# Patient Record
Sex: FEMALE | Race: WHITE | NOT HISPANIC OR LATINO | Employment: UNEMPLOYED | URBAN - NONMETROPOLITAN AREA
[De-identification: names, ages, dates, MRNs, and addresses within clinical notes are randomized per-mention and may not be internally consistent; named-entity substitution may affect disease eponyms.]

---

## 2017-01-11 ENCOUNTER — OFFICE VISIT (OUTPATIENT)
Dept: CHIROPRACTIC MEDICINE | Facility: OTHER | Age: 47
End: 2017-01-11
Attending: CHIROPRACTOR
Payer: COMMERCIAL

## 2017-01-11 DIAGNOSIS — M54.50 ACUTE BILATERAL LOW BACK PAIN WITHOUT SCIATICA: ICD-10-CM

## 2017-01-11 DIAGNOSIS — M99.03 SEGMENTAL AND SOMATIC DYSFUNCTION OF LUMBAR REGION: Primary | ICD-10-CM

## 2017-01-11 DIAGNOSIS — M99.02 SEGMENTAL AND SOMATIC DYSFUNCTION OF THORACIC REGION: ICD-10-CM

## 2017-01-11 PROCEDURE — 98940 CHIROPRACT MANJ 1-2 REGIONS: CPT | Mod: AT | Performed by: CHIROPRACTOR

## 2017-01-16 NOTE — PROGRESS NOTES
Subjective Finding:    Chief compalint: Patient presents with:  Back Pain  , Pain Scale: 7/10, Intensity: sharp, Duration: 2 days, Radiating: no.    Date of injury:     Activities that the pain restricts:   Home/household/hobbies/social activities: no.  Work duties: no.  Sleep: yes.  Makes symptoms better: rest.  Makes symptoms worse: thoracic extension and thoracic flexion.  Have you seen anyone else for the symptoms? no  Work related: no.  Automobile related injury: no.    Objective and Assessment:    Posture Analysis:   High shoulder: right.  Head tilt: left.  High iliac crest: left.  Head carriage: neutral.  Thoracic Kyphosis: neutral.  Lumbar Lordosis: neutral.    Lumbar Range of Motion: extension decreased.  Cervical Range of Motion: extension decreased.  Thoracic Range of Motion: extension decreased.  Extremity Range of Motion: .    Palpation:   Trap tightness    Segmental dysfunction pre-treatment and treatment area: T7-6  .  L5    Assessment post-treatment:  Cervical: .  Thoracic: ROM increased and pain and tenderness decreased.  Lumbar: ROM inc.    Comments: .      Complicating Factors: .    Plan / Procedure:    Treatment plan: PRN.  Instructed patient: stretch as instructed at visit.  Short term goals: reduce pain.  Long term goals: restore normal function.  Prognosis: excellent.

## 2017-02-24 ENCOUNTER — TRANSFERRED RECORDS (OUTPATIENT)
Dept: HEALTH INFORMATION MANAGEMENT | Facility: HOSPITAL | Age: 47
End: 2017-02-24

## 2017-02-24 ENCOUNTER — HOSPITAL ENCOUNTER (OUTPATIENT)
Dept: MRI IMAGING | Facility: HOSPITAL | Age: 47
Discharge: HOME | End: 2017-02-24
Attending: FAMILY MEDICINE | Admitting: FAMILY MEDICINE
Payer: COMMERCIAL

## 2017-02-24 DIAGNOSIS — G89.29 CHRONIC MIDLINE LOW BACK PAIN WITHOUT SCIATICA: Primary | ICD-10-CM

## 2017-02-24 DIAGNOSIS — M54.50 CHRONIC MIDLINE LOW BACK PAIN WITHOUT SCIATICA: Primary | ICD-10-CM

## 2017-02-24 PROCEDURE — 72148 MRI LUMBAR SPINE W/O DYE: CPT | Mod: TC

## 2017-03-27 ENCOUNTER — TRANSFERRED RECORDS (OUTPATIENT)
Dept: HEALTH INFORMATION MANAGEMENT | Facility: HOSPITAL | Age: 47
End: 2017-03-27

## 2017-04-20 ENCOUNTER — OFFICE VISIT (OUTPATIENT)
Dept: OBGYN | Facility: OTHER | Age: 47
End: 2017-04-20
Attending: OBSTETRICS & GYNECOLOGY
Payer: COMMERCIAL

## 2017-04-20 VITALS
HEART RATE: 94 BPM | BODY MASS INDEX: 22.82 KG/M2 | DIASTOLIC BLOOD PRESSURE: 76 MMHG | OXYGEN SATURATION: 98 % | HEIGHT: 62 IN | SYSTOLIC BLOOD PRESSURE: 118 MMHG | WEIGHT: 124 LBS

## 2017-04-20 DIAGNOSIS — R10.2 PELVIC PAIN IN FEMALE: ICD-10-CM

## 2017-04-20 DIAGNOSIS — N92.1 MENORRHAGIA WITH IRREGULAR CYCLE: ICD-10-CM

## 2017-04-20 DIAGNOSIS — N80.9 ENDOMETRIOSIS: ICD-10-CM

## 2017-04-20 DIAGNOSIS — N93.9 ABNORMAL UTERINE BLEEDING: Primary | ICD-10-CM

## 2017-04-20 LAB — TSH SERPL DL<=0.05 MIU/L-ACNC: 0.81 MU/L (ref 0.4–4)

## 2017-04-20 PROCEDURE — 36415 COLL VENOUS BLD VENIPUNCTURE: CPT | Performed by: OBSTETRICS & GYNECOLOGY

## 2017-04-20 PROCEDURE — 84443 ASSAY THYROID STIM HORMONE: CPT | Performed by: OBSTETRICS & GYNECOLOGY

## 2017-04-20 PROCEDURE — 84146 ASSAY OF PROLACTIN: CPT | Performed by: OBSTETRICS & GYNECOLOGY

## 2017-04-20 PROCEDURE — 58100 BIOPSY OF UTERUS LINING: CPT | Performed by: OBSTETRICS & GYNECOLOGY

## 2017-04-20 PROCEDURE — 83001 ASSAY OF GONADOTROPIN (FSH): CPT | Performed by: OBSTETRICS & GYNECOLOGY

## 2017-04-20 PROCEDURE — 99213 OFFICE O/P EST LOW 20 MIN: CPT | Performed by: COUNSELOR

## 2017-04-20 PROCEDURE — 99243 OFF/OP CNSLTJ NEW/EST LOW 30: CPT | Mod: 25 | Performed by: OBSTETRICS & GYNECOLOGY

## 2017-04-20 PROCEDURE — 99000 SPECIMEN HANDLING OFFICE-LAB: CPT | Performed by: OBSTETRICS & GYNECOLOGY

## 2017-04-20 PROCEDURE — 88305 TISSUE EXAM BY PATHOLOGIST: CPT | Mod: TC | Performed by: OBSTETRICS & GYNECOLOGY

## 2017-04-20 ASSESSMENT — PAIN SCALES - GENERAL: PAINLEVEL: NO PAIN (0)

## 2017-04-20 NOTE — PROGRESS NOTES
CC:  Consult from Dr. Villegas for pelvic pain/endometriosis/menorrhagia  HPI:  Eugenia Earl is a 47 year old female P2(vag). No LMP recorded..  Menses are Irregular lasting 7 days with 3 of heavy flow.  Her menstrual flow is limiting her clothing choices and interferes with lifestyle/activites.   She has longstanding cyclical pelvic pain and dysmenorrhea.  She has a history of endometriosis with prior laparoscopy with laser and subsequently two rounds of Depo-Lupron with temporary remission.    Periods have become irregular in the last year and heavy.  Pain starts 1 week prior to menses and resolves after heavy bleeding.  Some increased vaginal DC but no vaginitis sx.  Pain worse on left side.  GYN h/o HSV but no outbreaks x 8 years.      Clots: Yes  Intermenstrual bleeding: No  Post-coital bleeding: No  Previous work-up:Yes  Contraception: BTO  Abnormal Pap: No  Dysmenorrhea: Yes  Pelvic pain:Yes      Past GYN history:        Last PAP smear:  Normal 7/16 by report    Patients records are available and reviewed at today's visit.    Past Medical History:   Diagnosis Date     Dysmenorrhea 04/16/2001     Endometriosis of uterus 05/10/2001     Endometriosis, site unspecified 06/18/2001     Follow-up examination, following other surgery 10/01/2008     Follow-up examination, following unspecified surgery 04/19/2001     Nonallopathic lesion of cervical region, not elsewhere classified 12/19/2001       Past Surgical History:   Procedure Laterality Date     LAMINECTOMY LUMBAR POSTERIOR MICROSCOPIC ONE LEVEL N/A 9/30/2015    Procedure: LAMINECTOMY LUMBAR POSTERIOR MICROSCOPIC ONE LEVEL;  Surgeon: Danyel Kaba MD;  Location: [facility]     TONSILLECTOMY       TUBAL LIGATION         Family History   Problem Relation Age of Onset     DIABETES Mother      Coronary Artery Disease Father      Hyperlipidemia Father        Current Outpatient Prescriptions   Medication Sig Dispense Refill     HYDROcodone-acetaminophen (NORCO)  "5-325 MG per tablet Take 1-2 tablets by mouth every 4 hours as needed for moderate to severe pain 15 tablet 0     GABAPENTIN PO Take 300 mg by mouth 2 times daily       acetaminophen (TYLENOL) 500 MG tablet Take 500-1,000 mg by mouth every 6 hours as needed for mild pain         Allergies: Nka [no known allergies]    ROS:  C: NEGATIVE for fever, chills, change in weight  GI: NEGATIVE for nausea, abdominal pain, heartburn, or change in bowel habits  : NEGATIVE for frequency, dysuria, hematuria, vaginal discharge  P: NEGATIVE for changes in mood or affect    EXAM:  Blood pressure 118/76, pulse 94, height 5' 1.75\" (1.568 m), weight 124 lb (56.2 kg), SpO2 98 %, not currently breastfeeding.   BMI= Body mass index is 22.86 kg/(m^2).  General - pleasant female in no acute distress.  Abdomen - soft, nontender, nondistended, no hepatosplenomegaly.  Pelvic - EG: normal adult female, BUS: within normal limits, Vagina: well rugated, no discharge, Cervix: no lesions or CMT, Uterus: firm,normal sized and mildly tender, Adnexae: no masses .  + left adnexal TTP  Rectovaginal - deferred.  Musculoskeletal - no gross deformities or edema  Neurological - normal mental status.  PROCEDURE:  After informed consent was obtained from the patient, a speculum was placed in the vagina to visualize the cervix.  The cervix was then swabbed with a betadine.   Endometrial biopsy pipelle was passed through the cervical os and tissue obtained.  Uterus sounded to 8 cm.   There were no complications. The patient tolerated the procedure well with a minimal amount of cramping noted.  Specimen was sent to pathology.      ASSESSMENT/PLAN:  (N93.9) Abnormal uterine bleeding  (primary encounter diagnosis)  Comment:Menorrhagia, irregular menses.  Perimenopausal.    Plan: Surgical pathology exam (EMBX), Follicle stimulating         hormone, TSH, Prolactin       Transvaginal Non OB     (R10.2) Pelvic pain in female  Comment: Endometriosis, recurrent    Plan: " Discussed expectant,  Medical (repeat Lupron)  surgical options(laparoscopy/hysterectomy). Pt would like to consider definitive treatment in the form of hysterectomy.  I would recommend LAVH/BSO for definitive cure of endometriosis but did discuss ovarian preservation along with menopausal risks.  She could consider LAVH/BS with left oophorectomy if she desires this option. She is aware that endometriosis/pelvic pain could recur until menopause if ovary is retained.   If FSH determines she is close to menopause may elect expectant management.  I will see her back for f/u appt in 2 weeks to discuss results of testing and POC.      I spent 45 minutes on this patient's visit (exclusive of separately billed services/procedures) and over half of this time was spent in face-to-face counseling regarding her diagnosis, treatment options with emphasis on  risks and benefits of each, prognosis and importance of compliance.

## 2017-04-20 NOTE — NURSING NOTE
"Chief Complaint   Patient presents with     Consult     Pelvic Pain/Endometriosis, Dr. Villegas Referring       Initial /76  Pulse 94  Ht 5' 1.75\" (1.568 m)  Wt 124 lb (56.2 kg)  SpO2 98%  BMI 22.86 kg/m2 Estimated body mass index is 22.86 kg/(m^2) as calculated from the following:    Height as of this encounter: 5' 1.75\" (1.568 m).    Weight as of this encounter: 124 lb (56.2 kg).  Medication Reconciliation: complete     Kath Barclay      "

## 2017-04-20 NOTE — MR AVS SNAPSHOT
"              After Visit Summary   4/20/2017    Eugenia Earl    MRN: [id number]           Patient Information     Date Of Birth          1970        Visit Information        Provider Department      4/20/2017 3:00 PM Chapo Szymanski MD [facility]        Today's Diagnoses     Abnormal uterine bleeding    -  1    Menorrhagia with irregular cycle        Pelvic pain in female        Endometriosis          Care Instructions    F/u appt 2 wks        Follow-ups after your visit        Your next 10 appointments already scheduled     May 09, 2017  2:30 PM CDT   (Arrive by 2:15 PM)   SHORT with Chapo Szymanski MD   [facility] ([facility])    [address] [place] [address]   [phone]              Who to contact     If you have questions or need follow up information about today's clinic visit or your schedule please contact [facility] directly at [phone].  Normal or non-critical lab and imaging results will be communicated to you by MyChart, letter or phone within 4 business days after the clinic has received the results. If you do not hear from us within 7 days, please contact the clinic through MyChart or phone. If you have a critical or abnormal lab result, we will notify you by phone as soon as possible.  Submit refill requests through [employer] or call your pharmacy and they will forward the refill request to us. Please allow 3 business days for your refill to be completed.          Additional Information About Your Visit        MyChart Information     [employer] lets you send messages to your doctor, view your test results, renew your prescriptions, schedule appointments and more. To sign up, go to [email or web address] . Click on \"Log in\" on the left side of the screen, which will take you to the Welcome page. Then click on \"Sign up Now\" on the right side of the page.     You will be asked to enter the access code listed below, as well as some personal " "information. Please follow the directions to create your username and password.     Your access code is: [email or web address]  Expires: 2017  5:28 PM     Your access code will  in 90 days. If you need help or a new code, please call your [facility] clinic or [phone].        Care EveryWhere ID     This is your Care EveryWhere ID. This could be used by other organizations to access your [facility] medical records  [id number]        Your Vitals Were     Pulse Height Pulse Oximetry BMI (Body Mass Index)          94 5' 1.75\" (1.568 m) 98% 22.86 kg/m2         Blood Pressure from Last 3 Encounters:   17 118/76   16 134/82   09/30/15 115/78    Weight from Last 3 Encounters:   17 124 lb (56.2 kg)   09/30/15 102 lb (46.3 kg)   09/21/15 102 lb 6.4 oz (46.4 kg)              We Performed the Following     ENDOMETRIAL BIOPSY W/O CERVICAL DILATION     Follicle stimulating hormone     Prolactin     Surgical pathology exam     TSH     US Transvaginal Non OB        Primary Care Provider Office Phone # Fax #    Yan Villegas [phone]       [place] [id number] [place], [place] [id number]        Thank you!     Thank you for choosing [facility]  for your care. Our goal is always to provide you with excellent care. Hearing back from our patients is one way we can continue to improve our services. Please take a few minutes to complete the written survey that you may receive in the mail after your visit with us. Thank you!             Your Updated Medication List - Protect others around you: Learn how to safely use, store and throw away your medicines at www.disposemymeds.org.          This list is accurate as of: 17  5:28 PM.  Always use your most recent med list.                   Brand Name Dispense Instructions for use    GABAPENTIN PO      Take 300 mg by mouth 2 times daily       HYDROcodone-acetaminophen 5-325 MG per tablet    NORCO    15 tablet    Take 1-2 " tablets by mouth every 4 hours as needed for moderate to severe pain       TYLENOL 500 MG tablet   Generic drug:  acetaminophen      Take 500-1,000 mg by mouth every 6 hours as needed for mild pain

## 2017-04-21 LAB
FSH SERPL-ACNC: 41 IU/L
PROLACTIN SERPL-MCNC: 7 UG/L (ref 3–27)

## 2017-04-24 LAB — COPATH REPORT: NORMAL

## 2017-05-23 DIAGNOSIS — N92.1 EXCESSIVE AND FREQUENT MENSTRUATION WITH IRREGULAR CYCLE: Primary | ICD-10-CM

## 2017-06-01 ENCOUNTER — HOSPITAL ENCOUNTER (OUTPATIENT)
Dept: ULTRASOUND IMAGING | Facility: HOSPITAL | Age: 47
Discharge: HOME | End: 2017-06-01
Attending: OBSTETRICS & GYNECOLOGY | Admitting: OBSTETRICS & GYNECOLOGY
Payer: COMMERCIAL

## 2017-06-01 PROCEDURE — 76830 TRANSVAGINAL US NON-OB: CPT | Mod: TC

## 2017-06-01 PROCEDURE — 76856 US EXAM PELVIC COMPLETE: CPT | Mod: TC

## 2017-06-06 ENCOUNTER — OFFICE VISIT (OUTPATIENT)
Dept: OBGYN | Facility: OTHER | Age: 47
End: 2017-06-06
Attending: OBSTETRICS & GYNECOLOGY
Payer: COMMERCIAL

## 2017-06-06 VITALS
HEIGHT: 62 IN | SYSTOLIC BLOOD PRESSURE: 114 MMHG | OXYGEN SATURATION: 98 % | WEIGHT: 128 LBS | BODY MASS INDEX: 23.55 KG/M2 | HEART RATE: 86 BPM | DIASTOLIC BLOOD PRESSURE: 68 MMHG

## 2017-06-06 DIAGNOSIS — N80.9 ENDOMETRIOSIS: ICD-10-CM

## 2017-06-06 DIAGNOSIS — N92.1 MENORRHAGIA WITH IRREGULAR CYCLE: ICD-10-CM

## 2017-06-06 DIAGNOSIS — R10.2 PELVIC PAIN IN FEMALE: ICD-10-CM

## 2017-06-06 DIAGNOSIS — N94.6 DYSMENORRHEA: Primary | ICD-10-CM

## 2017-06-06 PROCEDURE — 99213 OFFICE O/P EST LOW 20 MIN: CPT | Performed by: OBSTETRICS & GYNECOLOGY

## 2017-06-06 PROCEDURE — 99212 OFFICE O/P EST SF 10 MIN: CPT

## 2017-06-06 ASSESSMENT — PAIN SCALES - GENERAL: PAINLEVEL: NO PAIN (0)

## 2017-06-06 NOTE — MR AVS SNAPSHOT
"              After Visit Summary   2017    Eugenia Earl    MRN: [id number]           Patient Information     Date Of Birth          1970        Visit Information        Provider Department      2017 3:30 PM Chapo Szymanski MD [facility]        Care Instructions    Pt has my card and phone number to call as needed if problems in the interim..           Follow-ups after your visit        Who to contact     If you have questions or need follow up information about today's clinic visit or your schedule please contact [facility] directly at [phone].  Normal or non-critical lab and imaging results will be communicated to you by [employer], letter or phone within 4 business days after the clinic has received the results. If you do not hear from us within 7 days, please contact the clinic through [employer] or phone. If you have a critical or abnormal lab result, we will notify you by phone as soon as possible.  Submit refill requests through [employer] or call your pharmacy and they will forward the refill request to us. Please allow 3 business days for your refill to be completed.          Additional Information About Your Visit        MyChart Information     [employer] lets you send messages to your doctor, view your test results, renew your prescriptions, schedule appointments and more. To sign up, go to [email or web address] . Click on \"Log in\" on the left side of the screen, which will take you to the Welcome page. Then click on \"Sign up Now\" on the right side of the page.     You will be asked to enter the access code listed below, as well as some personal information. Please follow the directions to create your username and password.     Your access code is: [email or web address]  Expires: 2017  5:28 PM     Your access code will  in 90 days. If you need help or a new code, please call your [facility] or [phone].        Care EveryWhere ID     This is your Care " "EveryWhere ID. This could be used by other organizations to access your [facility] medical records  [id number]        Your Vitals Were     Pulse Height Pulse Oximetry BMI (Body Mass Index)          86 5' 2\" (1.575 m) 98% 23.41 kg/m2         Blood Pressure from Last 3 Encounters:   06/06/17 114/68   04/20/17 118/76   11/02/16 134/82    Weight from Last 3 Encounters:   06/06/17 128 lb (58.1 kg)   04/20/17 124 lb (56.2 kg)   09/30/15 102 lb (46.3 kg)              Today, you had the following     No orders found for display       Primary Care Provider Office Phone # Fax #    Yan Villegas [phone]       [facility] [address] [place] [address]        Thank you!     Thank you for choosing [facility]  for your care. Our goal is always to provide you with excellent care. Hearing back from our patients is one way we can continue to improve our services. Please take a few minutes to complete the written survey that you may receive in the mail after your visit with us. Thank you!             Your Updated Medication List - Protect others around you: Learn how to safely use, store and throw away your medicines at www.disposemymeds.org.          This list is accurate as of: 6/6/17  3:40 PM.  Always use your most recent med list.                   Brand Name Dispense Instructions for use    HYDROcodone-acetaminophen 5-325 MG per tablet    NORCO    15 tablet    Take 1-2 tablets by mouth every 4 hours as needed for moderate to severe pain         "

## 2017-06-06 NOTE — PROGRESS NOTES
"S:  F/u menorrhagia/dysmenorrhea/endometriosis    See my prior eval.  Pt returns for f/u of testing and discussion of plan of care.  She had two periods both heavy and painful last month.  Otherwise no new complaints.     US and embx nml.   FSH was elevated/valerie-menopausal.  TSH and prolactin nml.   Previous pap and CBC  nml.               Past Medical History:   Diagnosis Date     Dysmenorrhea 04/16/2001     Endometriosis of uterus 05/10/2001     Endometriosis, site unspecified 06/18/2001     Follow-up examination, following other surgery 10/01/2008     Follow-up examination, following unspecified surgery 04/19/2001     Nonallopathic lesion of cervical region, not elsewhere classified 12/19/2001            Past Surgical History:   Procedure Laterality Date     LAMINECTOMY LUMBAR POSTERIOR MICROSCOPIC ONE LEVEL N/A 9/30/2015    Procedure: LAMINECTOMY LUMBAR POSTERIOR MICROSCOPIC ONE LEVEL;  Surgeon: Danyel Kaba MD;  Location: [facility]     TONSILLECTOMY       TUBAL LIGATION              Social History   Substance Use Topics     Smoking status: Former Smoker     Smokeless tobacco: Never Used     Alcohol use Yes      Comment: occasionally            Family History   Problem Relation Age of Onset     DIABETES Mother      Coronary Artery Disease Father      Hyperlipidemia Father                Allergies   Allergen Reactions     Nka [No Known Allergies]             Current Outpatient Prescriptions   Medication Sig Dispense Refill     HYDROcodone-acetaminophen (NORCO) 5-325 MG per tablet Take 1-2 tablets by mouth every 4 hours as needed for moderate to severe pain (Patient not taking: Reported on 6/6/2017) 15 tablet 0          Review Of Systems  Constitutional:  Denies fever  GI/ negative except as noted per hpi    O:   /68 (BP Location: Left arm, Cuff Size: Adult Regular)  Pulse 86  Ht 5' 2\" (1.575 m)  Wt 128 lb (58.1 kg)  SpO2 98%  BMI 23.41 kg/m2  Gen:  NAD, A and O           A:  Dysmenorrhea, " endometriosis, menorrhagia with hypermenorrhea, perimenopausal    P:  Discussed expectant, medical (Depo-Lupron) and surgical (LAVH/BS/LO).  R/B/SE's of each discussed.  Pt would like to think about her options and call back with how she wishes to proceed.   Pt has my card and phone number to call.15 minutes were spent with the patient with greater than 50% of the visit spent in face-to-face counseling and coordination of care.

## 2017-06-06 NOTE — NURSING NOTE
"Chief Complaint   Patient presents with     RECHECK     follow up pelvic pain/ endometriosis/ menorrhagia       Initial /68 (BP Location: Left arm, Cuff Size: Adult Regular)  Pulse 86  Ht 5' 2\" (1.575 m)  Wt 128 lb (58.1 kg)  SpO2 98%  BMI 23.41 kg/m2 Estimated body mass index is 23.41 kg/(m^2) as calculated from the following:    Height as of this encounter: 5' 2\" (1.575 m).    Weight as of this encounter: 128 lb (58.1 kg).  Medication Reconciliation: complete     Nova Good      "

## 2017-07-26 DIAGNOSIS — N94.6 DYSMENORRHEA: Primary | ICD-10-CM

## 2017-07-26 DIAGNOSIS — N95.1 PERIMENOPAUSAL: ICD-10-CM

## 2017-07-26 DIAGNOSIS — N92.0 MENORRHAGIA: ICD-10-CM

## 2017-07-26 DIAGNOSIS — N92.0 HYPERMENORRHEA: ICD-10-CM

## 2017-07-26 DIAGNOSIS — N80.9 ENDOMETRIOSIS: ICD-10-CM

## 2017-07-26 PROBLEM — M54.16 COMPRESSION OF LUMBAR NERVE ROOT: Status: ACTIVE | Noted: 2017-02-28

## 2017-08-28 ENCOUNTER — HOSPITAL ENCOUNTER (EMERGENCY)
Facility: HOSPITAL | Age: 47
Discharge: HOME | End: 2017-08-28
Attending: PHYSICIAN ASSISTANT | Admitting: PHYSICIAN ASSISTANT
Payer: MEDICAID

## 2017-08-28 VITALS
DIASTOLIC BLOOD PRESSURE: 85 MMHG | OXYGEN SATURATION: 95 % | RESPIRATION RATE: 12 BRPM | TEMPERATURE: 98.7 F | SYSTOLIC BLOOD PRESSURE: 148 MMHG | HEART RATE: 93 BPM

## 2017-08-28 DIAGNOSIS — R07.0 THROAT PAIN: ICD-10-CM

## 2017-08-28 LAB
DEPRECATED S PYO AG THROAT QL EIA: NORMAL
SPECIMEN SOURCE: NORMAL

## 2017-08-28 PROCEDURE — 87880 STREP A ASSAY W/OPTIC: CPT | Performed by: PHYSICIAN ASSISTANT

## 2017-08-28 PROCEDURE — 99213 OFFICE O/P EST LOW 20 MIN: CPT

## 2017-08-28 PROCEDURE — 99213 OFFICE O/P EST LOW 20 MIN: CPT | Performed by: PHYSICIAN ASSISTANT

## 2017-08-28 PROCEDURE — 87081 CULTURE SCREEN ONLY: CPT | Performed by: PHYSICIAN ASSISTANT

## 2017-08-28 ASSESSMENT — ENCOUNTER SYMPTOMS
NECK PAIN: 0
EYE DISCHARGE: 0
VOMITING: 0
APPETITE CHANGE: 0
DIZZINESS: 0
NECK STIFFNESS: 0
VOICE CHANGE: 0
EYE REDNESS: 0
HEADACHES: 0
DIARRHEA: 0
COUGH: 0
SORE THROAT: 1
SINUS PRESSURE: 0
TROUBLE SWALLOWING: 0
FEVER: 0
ABDOMINAL PAIN: 0
NAUSEA: 0
FATIGUE: 1
PSYCHIATRIC NEGATIVE: 1
CARDIOVASCULAR NEGATIVE: 1
LIGHT-HEADEDNESS: 0

## 2017-08-28 NOTE — ED AVS SNAPSHOT
[facility]    [address]    [place] [address]    Phone:  [phone]                                       Eugenia Earl   MRN: [id number]    Department:  [facility]   Date of Visit:  8/28/2017           After Visit Summary Signature Page     I have received my discharge instructions, and my questions have been answered. I have discussed any challenges I see with this plan with the nurse or doctor.    ..........................................................................................................................................  Patient/Patient Representative Signature      ..........................................................................................................................................  Patient Representative Print Name and Relationship to Patient    ..................................................               ................................................  Date                                            Time    ..........................................................................................................................................  Reviewed by Signature/Title    ...................................................              ..............................................  Date                                                            Time

## 2017-08-28 NOTE — ED AVS SNAPSHOT
[facility]    [address] [place] [address]    Phone:  [phone]                                       Eugenia Earl   MRN: [id number]    Department:  [facility]   Date of Visit:  8/28/2017           Patient Information     Date Of Birth          1970        Your diagnoses for this visit were:     Throat pain        You were seen by Angie Montes PA.      Follow-up Information     Follow up with Yan Villegas MD.    Specialty:  Family Practice    Why:  If symptoms worsen    Contact information:    [facility]  [address] [place], [place] [address] [address] [phone]          Follow up with [facility].    Specialty:  EMERGENCY MEDICINE    Why:  if further concerns develop    Contact information:    [address] [place] [address] [phone]    Additional information:    From [facility]: Take [address]. Turn left at [address]. Turn left at the first stoplight on [address]. At the first stop sign, take a right onto [address]. Take a left into the parking lot and continue through until you reach the North enterance of the building.       From [place]: Take [address]. Take the [address] ramp towards [place]. Turn left onto [address]. Take a slight right onto [address]. Turn left at the first stoplight on [address]. At the first stop sign, take a right onto [address]. Take a left into the parking lot and continue through until you reach the North enterance of the building.       From [place]: Take [address]. Take a right at [address]. At the first stop sign, take a right onto [address]. Take a left into the parking lot and continue through until you reach the North enterance of the building.       Discharge References/Attachments     SORE THROATS, SELF-CARE FOR (ENGLISH)    BREAST ANATOMY (ENGLISH)    BREAST SELF-AWARENESS, BREAST  "HEALTH: (ENGLISH)         Review of your medicines      Our records show that you are taking the medicines listed below. If these are incorrect, please call your family doctor or clinic.        Dose / Directions Last dose taken    HYDROcodone-acetaminophen 5-325 MG per tablet   Commonly known as:  NORCO   Dose:  1-2 tablet   Quantity:  15 tablet        Take 1-2 tablets by mouth every 4 hours as needed for moderate to severe pain   Refills:  0                Procedures and tests performed during your visit     Beta strep group A culture    Rapid strep screen      Orders Needing Specimen Collection     None      Pending Results     Date and Time Order Name Status Description    2017 1501 Beta strep group A culture In process             Pending Culture Results     Date and Time Order Name Status Description    2017 1501 Beta strep group A culture In process             Thank you for choosing [facility]       Thank you for choosing [facility] for your care. Our goal is always to provide you with excellent care. Hearing back from our patients is one way we can continue to improve our services. Please take a few minutes to complete the written survey that you may receive in the mail after you visit with us. Thank you!        [employer] Information     [employer] lets you send messages to your doctor, view your test results, renew your prescriptions, schedule appointments and more. To sign up, go to [email or web address] . Click on \"Log in\" on the left side of the screen, which will take you to the Welcome page. Then click on \"Sign up Now\" on the right side of the page.     You will be asked to enter the access code listed below, as well as some personal information. Please follow the directions to create your username and password.     Your access code is: [email or web address]  Expires: 2017  3:31 PM     Your access code will  in 90 days. If you need help or a new code, please call your [facility] clinic or " [phone].        Care EveryWhere ID     This is your Care EveryWhere ID. This could be used by other organizations to access your [facility] medical records  [id number]        Equal Access to Services     MARQUEZ TRACEY : Maria Elena Bonilla, maty pichardo, armindamontana kachicamayte villasenor, aditi nieves. So [place] [phone].    ATENCIÓN: Si habla español, tiene a mendoza disposición servicios gratuitos de asistencia lingüística. Llame al [phone].    We comply with applicable federal civil rights laws and [place] laws. We do not discriminate on the basis of race, color, national origin, age, disability sex, sexual orientation or gender identity.            After Visit Summary       This is your record. Keep this with you and show to your community pharmacist(s) and doctor(s) at your next visit.

## 2017-08-28 NOTE — ED PROVIDER NOTES
"  History     Chief Complaint   Patient presents with     Pharyngitis     X 1 week, worse today     The history is provided by the patient. No  was used.     Eugenia Earl is a 47 year old female who has one week of sore throat, fatigue. No n/v/d/f/c. Mild right ear pain,  No sinus pain/pressure.  No rash.    I have reviewed the Medications, Allergies, Past Medical and Surgical History, and Social History in the Epic system.    Allergies:   Allergies   Allergen Reactions     Nka [No Known Allergies]          No current facility-administered medications on file prior to encounter.   Current Outpatient Prescriptions on File Prior to Encounter:  HYDROcodone-acetaminophen (NORCO) 5-325 MG per tablet Take 1-2 tablets by mouth every 4 hours as needed for moderate to severe pain (Patient not taking: Reported on 6/6/2017)       Patient Active Problem List   Diagnosis     Dysmenorrhea     Endometriosis of uterus     Compression of lumbar nerve root       Past Surgical History:   Procedure Laterality Date     LAMINECTOMY LUMBAR POSTERIOR MICROSCOPIC ONE LEVEL N/A 9/30/2015    Procedure: LAMINECTOMY LUMBAR POSTERIOR MICROSCOPIC ONE LEVEL;  Surgeon: Danyel Kaba MD;  Location: [facility]     TONSILLECTOMY       TUBAL LIGATION         Social History   Substance Use Topics     Smoking status: Former Smoker     Smokeless tobacco: Never Used     Alcohol use Yes      Comment: occasionally       Most Recent Immunizations   Administered Date(s) Administered     DPT 10/12/1977     DT (PEDS <7y) 01/01/1987     Influenza (H1N1) 12/14/2009     Influenza (IIV3) 11/09/2011     MMR 07/30/1992     OPV 06/12/1987     TD (ADULT, 7+) 06/01/1997     TDAP Vaccine (Adacel) 09/22/2008       BMI: Estimated body mass index is 23.41 kg/(m^2) as calculated from the following:    Height as of 6/6/17: 1.575 m (5' 2\").    Weight as of 6/6/17: 58.1 kg (128 lb).      Review of Systems   Constitutional: Positive for fatigue. Negative " for appetite change and fever.   HENT: Positive for ear pain and sore throat. Negative for congestion, sinus pressure, trouble swallowing and voice change.    Eyes: Negative for discharge and redness.   Respiratory: Negative for cough.    Cardiovascular: Negative.    Gastrointestinal: Negative for abdominal pain, diarrhea, nausea and vomiting.   Genitourinary: Negative.    Musculoskeletal: Negative for neck pain and neck stiffness.   Skin: Negative for rash.   Neurological: Negative for dizziness, light-headedness and headaches.   Psychiatric/Behavioral: Negative.        Physical Exam   BP: 148/85  Pulse: 93  Temp: 98.7  F (37.1  C)  Resp: 12  SpO2: 95 %  Physical Exam   Constitutional: She is oriented to person, place, and time. She appears well-developed and well-nourished. No distress.   HENT:   Head: Normocephalic and atraumatic.   Right Ear: External ear normal.   Left Ear: External ear normal.   Mouth/Throat: Oropharynx is clear and moist.   Bilateral TMs/canals clear/wnl  No sinus TTP     Eyes: Conjunctivae and EOM are normal. Right eye exhibits no discharge. Left eye exhibits no discharge.   Neck: Normal range of motion. Neck supple.   Cardiovascular: Normal rate, regular rhythm and normal heart sounds.    Pulmonary/Chest: Effort normal and breath sounds normal. No respiratory distress.   Abdominal: Soft. Bowel sounds are normal. She exhibits no distension. There is no tenderness.   Neurological: She is alert and oriented to person, place, and time.   Skin: Skin is warm and dry. No rash noted. She is not diaphoretic.   Psychiatric: She has a normal mood and affect.   Nursing note and vitals reviewed.      ED Course     ED Course     Procedures        Labs Ordered and Resulted from Time of ED Arrival Up to the Time of Departure from the ED   RAPID STREP SCREEN   BETA STREP GROUP A CULTURE       Assessments & Plan (with Medical Decision Making)     I have reviewed the nursing notes.    I have reviewed the  findings, diagnosis, plan and need for follow up with the patient.    Final diagnoses:   Throat pain - Rapid Strep negative  Culture negative           Patient verbally educated and given appropriate education sheets for each of the diagnoses and has no questions.  Take OTC motrin or tylenol as directed on the bottle as needed.  Take prescription medications as directed.  Increase fluids, wash hands often.  Sleep in a recliner or with multiple pillows until this has resolved.  Follow up with your provider if symptoms increase or if concerns develop, return to the ER.  Angie Montes Certified   Physician Assistant  8/28/2017  5:26 PM  URGENT CARE CLINIC    8/28/2017   [facility]     Angie Montes PA  08/28/17 [id number]

## 2017-08-30 LAB
BACTERIA SPEC CULT: NORMAL
SPECIMEN SOURCE: NORMAL

## 2017-09-25 ENCOUNTER — TRANSFERRED RECORDS (OUTPATIENT)
Dept: HEALTH INFORMATION MANAGEMENT | Facility: HOSPITAL | Age: 47
End: 2017-09-25

## 2017-09-26 ENCOUNTER — OFFICE VISIT (OUTPATIENT)
Dept: FAMILY MEDICINE | Facility: OTHER | Age: 47
End: 2017-09-26
Attending: PHYSICIAN ASSISTANT
Payer: COMMERCIAL

## 2017-09-26 VITALS
SYSTOLIC BLOOD PRESSURE: 116 MMHG | RESPIRATION RATE: 16 BRPM | WEIGHT: 121 LBS | HEART RATE: 73 BPM | TEMPERATURE: 98 F | BODY MASS INDEX: 22.26 KG/M2 | HEIGHT: 62 IN | OXYGEN SATURATION: 98 % | DIASTOLIC BLOOD PRESSURE: 72 MMHG

## 2017-09-26 DIAGNOSIS — Z01.818 PREOP GENERAL PHYSICAL EXAM: Primary | ICD-10-CM

## 2017-09-26 PROCEDURE — 99214 OFFICE O/P EST MOD 30 MIN: CPT

## 2017-09-26 PROCEDURE — 99203 OFFICE O/P NEW LOW 30 MIN: CPT

## 2017-09-26 PROCEDURE — 99214 OFFICE O/P EST MOD 30 MIN: CPT | Performed by: PHYSICIAN ASSISTANT

## 2017-09-26 ASSESSMENT — PAIN SCALES - GENERAL: PAINLEVEL: NO PAIN (0)

## 2017-09-26 ASSESSMENT — ANXIETY QUESTIONNAIRES
1. FEELING NERVOUS, ANXIOUS, OR ON EDGE: NOT AT ALL
2. NOT BEING ABLE TO STOP OR CONTROL WORRYING: NOT AT ALL
5. BEING SO RESTLESS THAT IT IS HARD TO SIT STILL: NOT AT ALL
6. BECOMING EASILY ANNOYED OR IRRITABLE: NOT AT ALL
3. WORRYING TOO MUCH ABOUT DIFFERENT THINGS: NOT AT ALL
GAD7 TOTAL SCORE: 0
7. FEELING AFRAID AS IF SOMETHING AWFUL MIGHT HAPPEN: NOT AT ALL

## 2017-09-26 ASSESSMENT — PATIENT HEALTH QUESTIONNAIRE - PHQ9
SUM OF ALL RESPONSES TO PHQ QUESTIONS 1-9: 5
5. POOR APPETITE OR OVEREATING: NOT AT ALL

## 2017-09-26 NOTE — MR AVS SNAPSHOT
After Visit Summary   9/26/2017    Eugenia Earl    MRN: [id number]           Patient Information     Date Of Birth          1970        Visit Information        Provider Department      9/26/2017 1:30 PM Shama Gil PA [facility]        Today's Diagnoses     Preop general physical exam    -  1      Care Instructions      Before Your Surgery      Call your surgeon if there is any change in your health. This includes signs of a cold or flu (such as a sore throat, runny nose, cough, rash or fever).    Do not smoke, drink alcohol or take over the counter medicine (unless your surgeon or primary care doctor tells you to) for the 24 hours before and after surgery.    If you take prescribed drugs: Follow your doctor s orders about which medicines to take and which to stop until after surgery.    Eating and drinking prior to surgery: follow the instructions from your surgeon    Take a shower or bath the night before surgery. Use the soap your surgeon gave you to gently clean your skin. If you do not have soap from your surgeon, use your regular soap. Do not shave or scrub the surgery site.  Wear clean pajamas and have clean sheets on your bed.           Follow-ups after your visit        Your next 10 appointments already scheduled     Sep 27, 2017   Procedure with Chapo Szymanski MD   [facility] ([address] [address] [phone]              Who to contact     If you have questions or need follow up information about today's clinic visit or your schedule please contact [facility] directly at [phone].  Normal or non-critical lab and imaging results will be communicated to you by MyChart, letter or phone within 4 business days after the clinic has received the results. If you do not hear from us within 7 days, please contact the clinic through MyChart or phone. If you have a critical or abnormal lab  "result, we will notify you by phone as soon as possible.  Submit refill requests through beqom or call your pharmacy and they will forward the refill request to us. Please allow 3 business days for your refill to be completed.          Additional Information About Your Visit        Streamuphart Information     beqom gives you secure access to your electronic health record. If you see a primary care provider, you can also send messages to your care team and make appointments. If you have questions, please call your primary care clinic.  If you do not have a primary care provider, please call [phone] and they will assist you.        Care EveryWhere ID     This is your Care EveryWhere ID. This could be used by other organizations to access your [place] medical records  [id number]        Your Vitals Were     Pulse Temperature Respirations Height Last Period Pulse Oximetry    73 98  F (36.7  C) (Tympanic) 16 5' 2\" (1.575 m) 08/10/2017 (Approximate) 98%    BMI (Body Mass Index)                   22.13 kg/m2            Blood Pressure from Last 3 Encounters:   09/26/17 116/72   08/28/17 148/85   06/06/17 114/68    Weight from Last 3 Encounters:   09/26/17 121 lb (54.9 kg)   06/06/17 128 lb (58.1 kg)   04/20/17 124 lb (56.2 kg)              Today, you had the following     No orders found for display       Primary Care Provider Office Phone # Fax #    Yan MARRY Villegas [phone]       [facility] [place] [address] [place] [address]        Equal Access to Services     [facility] HospitalDAV[facility] : Hadii aad ku hadasho Soomaali, waaxda luqadaha, qaybta kaalmada adeegyada, aditi parra . So [facility] [phone].    ATENCIÓN: Si habla español, tiene a mendoza disposición servicios gratuitos de asistencia lingüística. Llame al [phone].    We comply with applicable federal civil rights laws and [place] laws. We do not discriminate on the basis of race, color, national origin, age, " disability sex, sexual orientation or gender identity.            Thank you!     Thank you for choosing [facility]  for your care. Our goal is always to provide you with excellent care. Hearing back from our patients is one way we can continue to improve our services. Please take a few minutes to complete the written survey that you may receive in the mail after your visit with us. Thank you!             Your Updated Medication List - Protect others around you: Learn how to safely use, store and throw away your medicines at www.disposemymeds.org.      Notice  As of 9/26/2017  2:19 PM    You have not been prescribed any medications.

## 2017-09-26 NOTE — OR NURSING
Note from [facility] states Eugenia left without being seen for her H & P.  Ania notified.  Gemini Marquis

## 2017-09-26 NOTE — PROGRESS NOTES
[address]  [facility] [address]  [phone]  Dept: [phone]    PRE-OP EVALUATION:  Today's date: 2017    Eugenia Earl (: 1970) presents for pre-operative evaluation assessment as requested by Dr. Szymanski.  She requires evaluation and anesthesia risk assessment prior to undergoing surgery/procedure for treatment of irregular periods .  Proposed procedure: Hysterectomy    Date of Surgery/ Procedure: 2017  Time of Surgery/ Procedure: [facility]  Hospital/Surgical Facility: [facility]  Primary Physician: Yan Villegas  Type of Anesthesia Anticipated: to be determined    Patient has a Health Care Directive or Living Will:  NO    1. NO - Do you have a history of heart attack, stroke, stent, bypass or surgery on an artery in the head, neck, heart or legs?  2. NO - Do you ever have any pain or discomfort in your chest?  3. NO - Do you have a history of  Heart Failure?  4. NO - Are you troubled by shortness of breath when: walking on the level, up a slight hill or at night?  5. NO - Do you currently have a cold, bronchitis or other respiratory infection?  6. NO - Do you have a cough, shortness of breath or wheezing?  7. NO - Do you sometimes get pains in the calves of your legs when you walk?  8. NO - Do you or anyone in your family have previous history of blood clots?  9. NO - Do you or does anyone in your family have a serious bleeding problem such as prolonged bleeding following surgeries or cuts?  10. NO - Have you ever had problems with anemia or been told to take iron pills?  11. NO - Have you had any abnormal blood loss such as black, tarry or bloody stools, or abnormal vaginal bleeding?  12. NO - Have you ever had a blood transfusion?  13. NO - Have you or any of your relatives ever had problems with anesthesia?  14. NO - Do you have sleep apnea, excessive snoring or daytime drowsiness?  15. NO - Do you have any prosthetic heart valves?  16. NO - Do you have prosthetic  joints?  17. NO - Is there any chance that you may be pregnant?        HPI:                                                      Brief HPI related to upcoming procedure: Patient has history of pelvic pain, menorrhagia and endometriosis.      See problem list for active medical problems.  Problems all longstanding and stable, except as noted/documented.  See ROS for pertinent symptoms related to these conditions.                                                                                                  .    MEDICAL HISTORY:                                                    Patient Active Problem List    Diagnosis Date Noted     Compression of lumbar nerve root 02/28/2017     Priority: Medium     Endometriosis of uterus 05/10/2001     Priority: Medium     Dysmenorrhea 04/16/2001     Priority: Medium      Past Medical History:   Diagnosis Date     Dysmenorrhea 04/16/2001     Endometriosis of uterus 05/10/2001     Endometriosis, site unspecified 06/18/2001     Follow-up examination, following other surgery 10/01/2008     Follow-up examination, following unspecified surgery 04/19/2001     Nonallopathic lesion of cervical region, not elsewhere classified 12/19/2001     Past Surgical History:   Procedure Laterality Date     LAMINECTOMY LUMBAR POSTERIOR MICROSCOPIC ONE LEVEL N/A 9/30/2015    Procedure: LAMINECTOMY LUMBAR POSTERIOR MICROSCOPIC ONE LEVEL;  Surgeon: Danyel Kaba MD;  Location: [facility]     TONSILLECTOMY       TUBAL LIGATION       No current outpatient prescriptions on file.     OTC products: None, except as noted above    Allergies   Allergen Reactions     Nka [No Known Allergies]       Latex Allergy: NO    Social History   Substance Use Topics     Smoking status: Former Smoker     Smokeless tobacco: Never Used     Alcohol use Yes      Comment: occasionally     History   Drug Use No       REVIEW OF SYSTEMS:                                                    C: NEGATIVE for fever, chills, change in  "weight  I: NEGATIVE for worrisome rashes, moles or lesions  E: NEGATIVE for vision changes or irritation  E/M: NEGATIVE for ear, mouth and throat problems  R: NEGATIVE for significant cough or SOB  CV: NEGATIVE for chest pain, palpitations or peripheral edema  GI: NEGATIVE for nausea, abdominal pain, heartburn, or change in bowel habits  : NEGATIVE for frequency, dysuria, or hematuria  M: NEGATIVE for significant arthralgias or myalgia  N: NEGATIVE for weakness, dizziness or paresthesias  E: NEGATIVE for temperature intolerance, skin/hair changes  H: NEGATIVE for bleeding problems  P: NEGATIVE for changes in mood or affect    EXAM:                                                    /72  Pulse 73  Temp 98  F (36.7  C) (Tympanic)  Resp 16  Ht 5' 2\" (1.575 m)  Wt 121 lb (54.9 kg)  LMP 08/10/2017 (Approximate)  SpO2 98%  BMI 22.13 kg/m2    GENERAL APPEARANCE: healthy, alert and no distress     EYES: EOMI, PERRL     HENT: ear canals and TM's normal and nose and mouth without ulcers or lesions     NECK: no adenopathy, no asymmetry, masses, or scars and thyroid normal to palpation     RESP: lungs clear to auscultation - no rales, rhonchi or wheezes     CV: regular rates and rhythm, normal S1 S2, no S3 or S4 and no murmur, click or rub     ABDOMEN:  soft, nontender, no HSM or masses and bowel sounds normal     SKIN: no suspicious lesions or rashes     NEURO: Normal strength and tone, sensory exam grossly normal, mentation intact and speech normal     PSYCH: mentation appears normal. and affect normal/bright     LYMPHATICS: No axillary, cervical, or supraclavicular nodes    DIAGNOSTICS:                                                    See attached    Recent Labs   Lab Test  11/02/16   1655  09/21/15   1834   HGB  14.8  13.1   PLT  326  302   NA  140  139   POTASSIUM  3.8  3.7   CR  0.77  0.96        IMPRESSION:                                                      (Z01.818) Preop general physical exam  " (primary encounter diagnosis)        RECOMMENDATIONS:                                                         Patient had a normal exam today. He is cleared for surgery 9-27-17, [facility] [place] [place], Dr. Szymanski.      Signed Electronically by: GUNJAN See    Copy of this evaluation report is provided to requesting physician.    [place] Preop Guidelines

## 2017-09-26 NOTE — H&P (VIEW-ONLY)
[address]  [facility] [address]  [phone]  Dept: [phone]    PRE-OP EVALUATION:  Today's date: 2017    Eugenia Earl (: 1970) presents for pre-operative evaluation assessment as requested by Dr. Szymanski.  She requires evaluation and anesthesia risk assessment prior to undergoing surgery/procedure for treatment of irregular periods .  Proposed procedure: Hysterectomy    Date of Surgery/ Procedure: 2017  Time of Surgery/ Procedure: [facility]  Hospital/Surgical Facility: [facility]  Primary Physician: Yan Villegas  Type of Anesthesia Anticipated: to be determined    Patient has a Health Care Directive or Living Will:  NO    1. NO - Do you have a history of heart attack, stroke, stent, bypass or surgery on an artery in the head, neck, heart or legs?  2. NO - Do you ever have any pain or discomfort in your chest?  3. NO - Do you have a history of  Heart Failure?  4. NO - Are you troubled by shortness of breath when: walking on the level, up a slight hill or at night?  5. NO - Do you currently have a cold, bronchitis or other respiratory infection?  6. NO - Do you have a cough, shortness of breath or wheezing?  7. NO - Do you sometimes get pains in the calves of your legs when you walk?  8. NO - Do you or anyone in your family have previous history of blood clots?  9. NO - Do you or does anyone in your family have a serious bleeding problem such as prolonged bleeding following surgeries or cuts?  10. NO - Have you ever had problems with anemia or been told to take iron pills?  11. NO - Have you had any abnormal blood loss such as black, tarry or bloody stools, or abnormal vaginal bleeding?  12. NO - Have you ever had a blood transfusion?  13. NO - Have you or any of your relatives ever had problems with anesthesia?  14. NO - Do you have sleep apnea, excessive snoring or daytime drowsiness?  15. NO - Do you have any prosthetic heart valves?  16. NO - Do you have prosthetic  joints?  17. NO - Is there any chance that you may be pregnant?        HPI:                                                      Brief HPI related to upcoming procedure: Patient has history of pelvic pain, menorrhagia and endometriosis.      See problem list for active medical problems.  Problems all longstanding and stable, except as noted/documented.  See ROS for pertinent symptoms related to these conditions.                                                                                                  .    MEDICAL HISTORY:                                                    Patient Active Problem List    Diagnosis Date Noted     Compression of lumbar nerve root 02/28/2017     Priority: Medium     Endometriosis of uterus 05/10/2001     Priority: Medium     Dysmenorrhea 04/16/2001     Priority: Medium      Past Medical History:   Diagnosis Date     Dysmenorrhea 04/16/2001     Endometriosis of uterus 05/10/2001     Endometriosis, site unspecified 06/18/2001     Follow-up examination, following other surgery 10/01/2008     Follow-up examination, following unspecified surgery 04/19/2001     Nonallopathic lesion of cervical region, not elsewhere classified 12/19/2001     Past Surgical History:   Procedure Laterality Date     LAMINECTOMY LUMBAR POSTERIOR MICROSCOPIC ONE LEVEL N/A 9/30/2015    Procedure: LAMINECTOMY LUMBAR POSTERIOR MICROSCOPIC ONE LEVEL;  Surgeon: Danyel Kaba MD;  Location: [facility]     TONSILLECTOMY       TUBAL LIGATION       No current outpatient prescriptions on file.     OTC products: None, except as noted above    Allergies   Allergen Reactions     Nka [No Known Allergies]       Latex Allergy: NO    Social History   Substance Use Topics     Smoking status: Former Smoker     Smokeless tobacco: Never Used     Alcohol use Yes      Comment: occasionally     History   Drug Use No       REVIEW OF SYSTEMS:                                                    C: NEGATIVE for fever, chills, change in  "weight  I: NEGATIVE for worrisome rashes, moles or lesions  E: NEGATIVE for vision changes or irritation  E/M: NEGATIVE for ear, mouth and throat problems  R: NEGATIVE for significant cough or SOB  CV: NEGATIVE for chest pain, palpitations or peripheral edema  GI: NEGATIVE for nausea, abdominal pain, heartburn, or change in bowel habits  : NEGATIVE for frequency, dysuria, or hematuria  M: NEGATIVE for significant arthralgias or myalgia  N: NEGATIVE for weakness, dizziness or paresthesias  E: NEGATIVE for temperature intolerance, skin/hair changes  H: NEGATIVE for bleeding problems  P: NEGATIVE for changes in mood or affect    EXAM:                                                    /72  Pulse 73  Temp 98  F (36.7  C) (Tympanic)  Resp 16  Ht 5' 2\" (1.575 m)  Wt 121 lb (54.9 kg)  LMP 08/10/2017 (Approximate)  SpO2 98%  BMI 22.13 kg/m2    GENERAL APPEARANCE: healthy, alert and no distress     EYES: EOMI, PERRL     HENT: ear canals and TM's normal and nose and mouth without ulcers or lesions     NECK: no adenopathy, no asymmetry, masses, or scars and thyroid normal to palpation     RESP: lungs clear to auscultation - no rales, rhonchi or wheezes     CV: regular rates and rhythm, normal S1 S2, no S3 or S4 and no murmur, click or rub     ABDOMEN:  soft, nontender, no HSM or masses and bowel sounds normal     SKIN: no suspicious lesions or rashes     NEURO: Normal strength and tone, sensory exam grossly normal, mentation intact and speech normal     PSYCH: mentation appears normal. and affect normal/bright     LYMPHATICS: No axillary, cervical, or supraclavicular nodes    DIAGNOSTICS:                                                    See attached    Recent Labs   Lab Test  11/02/16   1655  09/21/15   1834   HGB  14.8  13.1   PLT  326  302   NA  140  139   POTASSIUM  3.8  3.7   CR  0.77  0.96        IMPRESSION:                                                      (Z01.818) Preop general physical exam  " (primary encounter diagnosis)        RECOMMENDATIONS:                                                         Patient had a normal exam today. He is cleared [place] [facility] [place]zym[place]ki.      Signed Electronically by: GUNJAN See    Copy of this evaluation report is provided to requesting physician.    [place] Preop Guidelines

## 2017-09-26 NOTE — NURSING NOTE
"Chief Complaint   Patient presents with     Pre-Op Exam     Hysterectomy with Dr. Szymanski on 09/27/2017 at [facility] [place].        Initial /86 (BP Location: Right arm, Patient Position: Chair, Cuff Size: Adult Regular)  Pulse 73  Temp 98  F (36.7  C) (Tympanic)  Resp 16  Ht 5' 2\" (1.575 m)  Wt 121 lb (54.9 kg)  LMP 08/10/2017 (Approximate)  SpO2 98%  BMI 22.13 kg/m2 Estimated body mass index is 22.13 kg/(m^2) as calculated from the following:    Height as of this encounter: 5' 2\" (1.575 m).    Weight as of this encounter: 121 lb (54.9 kg).  Medication Reconciliation: complete   Bridgette Duong LPN    "

## 2017-09-27 ENCOUNTER — SURGERY (OUTPATIENT)
Age: 47
End: 2017-09-27

## 2017-09-27 ENCOUNTER — ANESTHESIA EVENT (OUTPATIENT)
Dept: SURGERY | Facility: HOSPITAL | Age: 47
End: 2017-09-27
Payer: COMMERCIAL

## 2017-09-27 ENCOUNTER — ANESTHESIA (OUTPATIENT)
Dept: SURGERY | Facility: HOSPITAL | Age: 47
End: 2017-09-27
Payer: COMMERCIAL

## 2017-09-27 ENCOUNTER — HOSPITAL ENCOUNTER (OUTPATIENT)
Facility: HOSPITAL | Age: 47
Discharge: HOME | End: 2017-09-28
Attending: OBSTETRICS & GYNECOLOGY | Admitting: OBSTETRICS & GYNECOLOGY
Payer: COMMERCIAL

## 2017-09-27 DIAGNOSIS — G89.18 ACUTE POST-OPERATIVE PAIN: ICD-10-CM

## 2017-09-27 DIAGNOSIS — Z90.710 STATUS POST LAPAROSCOPIC ASSISTED VAGINAL HYSTERECTOMY: Primary | ICD-10-CM

## 2017-09-27 PROBLEM — Z41.9 SURGERY, ELECTIVE: Status: ACTIVE | Noted: 2017-09-27

## 2017-09-27 LAB
ABO + RH BLD: NORMAL
ABO + RH BLD: NORMAL
BLD GP AB SCN SERPL QL: NORMAL
BLOOD BANK CMNT PATIENT-IMP: NORMAL
BLOOD BANK CMNT PATIENT-IMP: NORMAL
HCG UR QL: NEGATIVE
SPECIMEN EXP DATE BLD: NORMAL

## 2017-09-27 PROCEDURE — 37000009 ZZH ANESTHESIA TECHNICAL FEE, EACH ADDTL 15 MIN: Performed by: OBSTETRICS & GYNECOLOGY

## 2017-09-27 PROCEDURE — 36415 COLL VENOUS BLD VENIPUNCTURE: CPT | Performed by: OBSTETRICS & GYNECOLOGY

## 2017-09-27 PROCEDURE — 86850 RBC ANTIBODY SCREEN: CPT | Performed by: OBSTETRICS & GYNECOLOGY

## 2017-09-27 PROCEDURE — 25000128 H RX IP 250 OP 636: Performed by: ANESTHESIOLOGY

## 2017-09-27 PROCEDURE — 86901 BLOOD TYPING SEROLOGIC RH(D): CPT | Performed by: OBSTETRICS & GYNECOLOGY

## 2017-09-27 PROCEDURE — 40000275 ZZH STATISTIC RCP TIME EA 10 MIN

## 2017-09-27 PROCEDURE — 88307 TISSUE EXAM BY PATHOLOGIST: CPT | Mod: TC | Performed by: OBSTETRICS & GYNECOLOGY

## 2017-09-27 PROCEDURE — 01999 UNLISTED ANES PROCEDURE: CPT | Performed by: NURSE ANESTHETIST, CERTIFIED REGISTERED

## 2017-09-27 PROCEDURE — 25000125 ZZHC RX 250: Performed by: ANESTHESIOLOGY

## 2017-09-27 PROCEDURE — 25000125 ZZHC RX 250: Performed by: OBSTETRICS & GYNECOLOGY

## 2017-09-27 PROCEDURE — 36000093 ZZH SURGERY LEVEL 4 1ST 30 MIN: Performed by: OBSTETRICS & GYNECOLOGY

## 2017-09-27 PROCEDURE — 25000125 ZZHC RX 250: Performed by: NURSE ANESTHETIST, CERTIFIED REGISTERED

## 2017-09-27 PROCEDURE — 25000132 ZZH RX MED GY IP 250 OP 250 PS 637: Performed by: OBSTETRICS & GYNECOLOGY

## 2017-09-27 PROCEDURE — 37000008 ZZH ANESTHESIA TECHNICAL FEE, 1ST 30 MIN: Performed by: OBSTETRICS & GYNECOLOGY

## 2017-09-27 PROCEDURE — 81025 URINE PREGNANCY TEST: CPT | Performed by: ANESTHESIOLOGY

## 2017-09-27 PROCEDURE — 25000128 H RX IP 250 OP 636: Performed by: NURSE ANESTHETIST, CERTIFIED REGISTERED

## 2017-09-27 PROCEDURE — 27110028 ZZH OR GENERAL SUPPLY NON-STERILE: Performed by: OBSTETRICS & GYNECOLOGY

## 2017-09-27 PROCEDURE — 40000305 ZZH STATISTIC PRE PROC ASSESS I: Performed by: OBSTETRICS & GYNECOLOGY

## 2017-09-27 PROCEDURE — 58552 LAPARO-VAG HYST INCL T/O: CPT | Performed by: OBSTETRICS & GYNECOLOGY

## 2017-09-27 PROCEDURE — 25000128 H RX IP 250 OP 636: Performed by: OBSTETRICS & GYNECOLOGY

## 2017-09-27 PROCEDURE — 58552 LAPARO-VAG HYST INCL T/O: CPT | Performed by: ANESTHESIOLOGY

## 2017-09-27 PROCEDURE — 25000566 ZZH SEVOFLURANE, EA 15 MIN: Performed by: ANESTHESIOLOGY

## 2017-09-27 PROCEDURE — 86900 BLOOD TYPING SEROLOGIC ABO: CPT | Performed by: OBSTETRICS & GYNECOLOGY

## 2017-09-27 PROCEDURE — 71000014 ZZH RECOVERY PHASE 1 LEVEL 2 FIRST HR: Performed by: OBSTETRICS & GYNECOLOGY

## 2017-09-27 PROCEDURE — 36000063 ZZH SURGERY LEVEL 4 EA 15 ADDTL MIN: Performed by: OBSTETRICS & GYNECOLOGY

## 2017-09-27 PROCEDURE — 27210794 ZZH OR GENERAL SUPPLY STERILE: Performed by: OBSTETRICS & GYNECOLOGY

## 2017-09-27 RX ORDER — LIDOCAINE HYDROCHLORIDE 20 MG/ML
INJECTION, SOLUTION INFILTRATION; PERINEURAL PRN
Status: DISCONTINUED | OUTPATIENT
Start: 2017-09-27 | End: 2017-09-27

## 2017-09-27 RX ORDER — SCOLOPAMINE TRANSDERMAL SYSTEM 1 MG/1
1 PATCH, EXTENDED RELEASE TRANSDERMAL ONCE
Status: COMPLETED | OUTPATIENT
Start: 2017-09-27 | End: 2017-09-27

## 2017-09-27 RX ORDER — HYDROMORPHONE HYDROCHLORIDE 1 MG/ML
.3-.5 INJECTION, SOLUTION INTRAMUSCULAR; INTRAVENOUS; SUBCUTANEOUS
Status: DISCONTINUED | OUTPATIENT
Start: 2017-09-27 | End: 2017-09-28 | Stop reason: HOSPADM

## 2017-09-27 RX ORDER — FENTANYL CITRATE 50 UG/ML
INJECTION, SOLUTION INTRAMUSCULAR; INTRAVENOUS PRN
Status: DISCONTINUED | OUTPATIENT
Start: 2017-09-27 | End: 2017-09-27

## 2017-09-27 RX ORDER — OXYCODONE AND ACETAMINOPHEN 5; 325 MG/1; MG/1
1-2 TABLET ORAL EVERY 4 HOURS PRN
Status: DISCONTINUED | OUTPATIENT
Start: 2017-09-27 | End: 2017-09-28 | Stop reason: HOSPADM

## 2017-09-27 RX ORDER — FENTANYL CITRATE 50 UG/ML
25-50 INJECTION, SOLUTION INTRAMUSCULAR; INTRAVENOUS
Status: DISCONTINUED | OUTPATIENT
Start: 2017-09-27 | End: 2017-09-27 | Stop reason: HOSPADM

## 2017-09-27 RX ORDER — METOCLOPRAMIDE HYDROCHLORIDE 5 MG/ML
10 INJECTION INTRAMUSCULAR; INTRAVENOUS EVERY 6 HOURS PRN
Status: DISCONTINUED | OUTPATIENT
Start: 2017-09-27 | End: 2017-09-28 | Stop reason: HOSPADM

## 2017-09-27 RX ORDER — HYDROXYZINE HYDROCHLORIDE 25 MG/1
50 TABLET, FILM COATED ORAL EVERY 6 HOURS PRN
Status: DISCONTINUED | OUTPATIENT
Start: 2017-09-27 | End: 2017-09-28 | Stop reason: HOSPADM

## 2017-09-27 RX ORDER — SODIUM CHLORIDE, SODIUM LACTATE, POTASSIUM CHLORIDE, CALCIUM CHLORIDE 600; 310; 30; 20 MG/100ML; MG/100ML; MG/100ML; MG/100ML
INJECTION, SOLUTION INTRAVENOUS CONTINUOUS
Status: DISCONTINUED | OUTPATIENT
Start: 2017-09-27 | End: 2017-09-28 | Stop reason: CLARIF

## 2017-09-27 RX ORDER — HYDRALAZINE HYDROCHLORIDE 20 MG/ML
2.5-5 INJECTION INTRAMUSCULAR; INTRAVENOUS EVERY 10 MIN PRN
Status: DISCONTINUED | OUTPATIENT
Start: 2017-09-27 | End: 2017-09-27 | Stop reason: HOSPADM

## 2017-09-27 RX ORDER — ONDANSETRON 2 MG/ML
4 INJECTION INTRAMUSCULAR; INTRAVENOUS EVERY 6 HOURS PRN
Status: DISCONTINUED | OUTPATIENT
Start: 2017-09-27 | End: 2017-09-28 | Stop reason: HOSPADM

## 2017-09-27 RX ORDER — IBUPROFEN 800 MG/1
800 TABLET, FILM COATED ORAL 3 TIMES DAILY PRN
Status: DISCONTINUED | OUTPATIENT
Start: 2017-09-28 | End: 2017-09-28 | Stop reason: HOSPADM

## 2017-09-27 RX ORDER — NALOXONE HYDROCHLORIDE 0.4 MG/ML
.1-.4 INJECTION, SOLUTION INTRAMUSCULAR; INTRAVENOUS; SUBCUTANEOUS
Status: DISCONTINUED | OUTPATIENT
Start: 2017-09-27 | End: 2017-09-27 | Stop reason: HOSPADM

## 2017-09-27 RX ORDER — SODIUM CHLORIDE, SODIUM LACTATE, POTASSIUM CHLORIDE, CALCIUM CHLORIDE 600; 310; 30; 20 MG/100ML; MG/100ML; MG/100ML; MG/100ML
INJECTION, SOLUTION INTRAVENOUS CONTINUOUS
Status: DISCONTINUED | OUTPATIENT
Start: 2017-09-27 | End: 2017-09-27 | Stop reason: HOSPADM

## 2017-09-27 RX ORDER — CEFAZOLIN SODIUM 2 G/100ML
2 INJECTION, SOLUTION INTRAVENOUS
Status: COMPLETED | OUTPATIENT
Start: 2017-09-27 | End: 2017-09-27

## 2017-09-27 RX ORDER — ONDANSETRON 2 MG/ML
INJECTION INTRAMUSCULAR; INTRAVENOUS PRN
Status: DISCONTINUED | OUTPATIENT
Start: 2017-09-27 | End: 2017-09-27

## 2017-09-27 RX ORDER — PHENAZOPYRIDINE HYDROCHLORIDE 100 MG/1
200 TABLET, FILM COATED ORAL ONCE
Status: COMPLETED | OUTPATIENT
Start: 2017-09-27 | End: 2017-09-27

## 2017-09-27 RX ORDER — ONDANSETRON 4 MG/1
4 TABLET, ORALLY DISINTEGRATING ORAL EVERY 30 MIN PRN
Status: DISCONTINUED | OUTPATIENT
Start: 2017-09-27 | End: 2017-09-27 | Stop reason: HOSPADM

## 2017-09-27 RX ORDER — MEPERIDINE HYDROCHLORIDE 25 MG/ML
12.5 INJECTION INTRAMUSCULAR; INTRAVENOUS; SUBCUTANEOUS EVERY 5 MIN PRN
Status: DISCONTINUED | OUTPATIENT
Start: 2017-09-27 | End: 2017-09-27 | Stop reason: HOSPADM

## 2017-09-27 RX ORDER — ONDANSETRON 2 MG/ML
4 INJECTION INTRAMUSCULAR; INTRAVENOUS EVERY 30 MIN PRN
Status: DISCONTINUED | OUTPATIENT
Start: 2017-09-27 | End: 2017-09-27 | Stop reason: HOSPADM

## 2017-09-27 RX ORDER — KETOROLAC TROMETHAMINE 30 MG/ML
30 INJECTION, SOLUTION INTRAMUSCULAR; INTRAVENOUS
Status: DISCONTINUED | OUTPATIENT
Start: 2017-09-27 | End: 2017-09-27 | Stop reason: HOSPADM

## 2017-09-27 RX ORDER — PROCHLORPERAZINE MALEATE 5 MG
5-10 TABLET ORAL EVERY 6 HOURS PRN
Status: DISCONTINUED | OUTPATIENT
Start: 2017-09-27 | End: 2017-09-28 | Stop reason: HOSPADM

## 2017-09-27 RX ORDER — METOCLOPRAMIDE 10 MG/1
10 TABLET ORAL EVERY 6 HOURS PRN
Status: DISCONTINUED | OUTPATIENT
Start: 2017-09-27 | End: 2017-09-28 | Stop reason: HOSPADM

## 2017-09-27 RX ORDER — ALBUTEROL SULFATE 0.83 MG/ML
2.5 SOLUTION RESPIRATORY (INHALATION) EVERY 4 HOURS PRN
Status: DISCONTINUED | OUTPATIENT
Start: 2017-09-27 | End: 2017-09-27 | Stop reason: HOSPADM

## 2017-09-27 RX ORDER — NALOXONE HYDROCHLORIDE 0.4 MG/ML
.1-.4 INJECTION, SOLUTION INTRAMUSCULAR; INTRAVENOUS; SUBCUTANEOUS
Status: DISCONTINUED | OUTPATIENT
Start: 2017-09-27 | End: 2017-09-28 | Stop reason: HOSPADM

## 2017-09-27 RX ORDER — PROPOFOL 10 MG/ML
INJECTION, EMULSION INTRAVENOUS PRN
Status: DISCONTINUED | OUTPATIENT
Start: 2017-09-27 | End: 2017-09-27

## 2017-09-27 RX ORDER — KETOROLAC TROMETHAMINE 30 MG/ML
30 INJECTION, SOLUTION INTRAMUSCULAR; INTRAVENOUS EVERY 6 HOURS
Status: COMPLETED | OUTPATIENT
Start: 2017-09-27 | End: 2017-09-28

## 2017-09-27 RX ORDER — DEXAMETHASONE SODIUM PHOSPHATE 4 MG/ML
4 INJECTION, SOLUTION INTRA-ARTICULAR; INTRALESIONAL; INTRAMUSCULAR; INTRAVENOUS; SOFT TISSUE
Status: DISCONTINUED | OUTPATIENT
Start: 2017-09-27 | End: 2017-09-27 | Stop reason: HOSPADM

## 2017-09-27 RX ORDER — DEXAMETHASONE SODIUM PHOSPHATE 10 MG/ML
INJECTION, SOLUTION INTRAMUSCULAR; INTRAVENOUS PRN
Status: DISCONTINUED | OUTPATIENT
Start: 2017-09-27 | End: 2017-09-27

## 2017-09-27 RX ORDER — HYDROMORPHONE HYDROCHLORIDE 1 MG/ML
.3-.5 INJECTION, SOLUTION INTRAMUSCULAR; INTRAVENOUS; SUBCUTANEOUS EVERY 5 MIN PRN
Status: DISCONTINUED | OUTPATIENT
Start: 2017-09-27 | End: 2017-09-27 | Stop reason: HOSPADM

## 2017-09-27 RX ORDER — ONDANSETRON 4 MG/1
4 TABLET, ORALLY DISINTEGRATING ORAL EVERY 6 HOURS PRN
Status: DISCONTINUED | OUTPATIENT
Start: 2017-09-27 | End: 2017-09-28 | Stop reason: HOSPADM

## 2017-09-27 RX ORDER — ALUMINA, MAGNESIA, AND SIMETHICONE 2400; 2400; 240 MG/30ML; MG/30ML; MG/30ML
15-30 SUSPENSION ORAL EVERY 4 HOURS PRN
Status: DISCONTINUED | OUTPATIENT
Start: 2017-09-27 | End: 2017-09-28 | Stop reason: HOSPADM

## 2017-09-27 RX ORDER — LABETALOL HYDROCHLORIDE 5 MG/ML
10 INJECTION, SOLUTION INTRAVENOUS
Status: DISCONTINUED | OUTPATIENT
Start: 2017-09-27 | End: 2017-09-27 | Stop reason: HOSPADM

## 2017-09-27 RX ADMIN — ROCURONIUM BROMIDE 10 MG: 10 INJECTION INTRAVENOUS at 10:29

## 2017-09-27 RX ADMIN — SCOPALAMINE 1 PATCH: 1 PATCH, EXTENDED RELEASE TRANSDERMAL at 09:34

## 2017-09-27 RX ADMIN — TRANEXAMIC ACID 1 G: 100 INJECTION, SOLUTION INTRAVENOUS at 10:24

## 2017-09-27 RX ADMIN — KETOROLAC TROMETHAMINE 30 MG: 30 INJECTION, SOLUTION INTRAMUSCULAR; INTRAVENOUS at 14:42

## 2017-09-27 RX ADMIN — Medication 100 MG: at 10:29

## 2017-09-27 RX ADMIN — MIDAZOLAM HYDROCHLORIDE 1 MG: 1 INJECTION, SOLUTION INTRAMUSCULAR; INTRAVENOUS at 10:24

## 2017-09-27 RX ADMIN — FENTANYL CITRATE 50 MCG: 50 INJECTION, SOLUTION INTRAMUSCULAR; INTRAVENOUS at 13:23

## 2017-09-27 RX ADMIN — CEFAZOLIN SODIUM 2 G: 2 INJECTION, SOLUTION INTRAVENOUS at 10:19

## 2017-09-27 RX ADMIN — SODIUM CHLORIDE, POTASSIUM CHLORIDE, SODIUM LACTATE AND CALCIUM CHLORIDE: 600; 310; 30; 20 INJECTION, SOLUTION INTRAVENOUS at 09:35

## 2017-09-27 RX ADMIN — DEXAMETHASONE SODIUM PHOSPHATE 10 MG: 10 INJECTION, SOLUTION INTRAMUSCULAR; INTRAVENOUS at 10:29

## 2017-09-27 RX ADMIN — SODIUM CHLORIDE, POTASSIUM CHLORIDE, SODIUM LACTATE AND CALCIUM CHLORIDE: 600; 310; 30; 20 INJECTION, SOLUTION INTRAVENOUS at 17:52

## 2017-09-27 RX ADMIN — PHENAZOPYRIDINE HYDROCHLORIDE 200 MG: 100 TABLET, COATED ORAL at 09:33

## 2017-09-27 RX ADMIN — LIDOCAINE HYDROCHLORIDE 10 ML: 10; .005 INJECTION, SOLUTION EPIDURAL; INFILTRATION; INTRACAUDAL; PERINEURAL at 11:41

## 2017-09-27 RX ADMIN — METRONIDAZOLE 500 MG: 500 INJECTION, SOLUTION INTRAVENOUS at 10:30

## 2017-09-27 RX ADMIN — LIDOCAINE HYDROCHLORIDE 40 MG: 20 INJECTION, SOLUTION INFILTRATION; PERINEURAL at 10:29

## 2017-09-27 RX ADMIN — SODIUM CHLORIDE, POTASSIUM CHLORIDE, SODIUM LACTATE AND CALCIUM CHLORIDE: 600; 310; 30; 20 INJECTION, SOLUTION INTRAVENOUS at 11:42

## 2017-09-27 RX ADMIN — PROPOFOL 150 MG: 10 INJECTION, EMULSION INTRAVENOUS at 10:29

## 2017-09-27 RX ADMIN — MIDAZOLAM HYDROCHLORIDE 1 MG: 1 INJECTION, SOLUTION INTRAMUSCULAR; INTRAVENOUS at 10:29

## 2017-09-27 RX ADMIN — ONDANSETRON 4 MG: 2 INJECTION INTRAMUSCULAR; INTRAVENOUS at 11:45

## 2017-09-27 RX ADMIN — FENTANYL CITRATE 50 MCG: 50 INJECTION, SOLUTION INTRAMUSCULAR; INTRAVENOUS at 10:50

## 2017-09-27 RX ADMIN — FENTANYL CITRATE 50 MCG: 50 INJECTION, SOLUTION INTRAMUSCULAR; INTRAVENOUS at 10:29

## 2017-09-27 RX ADMIN — KETOROLAC TROMETHAMINE 30 MG: 30 INJECTION, SOLUTION INTRAMUSCULAR; INTRAVENOUS at 20:04

## 2017-09-27 RX ADMIN — FENTANYL CITRATE 50 MCG: 50 INJECTION, SOLUTION INTRAMUSCULAR; INTRAVENOUS at 10:24

## 2017-09-27 RX ADMIN — FENTANYL CITRATE 50 MCG: 50 INJECTION, SOLUTION INTRAMUSCULAR; INTRAVENOUS at 13:17

## 2017-09-27 RX ADMIN — OXYCODONE HYDROCHLORIDE AND ACETAMINOPHEN 1 TABLET: 5; 325 TABLET ORAL at 17:51

## 2017-09-27 RX ADMIN — FENTANYL CITRATE 50 MCG: 50 INJECTION, SOLUTION INTRAMUSCULAR; INTRAVENOUS at 10:55

## 2017-09-27 ASSESSMENT — PAIN DESCRIPTION - DESCRIPTORS
DESCRIPTORS: PRESSURE
DESCRIPTORS: PRESSURE

## 2017-09-27 ASSESSMENT — LIFESTYLE VARIABLES: TOBACCO_USE: 1

## 2017-09-27 ASSESSMENT — ANXIETY QUESTIONNAIRES: GAD7 TOTAL SCORE: 0

## 2017-09-27 NOTE — PLAN OF CARE
[facility] Inpatient Admission Note:    Patient admitted to 3106/3106-1 at approximately 1:45 via cart accompanied by sister from surgery . Report received from Isabel RN in SBAR format at 1:45 via face to face in room. Patient transferred to bed via self.. Patient is alert and oriented X 3, denies pain; rates at 0 on 0-10 scale.  Patient oriented to room, unit, hourly rounding, and plan of care. Explained admission packet and patient handbook with patient bill of rights brochure. Will continue to monitor and document as needed.     Inpatient Nursing criteria listed below was met:      Health care directives status obtained and documented: N/A      Care Everywhere authorization obtained N/A      MRSA swab completed for patient 65 years and older: N/A      Patient identifies a surrogate decision maker: Yes If yes, who:sister Malou  Contact Information:[phone]      Core Measure diagnosis present:N/A. If yes, state diagnosis: N/A       If initial lactic acid >2.0, repeat lactic acid drawn within one hour of arrival to unit: NA. If no, state reason: N/A      Vaccination assessment and education completed: Yes   Vaccinations received prior to admission: Pneumovax no  Influenza(seasonal)  NO   Vaccination(s) ordered: influenza vaccine      Clergy visit ordered if patient requests: No      Skin issues/needs documented: Yes      Isolation Patient: no Education given, correct sign in place and documentation row added to PCS:  Yes      Fall Prevention N/A: Care plan updated, education given and documented, sticker and magnet in place: N/A      Care Plan initiated: Yes      Education Documented (including assessment): Yes      Patient has discharge needs : No If yes, please explain:N/A

## 2017-09-27 NOTE — OP NOTE
[facility]    Pre-operative diagnosis: DYSMENORRHEA, PELVIC PAIN, MENORRHAGIA    Post-operative diagnosis Same, Pathology Pending.  Hemorrhagic Left Ovarian Cyst   Procedure: Procedure(s):  LAPAROSCOPIC ASSISTED VAGINAL HYSTERECTOMY, BILATERAL SALPINGECTOMY, LEFT OOPHORECTOMY, Cystoscopy - Wound Class: II-Clean Contaminated   Surgeon:  Assistant: MD Eugenia Sierra NP   Anesthesia: General    Estimated blood loss: Less than 50 ml   Blood transfusion: No transfusion was given during surgery   Drains: Reeves catheter   Specimens: Uterus, left ovary, bilateral fallopian tubes   Findings: Hemorrhagic appearing left ovarian cyst 3cm,  otherwise normal pelvic anatomy.  On cystoscopy there was no evidence of sutures or perforation and bilateral ureteral jets were noted post procedure.   Complications: None   Condition: Stable         OPERATIVE DICTATION: The patient was brought to the operating room and uneventfully placed under general anesthesia. She was prepped and draped in the dorsal lithotomy position and her bladder drained. The cervix was visualized with a weighted speculum and grasped anteriorly with a fine-tooth tenaculum. The cervix was gently dilated with Duran dilators and a uterine manipulating device placed. We then changed gloves and proceeded to the abdominal portion of the case. A 5 mm infraumbilical skin incision was performed with a scalpel. A Veress needle was introduced without difficulty and a water drop test performed. The abdomen was insufflated with several liters of carbon dioxide. A 5 mm trocar and the laparoscope were introduced. Visualization was excellent. Findings were as described above. Next, two lower lateral 5 mm trocars were placed under direct visualization. After initial exploration, the uterus was elevated. The LigaSure bipolar cutting cautery device was used to transect the Left IP ligament away from pelvic sidewall structures.  The dissection was  continued down through the mesosalpinx, broad,  and round ligaments adjacent to the uterus. . A bladder flap was initiated using sharp and blunt dissection and the uterine artery skeletonized within the cardinal ligament. The uterine artery was cauterized with the LigaSure. The right fallopian tube was elevated and the mesosalpinx was transected with the LigaSure bipolar cutting cautery device. The dissection was continued down through the utero-ovarian, round and broad ligament complexes. A bladder flap was initiated using sharp and blunt dissection and the uterine artery skeletonized within the cardinal ligament. The uterine artery was cauterized with the LigaSure.  The bladder flap was completed in the midline.   At this point, the pelvis was checked and found to be hemostatic. We then proceeded to the vaginal portion of the case. Excess carbon dioxide was expressed from the abdomen but the trocars left in place. The patient was placed in the high lithotomy position. The uterine manipulating device was removed and the cervix visualized with a weighted speculum. The cervix was grasped with two fine-toothed tenaculum. A circumferential cervical incision was performed with a scalpel. Posterior colpotomy was performed sharply without difficulty. The uterosacral ligaments were clamped, cut and suture ligated with 0 Vicryl. The bladder was then dissected anteriorly off the lower uterine segment and cervix and anterior colpotomy performed. A Jacqueline retractor was placed to displace the bladder superiorly. The remaining cardinal ligament complexes were clamped, cut and suture ligated with 0 Vicryl. The uterus was removed. A pack was placed to displace the bowel out of the pelvis. An external Knott suture was performed in the usual fashion using 0 Vicryl and plicating the uterosacral ligaments high in the pelvis. The Knott's suture was tagged. A reperitonealizing stitch of 0 Vicryl was placed in a pursestring fashion  incorporating the uterosacral ligament into the closure. The packing was removed and the pursestring tied. The vaginal cuff was closed with interrupted 2-0 Vicryl sutures with the uterosacral ligament complexes being incorporated it into the vaginal angle sutures. The cuff was irrigated and checked for hemostasis. The Knott's suture was tied with resulting excellent apical vaginal support. Cystoscopy was then performed using a 70-degree rigid cystoscope with normal saline as distention media. Visualization was excellent. Bilateral ureteral jets were noted. There is no evidence of bladder perforation or suture. The cystoscope was withdrawn and a Reeves catheter placed. We then changed gloves and re-proceeded with laparoscopy. The abdomen was reinsufflated with carbon dioxide. The pelvis was irrigated and found to be hemostatic. The trocars were then removed under direct visualization and we proceeded to closure. The subcutaneous spaces were irrigated and checked for hemostasis. The skin incisions were closed with surgical glue. There were no complications and the patient was transferred to the recovery room in excellent stable condition.   Chapo Szymanski MD  9/27/2017  12:51 PM

## 2017-09-27 NOTE — IP AVS SNAPSHOT
[facility]    [address] [place] [address]    [place] [address]    Phone:  [phone]    Fax:  [phone]                                       After Visit Summary   9/27/2017    Eugenia Earl    MRN: [id number]           After Visit Summary Signature Page     I have received my discharge instructions, and my questions have been answered. I have discussed any challenges I see with this plan with the nurse or doctor.    ..........................................................................................................................................  Patient/Patient Representative Signature      ..........................................................................................................................................  Patient Representative Print Name and Relationship to Patient    ..................................................               ................................................  Date                                            Time    ..........................................................................................................................................  Reviewed by Signature/Title    ...................................................              ..............................................  Date                                                            Time

## 2017-09-27 NOTE — INTERVAL H&P NOTE
History and physical reviewed on 9/27/2017.  Patient examined. No interval change in condition.  Reviewed goals, risks, alternatives for planned procedure.  LAVH/BS, with left oophorectomy.   Pt would like right ovary removed if diseased or severe endometriosis.  Menopausal risks discussed.  Also  risks of bleeding, infection, damage to nerves, blood vessels, bowel and bladder. Discussed recovery period and expected discomfort.. All questions were answered. Consent form reviewed and signed.        Chapo Szymanski MD  9:24 AM

## 2017-09-27 NOTE — ANESTHESIA CARE TRANSFER NOTE
Patient: Eugenia Earl    Procedure(s):  LAPAROSCOPIC ASSISTED VAGINAL HYSTERECTOMY, BILATERAL SALPINGECTOMY, LEFT OOPHORECTOMY, Cystoscopy - Wound Class: II-Clean Contaminated    Diagnosis: DYSMENORRHEA, ENDOMETRIOSIS, MENORRHAGIA, HYPERMENORRHEA, PERIMENOPAUSAL  Diagnosis Additional Information: No value filed.    Anesthesia Type:   General, ETT     Note:  Airway :Nasal Cannula  Patient transferred to:PACU        Vitals: (Last set prior to Anesthesia Care Transfer)    CRNA VITALS  9/27/2017 1145 - 9/27/2017 1226      9/27/2017             Pulse: 101    Ht Rate: 101    SpO2: 100 %    Resp Rate (observed): 15    Resp Rate (set): 8    EKG: Sinus rhythm                Electronically Signed By: PHOENIX Anderson CRNA  September 27, 2017  12:26 PM

## 2017-09-27 NOTE — PLAN OF CARE
Problem: Patient Care Overview  Goal: Plan of Care/Patient Progress Review  Outcome: No Change  Patient admitted to the [facility] floor this afternoon post vag/lap assist hysterectomy, patient was reporting an overwhelming urge to have a BM, subsequently patient was assisted by two staff to get up to the commode to try to facilitate a bowel movement to no avail, patient did pass a few small drops of yesi red blood in the commode, patient was administered Toradol for her abdominal discomfort, and was instructed that some abdominal discomfort is to be expected, patient verbalizes an understanding, patient is reporting she is feeling more pressure than pain,  the surgical incisions are clean dry intact and sealed with glue. Patient adequately makes her needs known and is denying feeling nauseated.

## 2017-09-27 NOTE — PLAN OF CARE
Problem: Patient Care Overview  Goal: Plan of Care/Patient Progress Review  Outcome: No Change  Alert & oriented, makes needs known. IV infusing without difficulty. Vitals signs stable, low grade temp 99.3.  Is moving self in bed, states she is bored already.  Reeves catheter in place draining clear, orange colored urine.  Lungs are clear, bowel sounds are active. Small amount of spotting on pad is present, blood is dark in color.  Pleasant and cooperative with cares.      Problem: Pain, Acute (Adult)  Goal: Identify Related Risk Factors and Signs and Symptoms  Related risk factors and signs and symptoms are identified upon initiation of Human Response Clinical Practice Guideline (CPG).   Outcome: No Change  Pain is controlled with Toradol 30 mg via IV every 6 hours.  Describes pain as a pressure to lower abdomen, feels like she has to have a bowel movement.  Also like a spasm.  Requested and received Percocet for pressure/spasm in lower abdomen at 1753, with good relief    Face to face report given with opportunity to observe patient.    Report given to VANESSA Armendariz   9/27/2017  11:24 PM

## 2017-09-27 NOTE — PLAN OF CARE
Face to face report given with opportunity to observe patient.    Report given to Elizabet Henderson RN  9/27/2017  4:13 PM

## 2017-09-27 NOTE — ANESTHESIA PREPROCEDURE EVALUATION
Anesthesia Evaluation     . Pt has had prior anesthetic.     No history of anesthetic complications          ROS/MED HX    ENT/Pulmonary:     (+)tobacco use, Past use , . .    Neurologic:  - neg neurologic ROS     Cardiovascular:  - neg cardiovascular ROS       METS/Exercise Tolerance:     Hematologic:  - neg hematologic  ROS       Musculoskeletal:   (+) , , other musculoskeletal- h/o L4-5 herniated disc w/ left leg radiculopathy s/p L4-5 LAMINECTOMY 9/30/2015      GI/Hepatic:  - neg GI/hepatic ROS       Renal/Genitourinary:     (+) Other Renal/ Genitourinary, DYSMENORRHEA, ENDOMETRIOSIS, MENORRHAGIA, HYPERMENORRHEA      Endo:  - neg endo ROS       Psychiatric:  - neg psychiatric ROS       Infectious Disease:  - neg infectious disease ROS       Malignancy:      - no malignancy   Other:    (+) No chance of pregnancy   - neg other ROS                 Physical Exam  Normal systems: cardiovascular, pulmonary and dental    Airway   Mallampati: III  TM distance: >3 FB  Neck ROM: full  Comment: Narrow aperture    Dental   (+) caps    Cardiovascular   Rhythm and rate: regular and normal      Pulmonary    breath sounds clear to auscultation                    Anesthesia Plan      History & Physical Review  History and physical reviewed and following examination; no interval change.    ASA Status:  2 .    NPO Status:  > 8 hours    Plan for General and ETT with Intravenous and Propofol induction. Maintenance will be Balanced.    PONV prophylaxis:  Ondansetron (or other 5HT-3), Scopolamine patch and Dexamethasone or Solumedrol  HCG Negative      Postoperative Care  Postoperative pain management:  IV analgesics and Oral pain medications.      Consents  Anesthetic plan, risks, benefits and alternatives discussed with:  Patient.  Use of blood products discussed: Yes.   Use of blood products discussed with Patient.  Consented to blood products.  .                          .

## 2017-09-27 NOTE — IP AVS SNAPSHOT
MRN:[id number]                      After Visit Summary   9/27/2017    Eugenia Earl    MRN: [id number]           Thank you!     Thank you for choosing [facility] for your care. Our goal is always to provide you with excellent care. Hearing back from our patients is one way we can continue to improve our services. Please take a few minutes to complete the written survey that you may receive in the mail after you visit with us. Thank you!        Patient Information     Date Of Birth          1970        Designated Caregiver       Most Recent Value    Caregiver    Will someone help with your care after discharge? yes    Name of designated caregiver Malou    Phone number of caregiver     Caregiver address [place]      About your hospital stay     You were admitted on:  September 27, 2017 You last received care in the:  [facility]    You were discharged on:  September 28, 2017       Who to Call     For medical emergencies, please call 911.  For non-urgent questions about your medical care, please call your primary care provider or clinic, [phone]  For questions related to your surgery, please call your surgery clinic        Attending Provider     Provider Specialty    Chapo Szymanski MD OB/Gyn       Primary Care Provider Office Phone # Fax #    Yan Villegas [phone]      Further instructions from your care team       No heavy lifting greater than 15-20 lbs for 4 weeks  No driving while on pain meds  Pelvic rest for 4 weeks  No vigorous activity, exercise, swim, bath for 4 weeks  Schedule Postop check Dr. Szymanski 4 weeks  Call Dr. Szymanski [phone] as necessary if problems in interim      Pending Results     No orders found for last 3 day(s).            Statement of Approval     Ordered          09/28/17 0837  I have reviewed and agree with all the recommendations and orders detailed in this document.  EFFECTIVE NOW     Approved and electronically signed  "by:  Eugenia Tran NP           09/28/17 0840  I have reviewed and agree with all the recommendations and orders detailed in this document.  EFFECTIVE NOW     Approved and electronically signed by:  Eugenia Tran NP             Admission Information     Date & Time Provider Department Dept. Phone    9/27/2017 Chapo Szymanski MD [facility] [phone]      Your Vitals Were     Blood Pressure Pulse Temperature Respirations Height Weight    133/55 82 99.7  F (37.6  C) (Tympanic) 16 1.575 m (5' 2\") 54.9 kg (121 lb)    Last Period Pulse Oximetry BMI (Body Mass Index)             08/10/2017 (Approximate) 92% 22.13 kg/m2         [employer] Information     [employer] gives you secure access to your electronic health record. If you see a primary care provider, you can also send messages to your care team and make appointments. If you have questions, please call your primary care clinic.  If you do not have a primary care provider, please call [phone] and they will assist you.        Care EveryWhere ID     This is your Care EveryWhere ID. This could be used by other organizations to access your [facility] medical records  [id number]        Equal Access to Services     MARQUEZ TRACEY AH: Hadii elis Bonilla, wasidda luvita, qaybta kaalmada jacklyn, aditi nieves. So [facility] [phone].    ATENCIÓN: Si habla español, tiene a mendoza disposición servicios gratuitos de asistencia lingüística. Ruchi al [phone].    We comply with applicable federal civil rights laws and [place] laws. We do not discriminate on the basis of race, color, national origin, age, disability sex, sexual orientation or gender identity.               Review of your medicines      START taking        Dose / Directions    oxyCODONE-acetaminophen 5-325 MG per tablet   Commonly known as:  PERCOCET   Used for:  Acute post-operative pain        Dose:  1-2 tablet   Take 1-2 tablets by mouth every 4 hours as needed for " moderate to severe pain   Quantity:  20 tablet   Refills:  0            Where to get your medicines      Some of these will need a paper prescription and others can be bought over the counter. Ask your nurse if you have questions.     Bring a paper prescription for each of these medications     oxyCODONE-acetaminophen 5-325 MG per tablet                Protect others around you: Learn how to safely use, store and throw away your medicines at www.disposemymeds.org.             Medication List: This is a list of all your medications and when to take them. Check marks below indicate your daily home schedule. Keep this list as a reference.      Medications           Morning Afternoon Evening Bedtime As Needed    oxyCODONE-acetaminophen 5-325 MG per tablet   Commonly known as:  PERCOCET   Take 1-2 tablets by mouth every 4 hours as needed for moderate to severe pain   Last time this was given:  1 tablet on 9/27/2017  5:51 PM                                          More Information        Caring for Yourself After Hysterectomy     Staying active can help you feel better in mind and body.     After you recover from your hysterectomy, you may feel better than you have in a long time. An active, healthy lifestyle and regular medical care can help you continue to feel good.  Being intimate  After a hysterectomy, sex can be as pleasurable as it was before. Follow your surgeon s instructions on when you can resume having intercourse. This is usually within 4 to 8 weeks after the procedure. Other types of sexual activity may be possible sooner. If you experience vaginal dryness during sex, use a lubricant. Be aware that a hysterectomy prevents pregnancy, but does not protect you against sexually transmitted diseases. If you have any concerns, discuss them with your partner and your healthcare provider.  Being aware of your emotions  After a hysterectomy, you may feel relieved to be free of symptoms. You may also feel sad about  the changes in your body. If your ovaries were removed, you may go through some natural mood swings as your hormones adjust. Note how you are feeling from day to day. Talk to your healthcare provider if you re concerned about emotions you are feeling.  Ongoing healthcare  Regular physical exams help to ensure your general health and well-being:    You will continue to need routine breast exams and pelvic exams. This includes Pap tests if you still have a cervix or if you have a history of certain types of dysplasia or cervical cancer.     If you re taking hormone therapy, you will need follow-up visits with your healthcare provider to fine-tune your dosage.  What to know about hormone therapy  Hormone therapy (HT) is medicine to replace the hormones made by your ovaries. It may be advised if your ovaries are removed and you have not yet gone through natural menopause. HT helps decrease hot flashes, vaginal dryness, and other symptoms of menopause. It may help reduce bone loss and lessen your risk of developing osteoporosis. But HT may also increase the risk of certain types of health problems in some women. Discuss the pros and cons of HT with your healthcare provider.   Date Last Reviewed: 3/1/2017    9955-1009 [employer]. [address] [place] [address]. All rights reserved. This information is not intended as a substitute for professional medical care. Always follow your healthcare professional's instructions.                Laparoscopic Hysterectomy: Your Home Recovery  When you leave the hospital, you ll receive instructions on caring for yourself at home. Following these instructions helps ensure a faster recovery. It often takes about 1 week to 4 weeks to recover from laparoscopic hysterectomy. But recovery time varies from woman to woman.    Taking care of yourself  Follow these tips to make your recovery as safe and comfortable as possible:    To avoid constipation, eat fruits,  vegetables, and whole grains. Drink plenty of water. Your healthcare provider may suggest that you use a laxative or a mild stool softener.    Ask your friends and family to help with chores and errands while you recover.    Do not lift anything over 10 pounds to avoid straining your incisions.    Do not get your incisions wet until your healthcare provider says it s OK to do so.    Do not put anything in the vagina until your provider says it s safe to do so. This includes using tampons and douches and having sexual intercourse.    Schedule follow-up visits with your healthcare provider.  When to call your healthcare provider  Call your healthcare provider if you notice any of these:    Chills or a fever of 100.4 F (38 C) or higher, or as advised    Bright red vaginal bleeding or a smelly discharge    Trouble urinating or burning during urination    Severe abdominal pain or bloating    A red, swollen, or draining smelly fluid from the incision sites    Trouble breathing or fainting    Swollen painful leg   Date Last Reviewed: 3/1/2017    8395-8763 [employer]. [address] [place] [address]. All rights reserved. This information is not intended as a substitute for professional medical care. Always follow your healthcare professional's instructions.                Discharge Instructions for Vaginal Hysterectomy   Vaginal hysterectomy is surgery to remove the uterus and often the cervix. It takes 4 to 6 weeks to recover from the procedure. Here s what you need to know about caring for yourself during this time. Follow these and any other instructions you are given.  Two types of vaginal hysterectomy  Vaginal hysterectomy is done through an incision inside the vagina. In some cases, 2 to 3 small incisions are also made in the skin. Instruments are then put through the small incisions to assist the procedure. This is called laparoscopically assisted vaginal hysterectomy or LAVH. If a  hysterectomy is done vaginally, the cervix is always removed as well.     Home care    Plan to rest at home for 3 to 5 days after the surgery.    Take all prescribed medicine exactly as directed.    Continue the coughing and deep breathing exercises you learned in the hospital.    If you had LAVH, you will have several small incisions on your belly. Keep the incisions clean and dry. Change bandages as instructed.    After LAVH, you may have pain in your shoulder. This is normal and due to gas used to expand your belly during the surgery. The pain may last up to 7 days.    If you have stitches inside your vagina, they will absorb over time and do not need to be taken out.    Use sanitary pads to absorb vaginal bleeding or discharge. Light bleeding is likely at first. You may have a brownish discharge for up to 6 weeks.    Do not use tampons or douches. They can cause infection.    Avoid constipation, which causes straining to pass stool. Eat fruits, vegetables, and whole-grain foods. Drink at least 8 glasses of fluid each day. If needed, ask your health care provider whether you should use a stool softener.  Activity  Full recovery may take 2 to 4 weeks. This varies from woman to woman. Increase your activities a little bit each day. While you are recovering:    Do not drive while you are taking opioid or other narcotic pain medicines.    Walk as often as you feel able. Walking prevents blood clots from forming. It also helps speed healing.    Climb stairs slowly. If you get tired, pause every few steps.    Do not do sports or strenuous activity until your health care provider says it s OK.    Avoid lifting anything heavier than 10 pounds for 4 to 6 weeks.    Ask others to help with chores and errands.    Bathe or shower according to your health care provider s instructions.    Do not have sex until your health care provider says it s OK.    Ask your health care provider when you can return to work.  Follow-up  care  You will visit the health care provider again to be sure you are healing well. Keep all follow-up appointments. Be sure to tell your health care provider if you have hot flashes, mood swings, or irritability. Medicine may help ease these symptoms.  Life after hysterectomy  Because the procedure removes your uterus, you will no longer have menstrual periods. You will not be able to become pregnant. Also, you may not need Pap tests if your cervix was removed. Your health care provider can discuss these and other changes with you.  When to call your health care provider  Call your health care provider right away if you have any of the following after your surgery:    Fever of 100.4 F (38 C) or higher    Vaginal bleeding that is bright red or soaks more than 1 pad in 60 minutes    Smelly or green-colored discharge from the vagina    Shortness of breath or chest pain    Nausea or comiting that continues for more than 1 day or that make it impossible to eat or drink    Inability to move the bowels for 3 days    Loose or watery stools 2 or more times a day OR bloody stools    Trouble urinating or burning during urination    Severe pain or bloating in your abdomen    Pain or swelling in your legs    For LAVH, redness, swelling, drainage, or increasing pain at an incision site    You feel unusually depressed or sad after the surgery   Date Last Reviewed: 5/10/2015    9180-0182 [employer]. [address] [place] [address], [place] [address]. All rights reserved. This information is not intended as a substitute for professional medical care. Always follow your healthcare professional's instructions.

## 2017-09-28 VITALS
WEIGHT: 121 LBS | HEART RATE: 82 BPM | OXYGEN SATURATION: 94 % | SYSTOLIC BLOOD PRESSURE: 144 MMHG | HEIGHT: 62 IN | DIASTOLIC BLOOD PRESSURE: 75 MMHG | BODY MASS INDEX: 22.26 KG/M2 | TEMPERATURE: 99.3 F | RESPIRATION RATE: 16 BRPM

## 2017-09-28 PROBLEM — Z90.710 STATUS POST LAPAROSCOPIC ASSISTED VAGINAL HYSTERECTOMY: Status: ACTIVE | Noted: 2017-09-28

## 2017-09-28 LAB
ANION GAP SERPL CALCULATED.3IONS-SCNC: 8 MMOL/L (ref 3–14)
BASOPHILS # BLD AUTO: 0 10E9/L (ref 0–0.2)
BASOPHILS NFR BLD AUTO: 0.1 %
BUN SERPL-MCNC: 10 MG/DL (ref 7–30)
CALCIUM SERPL-MCNC: 8.4 MG/DL (ref 8.5–10.1)
CHLORIDE SERPL-SCNC: 107 MMOL/L (ref 94–109)
CO2 SERPL-SCNC: 24 MMOL/L (ref 20–32)
CREAT SERPL-MCNC: 0.73 MG/DL (ref 0.52–1.04)
DIFFERENTIAL METHOD BLD: ABNORMAL
EOSINOPHIL # BLD AUTO: 0 10E9/L (ref 0–0.7)
EOSINOPHIL NFR BLD AUTO: 0 %
ERYTHROCYTE [DISTWIDTH] IN BLOOD BY AUTOMATED COUNT: 12.7 % (ref 10–15)
GFR SERPL CREATININE-BSD FRML MDRD: 86 ML/MIN/1.7M2
GLUCOSE SERPL-MCNC: 110 MG/DL (ref 70–99)
HCT VFR BLD AUTO: 37.6 % (ref 35–47)
HGB BLD-MCNC: 12.8 G/DL (ref 11.7–15.7)
IMM GRANULOCYTES # BLD: 0.1 10E9/L (ref 0–0.4)
IMM GRANULOCYTES NFR BLD: 0.6 %
LYMPHOCYTES # BLD AUTO: 2.4 10E9/L (ref 0.8–5.3)
LYMPHOCYTES NFR BLD AUTO: 15.2 %
MCH RBC QN AUTO: 29.2 PG (ref 26.5–33)
MCHC RBC AUTO-ENTMCNC: 34 G/DL (ref 31.5–36.5)
MCV RBC AUTO: 86 FL (ref 78–100)
MONOCYTES # BLD AUTO: 1 10E9/L (ref 0–1.3)
MONOCYTES NFR BLD AUTO: 6.5 %
NEUTROPHILS # BLD AUTO: 12 10E9/L (ref 1.6–8.3)
NEUTROPHILS NFR BLD AUTO: 77.6 %
NRBC # BLD AUTO: 0 10*3/UL
NRBC BLD AUTO-RTO: 0 /100
PLATELET # BLD AUTO: 287 10E9/L (ref 150–450)
POTASSIUM SERPL-SCNC: 4 MMOL/L (ref 3.4–5.3)
RBC # BLD AUTO: 4.39 10E12/L (ref 3.8–5.2)
SODIUM SERPL-SCNC: 139 MMOL/L (ref 133–144)
WBC # BLD AUTO: 15.4 10E9/L (ref 4–11)

## 2017-09-28 PROCEDURE — 85025 COMPLETE CBC W/AUTO DIFF WBC: CPT | Performed by: OBSTETRICS & GYNECOLOGY

## 2017-09-28 PROCEDURE — 40000275 ZZH STATISTIC RCP TIME EA 10 MIN

## 2017-09-28 PROCEDURE — 90686 IIV4 VACC NO PRSV 0.5 ML IM: CPT | Performed by: OBSTETRICS & GYNECOLOGY

## 2017-09-28 PROCEDURE — 36415 COLL VENOUS BLD VENIPUNCTURE: CPT | Performed by: OBSTETRICS & GYNECOLOGY

## 2017-09-28 PROCEDURE — 25000128 H RX IP 250 OP 636: Performed by: OBSTETRICS & GYNECOLOGY

## 2017-09-28 PROCEDURE — 80048 BASIC METABOLIC PNL TOTAL CA: CPT | Performed by: OBSTETRICS & GYNECOLOGY

## 2017-09-28 PROCEDURE — 90471 IMMUNIZATION ADMIN: CPT

## 2017-09-28 RX ORDER — OXYCODONE AND ACETAMINOPHEN 5; 325 MG/1; MG/1
1-2 TABLET ORAL EVERY 4 HOURS PRN
Qty: 20 TABLET | Refills: 0 | Status: SHIPPED | OUTPATIENT
Start: 2017-09-28 | End: 2017-10-12

## 2017-09-28 RX ADMIN — KETOROLAC TROMETHAMINE 30 MG: 30 INJECTION, SOLUTION INTRAMUSCULAR; INTRAVENOUS at 07:56

## 2017-09-28 RX ADMIN — KETOROLAC TROMETHAMINE 30 MG: 30 INJECTION, SOLUTION INTRAMUSCULAR; INTRAVENOUS at 02:50

## 2017-09-28 RX ADMIN — INFLUENZA A VIRUS A/MICHIGAN/45/2015 X-275 (H1N1) ANTIGEN (FORMALDEHYDE INACTIVATED), INFLUENZA A VIRUS A/HONG KONG/4801/2014 X-263B (H3N2) ANTIGEN (FORMALDEHYDE INACTIVATED), INFLUENZA B VIRUS B/PHUKET/3073/2013 ANTIGEN (FORMALDEHYDE INACTIVATED), AND INFLUENZA B VIRUS B/BRISBANE/60/2008 ANTIGEN (FORMALDEHYDE INACTIVATED) 0.5 ML: 15; 15; 15; 15 INJECTION, SUSPENSION INTRAMUSCULAR at 10:39

## 2017-09-28 ASSESSMENT — PAIN DESCRIPTION - DESCRIPTORS
DESCRIPTORS: PRESSURE
DESCRIPTORS: PRESSURE

## 2017-09-28 NOTE — PLAN OF CARE
Patient discharged at 10:50 am via wheel chair accompanied by staff. Prescriptions sent to patients preferred pharmacy. All belongings sent with patient.     Discharge instructions reviewed with patient, pt verbalized understanding and didn't present any questions at this time. Listed belongings gathered and returned to patient. All personal items returned to patient    Patient discharged to home.     Core Measures and Vaccines  Core Measures applicable during stay: No. If yes, state diagnosis: NA  Pneumonia and Influenza given prior to discharge, if indicated:  Influenza    Surgical Patient   Surgical Procedures during stay: yes  Did patient receive discharge instruction on wound care and recognition of infection symptoms? Yes    MISC  Follow up appointment made:  No, pt instructed to call.   Home and hospital aquired medications returned to patient: N/A  Patient reports pain was well managed at discharge: Yes

## 2017-09-28 NOTE — ANESTHESIA POSTPROCEDURE EVALUATION
Patient: Eugenia Earl    Procedure(s):  LAPAROSCOPIC ASSISTED VAGINAL HYSTERECTOMY, BILATERAL SALPINGECTOMY, LEFT OOPHORECTOMY, Cystoscopy - Wound Class: II-Clean Contaminated    Diagnosis:DYSMENORRHEA, ENDOMETRIOSIS, MENORRHAGIA, HYPERMENORRHEA, PERIMENOPAUSAL  Diagnosis Additional Information: No value filed.    Anesthesia Type:  General, ETT    Note:  Anesthesia Post Evaluation    Patient location during evaluation: PACU, Floor and Bedside  Patient participation: Able to fully participate in evaluation  Level of consciousness: awake and alert  Pain management: adequate  Airway patency: patent  Cardiovascular status: acceptable  Respiratory status: acceptable  Hydration status: stable  PONV: none     Anesthetic complications: None          Last vitals:  Vitals:    09/27/17 2001 09/28/17 0247 09/28/17 0656   BP: 139/76 131/63 133/55   Pulse:      Resp: 16 16 16   Temp: 99.1  F (37.3  C) 100.3  F (37.9  C) 99.7  F (37.6  C)   SpO2:  96% 92%         Electronically Signed By: Amadeo Lundberg MD  September 28, 2017  9:52 AM

## 2017-09-28 NOTE — PROGRESS NOTES
"[facility]  Daily Post-Op Note         Assessment and Plan:    Assessment:   Post-operative day #1  Procedure(s):  COMBINED LAPAROSCOPIC ASSISTED HYSTERECTOMY VAGINAL, SALPINGO-OOPHORECTOMY     Doing well.  Clean wound without signs of infection.  Normal healing wound.  No immediate surgical complications identified.  No excessive bleeding  Pain well-controlled.      Plan:   Ambulate  Advance activity as tolerated  Continue supportive and symptomatic treatment  Pain control measures            Interval History:   Stable.  Doing well.  Improving slowly.  Pain is reasonably controlled.  No fevers. Voiding.           Review of Systems:    CONSTITUTIONAL:NEGATIVE  for fever   R: NEGATIVE for significant cough or SOB  CV: NEGATIVE for chest pain, palpitations or peripheral edema  GI: NEGATIVE for nausea and vomiting  : negative for             Medications:   I have reviewed this patient's current medications          Physical Exam:   Vitals were reviewed /55  Pulse 82  Temp 99.7  F (37.6  C) (Tympanic)  Resp 16  Ht 1.575 m (5' 2\")  Wt 54.9 kg (121 lb)  LMP 08/10/2017 (Approximate)  SpO2 92%  BMI 22.13 kg/m2  Lungs clear  C-RRR  Abdomen soft, BS+. + flatus.   Wound clean and dry with minimal or no drainage.  Surrounding skin with minimal erythema.           Data:   All laboratory data related to this surgery reviewed    "

## 2017-09-28 NOTE — PLAN OF CARE
Problem: Patient Care Overview  Goal: Plan of Care/Patient Progress Review  Outcome: Improving    09/28/17 0754   Plan of Care Review   Progress improving   Pt VSS, slight temp this morning of 99.7, pt alert and oriented x 4, steady on her feet but did complain of slight dizzyness after attempting to void after fuentes removal. Pt tolerated fuentes removal well, 1100 mL out in bag during the night. Pt has had only a few specs of discharge this shift. Pt slept well much of the shift.     Face to face report given with opportunity to observe patient.     Report given to VANESSA Moseley RN  9/28/2017  7:58 AM           Problem: Pain, Acute (Adult)  Goal: Identify Related Risk Factors and Signs and Symptoms  Related risk factors and signs and symptoms are identified upon initiation of Human Response Clinical Practice Guideline (CPG).   Outcome: Improving    09/28/17 0250   Pain, Acute   Related Risk Factors (Acute Pain) surgery   Signs and Symptoms (Acute Pain) verbalization of pain descriptors       Goal: Acceptable Pain Control/Comfort Level  Patient will demonstrate the desired outcomes by discharge/transition of care.   Outcome: Improving    09/28/17 0754   Pain, Acute (Adult)   Acceptable Pain Control/Comfort Level making progress toward outcome   Pt pain well controlled with scheduled Toradol.

## 2017-09-28 NOTE — DISCHARGE SUMMARY
"Discharge Summary    Eugenia Earl MRN# [id number]   YOB: 1970 Age: 47 year old     Date of Admission:  9/27/2017  Date of Discharge:  9/28/2017  Admitting Physician:  Chapo Szymanski MD  Discharge Physician:  Eugenia Tran NP  Discharging Service:  Obstetrics and Gynecology     Primary Provider: Yan Villegas          Admission Diagnoses:   DYSMENORRHEA, ENDOMETRIOSIS, MENORRHAGIA, HYPERMENORRHEA, PERIMENOPAUSAL  Surgery, elective          Discharge Diagnosis:   Patient Active Problem List   Diagnosis     Dysmenorrhea     Endometriosis of uterus     Compression of lumbar nerve root     Surgery, elective     Status post laparoscopic assisted vaginal hysterectomy                Discharge Disposition:   Discharged to home           Condition on Discharge:   Discharge condition: Stable   Discharge vitals: Blood pressure 133/55, pulse 82, temperature 99.7  F (37.6  C), temperature source Tympanic, resp. rate 16, height 1.575 m (5' 2\"), weight 54.9 kg (121 lb), last menstrual period 08/10/2017, SpO2 92 %, not currently breastfeeding.   Code status on discharge: Full Code           Procedures / Labs / Imaging:   Invasive procedures: LAVH, B salpingectomy, Left oophorectomy           Medications Prior to Admission:     No prescriptions prior to admission.             Discharge Medications:     Current Discharge Medication List      START taking these medications    Details   oxyCODONE-acetaminophen (PERCOCET) 5-325 MG per tablet Take 1-2 tablets by mouth every 4 hours as needed for moderate to severe pain  Qty: 20 tablet, Refills: 0    Associated Diagnoses: Acute post-operative pain                   Consultations:   No consultations were requested during this admission             Brief History of Illness:   Eugenia Earl is a 47 year old female who was admitted for elective [facility]          Hospital Course:     Surgery, elective    Status post laparoscopic assisted vaginal hysterectomy    * No resolved hospital " problems. *                 Significant Results:   None             Pending Results:   None           Discharge Instructions and Follow-Up:   Discharge diet: Regular   Discharge activity: No lifting, driving, or strenuous exercise for 4 week(s)  No driving or operating machinery while on narcotic analgesics  Pelvic rest: abstain from intercourse and do not use tampons for 4 week(s)   Discharge follow-up: Follow up with me or Dr. Szymanski in 4 weeks   Wound care: None   Other instructions: None

## 2017-09-28 NOTE — DISCHARGE INSTRUCTIONS
No heavy lifting greater than 15-20 lbs for 4 weeks  No driving while on pain meds  Pelvic rest for 4 weeks  No vigorous activity, exercise, swim, bath for 4 weeks  Schedule Postop check Dr. Szymanski 4 weeks  Call Dr. Szymanski [phone] as necessary if problems in interim

## 2017-09-29 LAB — COPATH REPORT: NORMAL

## 2017-10-04 ENCOUNTER — TELEPHONE (OUTPATIENT)
Dept: OBGYN | Facility: OTHER | Age: 47
End: 2017-10-04

## 2017-10-04 NOTE — TELEPHONE ENCOUNTER
Pt given pathology results. States she is doing well and post op appt made for 10/19/17 at 400 pm.

## 2017-10-12 ENCOUNTER — OFFICE VISIT (OUTPATIENT)
Dept: OBGYN | Facility: OTHER | Age: 47
End: 2017-10-12
Attending: OBSTETRICS & GYNECOLOGY
Payer: COMMERCIAL

## 2017-10-12 VITALS
HEART RATE: 83 BPM | DIASTOLIC BLOOD PRESSURE: 76 MMHG | BODY MASS INDEX: 22.26 KG/M2 | SYSTOLIC BLOOD PRESSURE: 115 MMHG | HEIGHT: 62 IN | TEMPERATURE: 98.2 F | OXYGEN SATURATION: 97 % | WEIGHT: 121 LBS

## 2017-10-12 DIAGNOSIS — N73.9 PELVIC INFECTION IN FEMALE: Primary | ICD-10-CM

## 2017-10-12 PROCEDURE — 99213 OFFICE O/P EST LOW 20 MIN: CPT

## 2017-10-12 PROCEDURE — 99024 POSTOP FOLLOW-UP VISIT: CPT | Performed by: OBSTETRICS & GYNECOLOGY

## 2017-10-12 RX ORDER — METRONIDAZOLE 500 MG/1
500 TABLET ORAL 2 TIMES DAILY
Qty: 20 TABLET | Refills: 0 | Status: SHIPPED | OUTPATIENT
Start: 2017-10-12 | End: 2017-11-10

## 2017-10-12 RX ORDER — CIPROFLOXACIN 500 MG/1
500 TABLET, FILM COATED ORAL 2 TIMES DAILY
Qty: 20 TABLET | Refills: 0 | Status: SHIPPED | OUTPATIENT
Start: 2017-10-12 | End: 2017-11-10

## 2017-10-12 ASSESSMENT — PAIN SCALES - GENERAL: PAINLEVEL: SEVERE PAIN (6)

## 2017-10-12 NOTE — MR AVS SNAPSHOT
After Visit Summary   10/12/2017    Eugenia Earl    MRN: [id number]           Patient Information     Date Of Birth          1970        Visit Information        Provider Department      10/12/2017 11:30 AM Chapo Szymanski MD [facility]        Today's Diagnoses     Pelvic infection in female    -  1      Care Instructions    F/u 2 wks          Follow-ups after your visit        Your next 10 appointments already scheduled     Oct 19, 2017  4:00 PM CDT   (Arrive by 3:45 PM)   Post Op with Chapo Szymanski MD   [facility] [place] ([facility] [place] )    [address]  [place] [address]   [phone]              Who to contact     If you have questions or need follow up information about today's clinic visit or your schedule please contact [facility] directly at [phone].  Normal or non-critical lab and imaging results will be communicated to you by MyChart, letter or phone within 4 business days after the clinic has received the results. If you do not hear from us within 7 days, please contact the clinic through MyChart or phone. If you have a critical or abnormal lab result, we will notify you by phone as soon as possible.  Submit refill requests through [employer] or call your pharmacy and they will forward the refill request to us. Please allow 3 business days for your refill to be completed.          Additional Information About Your Visit        MyChart Information     [employer] gives you secure access to your electronic health record. If you see a primary care provider, you can also send messages to your care team and make appointments. If you have questions, please call your primary care clinic.  If you do not have a primary care provider, please call [phone] and they will assist you.        Care EveryWhere ID     This is your Care EveryWhere ID. This could be used by other organizations to access your [facility]  "records  [id number]        Your Vitals Were     Pulse Temperature Height Last Period Pulse Oximetry BMI (Body Mass Index)    83 98.2  F (36.8  C) (Tympanic) 5' 2\" (1.575 m) 08/10/2017 (Approximate) 97% 22.13 kg/m2       Blood Pressure from Last 3 Encounters:   10/12/17 115/76   09/28/17 144/75   09/26/17 116/72    Weight from Last 3 Encounters:   10/12/17 121 lb (54.9 kg)   09/27/17 121 lb (54.9 kg)   09/26/17 121 lb (54.9 kg)              Today, you had the following     No orders found for display         Today's Medication Changes          These changes are accurate as of: 10/12/17 11:59 PM.  If you have any questions, ask your nurse or doctor.               Start taking these medicines.        Dose/Directions    ciprofloxacin 500 MG tablet   Commonly known as:  CIPRO   Used for:  Pelvic infection in female        Dose:  500 mg   Take 1 tablet (500 mg) by mouth 2 times daily   Quantity:  20 tablet   Refills:  0       metroNIDAZOLE 500 MG tablet   Commonly known as:  FLAGYL   Used for:  Pelvic infection in female        Dose:  500 mg   Take 1 tablet (500 mg) by mouth 2 times daily   Quantity:  20 tablet   Refills:  0            Where to get your medicines      These medications were sent to [id number] - [place], [place] - [facility] [id number]     Phone:  [phone]     ciprofloxacin 500 MG tablet    metroNIDAZOLE 500 MG tablet                Primary Care Provider Office Phone # Fax #    Yan Villegas [phone]       [facility] [address] [place] [address]        Equal Access to Services     [facility] AH: Hadii elis kinsey Soelham, waaxda luqadaha, qaybta kaalmada jacklyn, aditi nieves. So [facility] [phone].    ATENCIÓN: Si habla español, tiene a mendoza disposición servicios gratuitos de asistencia lingüística. Ruchi romeo [phone].    We comply with applicable federal civil rights laws and " [place] laws. We do not discriminate on the basis of race, color, national origin, age, disability, sex, sexual orientation, or gender identity.            Thank you!     [facility] you for [facility]  [facility]. Our goal is always to provide you with excellent care. Hearing back from our patients is one way we can continue to improve our services. Please take a few minutes to complete the written survey that you may receive in the mail after your visit with us. Thank you!             Your Updated Medication List - Protect others around you: Learn how to safely use, store and throw away your medicines at www.disposemymeds.org.          This list is accurate as of: 10/12/17 11:59 PM.  Always use your most recent med list.                   Brand Name Dispense Instructions for use Diagnosis    ciprofloxacin 500 MG tablet    CIPRO    20 tablet    Take 1 tablet (500 mg) by mouth 2 times daily    Pelvic infection in female       metroNIDAZOLE 500 MG tablet    FLAGYL    20 tablet    Take 1 tablet (500 mg) by mouth 2 times daily    Pelvic infection in female

## 2017-10-13 NOTE — PROGRESS NOTES
"ABA Earl is a 47 year old female presents for post operative check. She is  2  week(s) status post LAV.  She reports she was doing well without significant pain or bleeding until this weekend started noticing more pain, DC.  Bowel and bladder function is satisfactory except for some loose stools now and initial constipation.  Mild nausea, no fever.   Denies incisional problems. Significant findings benign    O.  Blood pressure 115/76, pulse 83, temperature 98.2  F (36.8  C), temperature source Tympanic, height 5' 2\" (1.575 m), weight 121 lb (54.9 kg), last menstrual period 08/10/2017, SpO2 97 %, not currently breastfeeding.    Abd: soft, non-tender, non-distended. Incision clear, dry, and intact without evidence of infection.  Vagina well supported, suture line intact without erythema.  Pelvis.  No fluctuance or masses.  + ttp at vaginal cuff.    A. /P. Possible early po pelvic infection/cuff cellulitis.  Recommend initiation of antibiotic treatment with cipro/flagyl x 10 days.  SE's discussed. F/u 2 wks for schedule PO check or sooner prn if continued or worsening problems.          Chapo Szymanski  "

## 2017-10-19 ENCOUNTER — OFFICE VISIT (OUTPATIENT)
Dept: OBGYN | Facility: OTHER | Age: 47
End: 2017-10-19
Attending: OBSTETRICS & GYNECOLOGY
Payer: COMMERCIAL

## 2017-10-19 VITALS
HEIGHT: 62 IN | TEMPERATURE: 98.1 F | HEART RATE: 76 BPM | DIASTOLIC BLOOD PRESSURE: 60 MMHG | BODY MASS INDEX: 22.08 KG/M2 | SYSTOLIC BLOOD PRESSURE: 104 MMHG | WEIGHT: 120 LBS

## 2017-10-19 DIAGNOSIS — G89.18 POST-OPERATIVE PAIN: Primary | ICD-10-CM

## 2017-10-19 PROCEDURE — 99024 POSTOP FOLLOW-UP VISIT: CPT | Performed by: OBSTETRICS & GYNECOLOGY

## 2017-10-19 PROCEDURE — 99213 OFFICE O/P EST LOW 20 MIN: CPT

## 2017-10-19 ASSESSMENT — PAIN SCALES - GENERAL: PAINLEVEL: SEVERE PAIN (6)

## 2017-10-19 NOTE — MR AVS SNAPSHOT
After Visit Summary   10/19/2017    Eugenia Earl    MRN: [id number]           Patient Information     Date Of Birth          1970        Visit Information        Provider Department      10/19/2017 11:15 AM Chapo Szymanski MD [facility]        Today's Diagnoses     Post-operative pain    -  1      Care Instructions    F/u 3 wks          Follow-ups after your visit        Your next 10 appointments already scheduled     Oct 20, 2017  4:15 PM CDT   MR PELVIS W/O & W CONTRAST with HIMR1   HI MRI ([facility] )    [address] [address] [phone]           Take your medicines as usual, unless your doctor tells you not to. Bring a list of your current medicines to your exam (including vitamins, minerals and over-the-counter drugs).  You will be given intravenous contrast for this exam. To prepare:   The day before your exam, drink extra fluids at least six 8-ounce glasses (unless your doctor tells you to restrict your fluids).   Have a blood test (creatinine test) within 30 days of your exam. Go to your clinic or Diagnostic Imaging Department for this test.  The MRI machine uses a strong magnet. Please wear clothes without metal (snaps, zippers). A sweatsuit works well, or we may give you a hospital gown.  Please remove any body piercings and hair extensions before you arrive. You will also remove watches, jewelry, hairpins, wallets, dentures, partial dental plates and hearing aids. You may wear contact lenses, and you may be able to wear your rings. We have a safe place to keep your personal items, but it is safer to leave them at home.   **IMPORTANT** THE INSTRUCTIONS BELOW ARE ONLY FOR THOSE PATIENTS WHO HAVE BEEN TOLD THEY WILL RECEIVE SEDATION OR GENERAL ANESTHESIA DURING THEIR MRI PROCEDURE:  IF YOU WILL RECEIVE SEDATION (take medicine to help you relax during your exam):   You must get the medicine from your doctor before you arrive. Bring the  medicine to the exam. Do not take it at home.   Arrive one hour early. Bring someone who can take you home after the test. Your medicine will make you sleepy. After the exam, you may not drive, take a bus or take a taxi by yourself.   No eating 8 hours before your exam. You may have clear liquids up until 4 hours before your exam. (Clear liquids include water, clear tea, black coffee and fruit juice without pulp.)  IF YOU WILL RECEIVE ANESTHESIA (be asleep for your exam):   Arrive 1 1/2 hours early. Bring someone who can take you home after the test. You may not drive, take a bus or take a taxi by yourself.   No eating 8 hours before your exam. You may have clear liquids up until 4 hours before your exam. (Clear liquids include water, clear tea, black coffee and fruit juice without pulp.)  Please call the Imaging Department at your exam site with any questions.            Nov 10, 2017 10:30 AM CST   (Arrive by 10:15 AM)   SHORT with Chapo Szymanski MD   [facility] [place] ([facility] )    [address]  [facility] [address]   [phone]              Future tests that were ordered for you today     Open Future Orders        Priority Expected Expires Ordered    MR Pelvis w/o & w Contrast Routine 10/19/2017 10/31/2017 10/19/2017            Who to contact     If you have questions or need follow up information about today's clinic visit or your schedule please contact [facility] directly at [phone].  Normal or non-critical lab and imaging results will be communicated to you by MyChart, letter or phone within 4 business days after the clinic has received the results. If you do not hear from us within 7 days, please contact the clinic through SmartCare systemhart or phone. If you have a critical or abnormal lab result, we will notify you by phone as soon as possible.  Submit refill requests through [employer] or call your pharmacy and they will forward the refill request to us. Please allow  "3 business days for your refill to be completed.          Additional Information About Your Visit        MyChart Information     advisorCONNECThart gives you secure access to your electronic health record. If you see a primary care provider, you can also send messages to your care team and make appointments. If you have questions, please call your primary care clinic.  If you do not have a primary care provider, please call [phone] and they will assist you.        Care EveryWhere ID     This is your Care EveryWhere ID. This could be used by other organizations to access your [place] medical records  [id number]        Your Vitals Were     Pulse Temperature Height Last Period BMI (Body Mass Index)       76 98.1  F (36.7  C) (Tympanic) 1.575 m (5' 2\") 08/10/2017 (Approximate) 21.95 kg/m2        Blood Pressure from Last 3 Encounters:   10/19/17 104/60   10/12/17 115/76   09/28/17 144/75    Weight from Last 3 Encounters:   10/19/17 54.4 kg (120 lb)   10/12/17 54.9 kg (121 lb)   09/27/17 54.9 kg (121 lb)               Primary Care Provider Office Phone # Fax #    Yan TUTTLE MD Bakari [phone]       [facility] [address] [place] [address]        Equal Access to Services     [facility] KYUNG AH: Hadii elis ku hadasho Soomaali, waaxda luqadaha, qaybta kaalmada adeegyada, aditi castillo haynely parra . So [facility] [phone].    ATENCIÓN: Si habla español, tiene a mendoza disposición servicios gratuitos de asistencia lingüística. Llame al [phone].    We comply with applicable federal civil rights laws and [place] laws. We do not discriminate on the basis of race, color, national origin, age, disability, sex, sexual orientation, or gender identity.            Thank you!     Thank you for choosing [facility]  for your care. Our goal is always to provide you with excellent care. Hearing back from our patients is one way we can continue to improve our services. Please take a few " minutes to complete the written survey that you may receive in the mail after your visit with us. Thank you!             Your Updated Medication List - Protect others around you: Learn how to safely use, store and throw away your medicines at www.disposemymeds.org.          This list is accurate as of: 10/19/17  2:25 PM.  Always use your most recent med list.                   Brand Name Dispense Instructions for use Diagnosis    ciprofloxacin 500 MG tablet    CIPRO    20 tablet    Take 1 tablet (500 mg) by mouth 2 times daily    Pelvic infection in female       metroNIDAZOLE 500 MG tablet    FLAGYL    20 tablet    Take 1 tablet (500 mg) by mouth 2 times daily    Pelvic infection in female

## 2017-10-19 NOTE — PROGRESS NOTES
"ABA Earl is a 47 year old female presents for post operative check. She is  3  week(s) status post LAVH/BS/LO.  She reports doing well and denies significant pain or bleeding.  Bowel and bladder function is satisfactory. Denies incisional problems. Significant findings Left ovarian cyst.  She was treated for possible pelvic infection as she was having increased pain and DC.  Pain unchanged however.  No fevers, N/V GI or  sx.  Feels like uterine cramping.  She has not taken any pain meds since after surgery.    O.  Blood pressure 104/60, pulse 76, temperature 98.1  F (36.7  C), temperature source Tympanic, height 1.575 m (5' 2\"), weight 54.4 kg (120 lb), last menstrual period 08/10/2017, not currently breastfeeding.    Abd: soft, non-tender, non-distended. Incisions clear, dry, and intact without evidence of infection.  Vagina well supported.  Pelvis no masses or fluctuance.    A. /P. Satisfactory post-op check.  Persistant pelvic pain.  Will check pelvic CT to r/o abscess or other post-operative complications.      F/u 3 weeks. Tylenol/ibuprofen.  Continue restrictions.  If neg CT and continued pain consider Gabapentin.      Chapo Szymanski  "

## 2017-10-20 ENCOUNTER — HOSPITAL ENCOUNTER (OUTPATIENT)
Dept: CT IMAGING | Facility: HOSPITAL | Age: 47
Discharge: HOME | End: 2017-10-20
Attending: OBSTETRICS & GYNECOLOGY | Admitting: OBSTETRICS & GYNECOLOGY
Payer: COMMERCIAL

## 2017-10-20 DIAGNOSIS — G89.18 POST-OPERATIVE PAIN: ICD-10-CM

## 2017-10-20 PROCEDURE — 74177 CT ABD & PELVIS W/CONTRAST: CPT | Mod: TC

## 2017-10-20 PROCEDURE — 25000128 H RX IP 250 OP 636: Performed by: RADIOLOGY

## 2017-10-20 RX ORDER — IOPAMIDOL 612 MG/ML
100 INJECTION, SOLUTION INTRAVASCULAR ONCE
Status: COMPLETED | OUTPATIENT
Start: 2017-10-20 | End: 2017-10-20

## 2017-10-20 RX ADMIN — IOPAMIDOL 100 ML: 612 INJECTION, SOLUTION INTRAVENOUS at 16:05

## 2017-10-20 RX ADMIN — DIATRIZOATE MEGLUMINE AND DIATRIZOATE SODIUM 30 ML: 660; 100 SOLUTION ORAL; RECTAL at 16:05

## 2017-11-10 ENCOUNTER — OFFICE VISIT (OUTPATIENT)
Dept: OBGYN | Facility: OTHER | Age: 47
End: 2017-11-10
Attending: OBSTETRICS & GYNECOLOGY
Payer: COMMERCIAL

## 2017-11-10 VITALS
TEMPERATURE: 96.8 F | WEIGHT: 120 LBS | BODY MASS INDEX: 22.08 KG/M2 | HEART RATE: 72 BPM | HEIGHT: 62 IN | SYSTOLIC BLOOD PRESSURE: 114 MMHG | DIASTOLIC BLOOD PRESSURE: 76 MMHG

## 2017-11-10 DIAGNOSIS — Z98.890 POST-OPERATIVE STATE: Primary | ICD-10-CM

## 2017-11-10 PROCEDURE — 99213 OFFICE O/P EST LOW 20 MIN: CPT

## 2017-11-10 PROCEDURE — 99024 POSTOP FOLLOW-UP VISIT: CPT | Performed by: OBSTETRICS & GYNECOLOGY

## 2017-11-10 ASSESSMENT — PAIN SCALES - GENERAL: PAINLEVEL: NO PAIN (0)

## 2017-11-10 NOTE — MR AVS SNAPSHOT
"              After Visit Summary   11/10/2017    Eugenia Earl    MRN: [id number]           Patient Information     Date Of Birth          1970        Visit Information        Provider Department      11/10/2017 10:30 AM Chapo Szymanski MD [facility]        Today's Diagnoses     Post-operative state    -  1      Care Instructions    F/u prn          Follow-ups after your visit        Who to contact     If you have questions or need follow up information about today's clinic visit or your schedule please contact [facility] directly at [phone].  Normal or non-critical lab and imaging results will be communicated to you by studdexhart, letter or phone within 4 business days after the clinic has received the results. If you do not hear from us within 7 days, please contact the clinic through PredictionIOt or phone. If you have a critical or abnormal lab result, we will notify you by phone as soon as possible.  Submit refill requests through Stream[employer] or call your pharmacy and they will forward the refill request to us. Please allow 3 business days for your refill to be completed.          Additional Information About Your Visit        MyChart Information     [employer] gives you secure access to your electronic health record. If you see a primary care provider, you can also send messages to your care team and make appointments. If you have questions, please call your primary care clinic.  If you do not have a primary care provider, please call [phone] and they will assist you.        Care EveryWhere ID     This is your Care EveryWhere ID. This could be used by other organizations to access your [place] medical records  [id number]        Your Vitals Were     Pulse Temperature Height Last Period BMI (Body Mass Index)       72 96.8  F (36  C) 5' 2\" (1.575 m) 08/10/2017 (Approximate) 21.95 kg/m2        Blood Pressure from Last 3 Encounters:   11/10/17 114/76   10/19/17 104/60   10/12/17 115/76 "    Weight from Last 3 Encounters:   11/10/17 120 lb (54.4 kg)   10/19/17 120 lb (54.4 kg)   10/12/17 121 lb (54.9 kg)              Today, you had the following     No orders found for display       Primary Care Provider Office Phone # Fax #    Yan MARRY Villegas [phone]       [facility] [place] [address]  [place] [address]        Equal Access to Services     [facility] Hospital[facility] : Hadii aad ku hadasho Soomaali, waaxda luqadaha, qaybta kaalmada adeegyada, waxay idiin hayaan adeeg wilson parra . So [facility] [phone].    ATENCIÓN: Si habla español, tiene a mendoza disposición servicios gratuitos de asistencia lingüística. Ll[facility] [phone].    We comply with applicable federal civil rights laws and [place] laws. We do not discriminate on the basis of race, color, national origin, age, disability, sex, sexual orientation, or gender identity.            Thank you!     Thank you for choosing [facility]  for your care. Our goal is always to provide you with excellent care. Hearing back from our patients is one way we can continue to improve our services. Please take a few minutes to complete the written survey that you may receive in the mail after your visit with us. Thank you!             Your Updated Medication List - Protect others around you: Learn how to safely use, store and throw away your medicines at www.disposemymeds.org.      Notice  As of 11/10/2017  2:12 PM    You have not been prescribed any medications.

## 2017-11-10 NOTE — NURSING NOTE
"Chief Complaint   Patient presents with     Surgical Followup     6 week post op/ LAVH/AMY/PRITI       Initial /76  Pulse 72  Temp 96.8  F (36  C)  Ht 5' 2\" (1.575 m)  Wt 120 lb (54.4 kg)  LMP 08/10/2017 (Approximate)  BMI 21.95 kg/m2 Estimated body mass index is 21.95 kg/(m^2) as calculated from the following:    Height as of this encounter: 5' 2\" (1.575 m).    Weight as of this encounter: 120 lb (54.4 kg).  Medication Reconciliation: complete   SHERIN DAY      "

## 2017-11-10 NOTE — PROGRESS NOTES
"ABA Earl is a 47 year old female presents for post operative check. She is  6  week(s) status post LAVH.  She reports doing well and denies significant pain or bleeding.  Bowel and bladder function is satisfactory. Denies incisional problems.    O.  Blood pressure 114/76, pulse 72, temperature 96.8  F (36  C), height 5' 2\" (1.575 m), weight 120 lb (54.4 kg), last menstrual period 08/10/2017, not currently breastfeeding.    Abd: soft, non-tender, non-distended. Incisions clear, dry, and intact without evidence of infection.  Vagina well supported.      A. /P. Satisfactory post-op check.Released from restrictions.    F/u prn problems or at next annual examination.    Chapo Szymanski  "

## 2017-11-26 ENCOUNTER — HEALTH MAINTENANCE LETTER (OUTPATIENT)
Age: 47
End: 2017-11-26

## 2017-12-28 ENCOUNTER — OFFICE VISIT (OUTPATIENT)
Dept: CHIROPRACTIC MEDICINE | Facility: OTHER | Age: 47
End: 2017-12-28
Attending: CHIROPRACTOR
Payer: COMMERCIAL

## 2017-12-28 DIAGNOSIS — M54.2 CERVICALGIA: ICD-10-CM

## 2017-12-28 DIAGNOSIS — M99.02 SEGMENTAL AND SOMATIC DYSFUNCTION OF THORACIC REGION: Primary | ICD-10-CM

## 2017-12-28 DIAGNOSIS — M99.01 SEGMENTAL AND SOMATIC DYSFUNCTION OF CERVICAL REGION: ICD-10-CM

## 2017-12-28 PROCEDURE — 98940 CHIROPRACT MANJ 1-2 REGIONS: CPT | Mod: AT | Performed by: CHIROPRACTOR

## 2017-12-28 NOTE — MR AVS SNAPSHOT
After Visit Summary   12/28/2017    Eugenia Earl    MRN: [id number]           Patient Information     Date Of Birth          1970        Visit Information        Provider Department      12/28/2017 1:40 PM [facility] [place]        Today's Diagnoses     Segmental and somatic dysfunction of thoracic region    -  1    Cervicalgia        Segmental and somatic dysfunction of cervical region           Follow-ups after your visit        Your next 10 appointments already scheduled     Dec 29, 2017  9:50 AM CST   Return Visit with LAMAR Perkins ([facility])    [address]  [place] [address]   [phone]              Who to contact     If you have questions or need follow up information about today's clinic visit or your schedule please contact  [facility] directly at [phone].  Normal or non-critical lab and imaging results will be communicated to you by OberScharrerhart, letter or phone within 4 business days after the clinic has received the results. If you do not hear from us within 7 days, please contact the clinic through OberScharrerhart or phone. If you have a critical or abnormal lab result, we will notify you by phone as soon as possible.  Submit refill requests through Rendeevoo or call your pharmacy and they will forward the refill request to us. Please allow 3 business days for your refill to be completed.          Additional Information About Your Visit        MyChart Information     R[employer] gives you secure access to your electronic health record. If you see a primary care provider, you can also send messages to your care team and make appointments. If you have questions, please call your primary care clinic.  If you do not have a primary care provider, please call [phone] and they will assist you.        Care EveryWhere ID     This is your Care EveryWhere ID. This could be used by other organizations to access your  [place] medical records  [id number]        Your Vitals Were     Last Period                   08/10/2017 (Approximate)            Blood Pressure from Last 3 Encounters:   11/10/17 114/76   10/19/17 104/60   10/12/17 115/76    Weight from Last 3 Encounters:   11/10/17 120 lb (54.4 kg)   10/19/17 120 lb (54.4 kg)   10/12/17 121 lb (54.9 kg)              We Performed the Following     CHIROPRAC MANIP,SPINAL,1-2 REGIONS        Primary Care Provider Office Phone # Fax #    Yan MARRY Villegas [phone]       [facility] [place] [address] [facility] [place] [address]        Equal Access to Services     VIGN[facility] TRACEY : Hadii elis lutzo Chad, waaxda luqadaha, qaybta kaalmada grantyamayte, aditi parra . So [facility] [phone].    ATENCIÓN: Si habla español, tiene a mendoza disposición servicios gratuitos de asistencia lingüística. Llame al [phone].    We comply with applicable federal civil rights laws and [place] laws. We do not discriminate on the basis of race, color, national origin, age, disability, sex, sexual orientation, or gender identity.            Thank you!     Thank you for choosing  [facility]  for your care. Our goal is always to provide you with excellent care. Hearing back from our patients is one way we can continue to improve our services. Please take a few minutes to complete the written survey that you may receive in the mail after your visit with us. Thank you!             Your Updated Medication List - Protect others around you: Learn how to safely use, store and throw away your medicines at www.disposemymeds.org.      Notice  As of 12/28/2017  2:42 PM    You have not been prescribed any medications.

## 2017-12-28 NOTE — PROGRESS NOTES
Subjective Finding:    Chief compalint: Patient presents with:  Neck Pain  Back Pain:  with rib pain  , Pain Scale: 7/10, Intensity: sharp, Duration: 2 days, Radiating: no.    Date of injury:     Activities that the pain restricts:   Home/household/hobbies/social activities: no.  Work duties: no.  Sleep: yes.  Makes symptoms better: rest.  Makes symptoms worse: thoracic extension and thoracic flexion.  Have you seen anyone else for the symptoms? no  Work related: no.  Automobile related injury: no.    Objective and Assessment:    Posture Analysis:   High shoulder: right.  Head tilt: left.  High iliac crest: left.  Head carriage: neutral.  Thoracic Kyphosis: neutral.  Lumbar Lordosis: neutral.    Lumbar Range of Motion: extension decreased.  Cervical Range of Motion: extension decreased.  Thoracic Range of Motion: extension decreased.  Extremity Range of Motion: .    Palpation:   Trap tightness    Segmental dysfunction pre-treatment and treatment area: T7-6  . C567   T2    Assessment post-treatment:  Cervical: .  Thoracic: ROM increased and pain and tenderness decreased.  Lumbar: ROM inc.    Comments: .      Complicating Factors: .    Plan / Procedure:    Treatment plan: PRN.  Instructed patient: stretch as instructed at visit.  Short term goals: reduce pain.  Long term goals: restore normal function.  Prognosis: excellent.

## 2017-12-29 ENCOUNTER — OFFICE VISIT (OUTPATIENT)
Dept: CHIROPRACTIC MEDICINE | Facility: OTHER | Age: 47
End: 2017-12-29
Attending: CHIROPRACTOR
Payer: COMMERCIAL

## 2017-12-29 DIAGNOSIS — M54.2 CERVICALGIA: ICD-10-CM

## 2017-12-29 DIAGNOSIS — M99.01 SEGMENTAL AND SOMATIC DYSFUNCTION OF CERVICAL REGION: Primary | ICD-10-CM

## 2017-12-29 DIAGNOSIS — M99.02 SEGMENTAL AND SOMATIC DYSFUNCTION OF THORACIC REGION: ICD-10-CM

## 2017-12-29 PROCEDURE — 98940 CHIROPRACT MANJ 1-2 REGIONS: CPT | Mod: AT | Performed by: CHIROPRACTOR

## 2017-12-29 NOTE — MR AVS SNAPSHOT
After Visit Summary   12/29/2017    Eugenia Earl    MRN: [id number]           Patient Information     Date Of Birth          1970        Visit Information        Provider Department      12/29/2017 9:50 AM [facility]        Today's Diagnoses     Segmental and somatic dysfunction of cervical region    -  1    Cervicalgia        Segmental and somatic dysfunction of thoracic region           Follow-ups after your visit        Who to contact     If you have questions or need follow up information about today's clinic visit or your schedule please contact  [facility] directly at [phone].  Normal or non-critical lab and imaging results will be communicated to you by GT Nexushart, letter or phone within 4 business days after the clinic has received the results. If you do not hear from us within 7 days, please contact the clinic through Fluorofindert or phone. If you have a critical or abnormal lab result, we will notify you by phone as soon as possible.  Submit refill requests through Vaughn Burton or call your pharmacy and they will forward the refill request to us. Please allow 3 business days for your refill to be completed.          Additional Information About Your Visit        MyChart Information     Vaughn Burton gives you secure access to your electronic health record. If you see a primary care provider, you can also send messages to your care team and make appointments. If you have questions, please call your primary care clinic.  If you do not have a primary care provider, please call [phone] and they will assist you.        Care EveryWhere ID     This is your Care EveryWhere ID. This could be used by other organizations to access your [place] medical records  [id number]        Your Vitals Were     Last Period                   08/10/2017 (Approximate)            Blood Pressure from Last 3 Encounters:   11/10/17 114/76   10/19/17 104/60   10/12/17 115/76     Weight from Last 3 Encounters:   11/10/17 120 lb (54.4 kg)   10/19/17 120 lb (54.4 kg)   10/12/17 121 lb (54.9 kg)              We Performed the Following     CHIROPRAC MANIP,SPINAL,1-2 REGIONS        Primary Care Provider Office Phone # Fax #    Yan TUTTLE MD Bakari [phone]       [facility] [place] [address] [place], [place] [address] [address]        Equal Access to Services     [facility] : Hadii aad ku hadasho Soomaali, waaxda luqadaha, qaybta kaalmada adeegyada, waxay idiin hayaan grant parra . So [facility] [phone].    ATENCIÓN: Si habla español, tiene a mendoza disposición servicios gratuitos de asistencia lingüística. [facility] [phone].    We comply with applicable federal civil rights laws and [place] laws. We do not discriminate on the basis of race, color, national origin, age, disability, sex, sexual orientation, or gender identity.            Thank you!     Thank you for choosing  [facility]  for your care. Our goal is always to provide you with excellent care. Hearing back from our patients is one way we can continue to improve our services. Please take a few minutes to complete the written survey that you may receive in the mail after your visit with us. Thank you!             Your Updated Medication List - Protect others around you: Learn how to safely use, store and throw away your medicines at www.disposemymeds.org.      Notice  As of 12/29/2017 10:04 AM    You have not been prescribed any medications.

## 2017-12-29 NOTE — PROGRESS NOTES
Subjective Finding:    Chief compalint: Patient presents with:  Neck Pain: doing better since yesterday  , Pain Scale: 7/10, Intensity: sharp, Duration: 2 days, Radiating: no.    Date of injury:     Activities that the pain restricts:   Home/household/hobbies/social activities: no.  Work duties: no.  Sleep: yes.  Makes symptoms better: rest.  Makes symptoms worse: thoracic extension and thoracic flexion.  Have you seen anyone else for the symptoms? no  Work related: no.  Automobile related injury: no.    Objective and Assessment:    Posture Analysis:   High shoulder: right.  Head tilt: left.  High iliac crest: left.  Head carriage: neutral.  Thoracic Kyphosis: neutral.  Lumbar Lordosis: neutral.    Lumbar Range of Motion: extension decreased.  Cervical Range of Motion: extension decreased.  Thoracic Range of Motion: extension decreased.  Extremity Range of Motion: .    Palpation:   Trap tightness    Segmental dysfunction pre-treatment and treatment area: T7-6  . C567   T2    Assessment post-treatment:  Cervical: .  Thoracic: ROM increased and pain and tenderness decreased.  Lumbar: ROM inc.    Comments: .      Complicating Factors: .    Plan / Procedure:    Treatment plan: PRN.  Instructed patient: stretch as instructed at visit.  Short term goals: reduce pain.  Long term goals: restore normal function.  Prognosis: excellent.

## 2018-01-02 ENCOUNTER — HOSPITAL ENCOUNTER (EMERGENCY)
Facility: HOSPITAL | Age: 48
Discharge: HOME | End: 2018-01-02
Attending: FAMILY MEDICINE | Admitting: FAMILY MEDICINE
Payer: COMMERCIAL

## 2018-01-02 ENCOUNTER — APPOINTMENT (OUTPATIENT)
Dept: GENERAL RADIOLOGY | Facility: HOSPITAL | Age: 48
End: 2018-01-02
Attending: FAMILY MEDICINE
Payer: COMMERCIAL

## 2018-01-02 VITALS
OXYGEN SATURATION: 100 % | HEIGHT: 61 IN | TEMPERATURE: 97.4 F | RESPIRATION RATE: 18 BRPM | WEIGHT: 120 LBS | BODY MASS INDEX: 22.66 KG/M2 | SYSTOLIC BLOOD PRESSURE: 153 MMHG | DIASTOLIC BLOOD PRESSURE: 83 MMHG

## 2018-01-02 DIAGNOSIS — S60.221A CONTUSION OF RIGHT HAND, INITIAL ENCOUNTER: ICD-10-CM

## 2018-01-02 PROCEDURE — 73130 X-RAY EXAM OF HAND: CPT | Mod: TC,RT

## 2018-01-02 PROCEDURE — 25000128 H RX IP 250 OP 636: Performed by: FAMILY MEDICINE

## 2018-01-02 PROCEDURE — 99284 EMERGENCY DEPT VISIT MOD MDM: CPT | Mod: 25

## 2018-01-02 PROCEDURE — 96372 THER/PROPH/DIAG INJ SC/IM: CPT

## 2018-01-02 PROCEDURE — 99283 EMERGENCY DEPT VISIT LOW MDM: CPT | Performed by: FAMILY MEDICINE

## 2018-01-02 RX ORDER — KETOROLAC TROMETHAMINE 30 MG/ML
30 INJECTION, SOLUTION INTRAMUSCULAR; INTRAVENOUS ONCE
Status: COMPLETED | OUTPATIENT
Start: 2018-01-02 | End: 2018-01-02

## 2018-01-02 RX ORDER — IBUPROFEN 800 MG/1
800 TABLET, FILM COATED ORAL EVERY 8 HOURS PRN
Qty: 15 TABLET | Refills: 0 | Status: SHIPPED | OUTPATIENT
Start: 2018-01-02 | End: 2018-01-10

## 2018-01-02 RX ORDER — IBUPROFEN 800 MG/1
800 TABLET, FILM COATED ORAL ONCE
Status: DISCONTINUED | OUTPATIENT
Start: 2018-01-02 | End: 2018-01-02

## 2018-01-02 RX ADMIN — KETOROLAC TROMETHAMINE 30 MG: 30 INJECTION, SOLUTION INTRAMUSCULAR at 09:07

## 2018-01-02 NOTE — ED NOTES
Boxer splint applied LEILA Beltran to right hand for comfort and to protect ring and middle finger of right hand. Ice pack replenished. Reinforced elevation and ice for comfort and healing. Pt verbalized understanding.

## 2018-01-02 NOTE — ED AVS SNAPSHOT
[facility]    [address]    [place] [address]    Phone:  [phone]                                       Eugenia Earl   MRN: [id number]    Department:  [facility]   Date of Visit:  1/2/2018           Patient Information     Date Of Birth          1970        Your diagnoses for this visit were:     Contusion of right hand, initial encounter        You were seen by Augustina Dukes MD.        Discharge Instructions         Hand Contusion  You have a contusion. This is also called a bruise. There is swelling and some bleeding under the skin, but no broken bones. This injury generally takes a few days to a few weeks to heal.  During that time, the bruise will typically change in color from reddish, to purple-blue, to greenish-yellow, then to yellow-brown.  Home care    Elevate the hand to reduce pain and swelling. As much as possible, sit or lie down with the hand raised about the level of your heart. This is especially important during the first 48 hours.    Ice the hand to help reduce pain and swelling. Wrap a cold source (ice pack or ice cubes in a plastic bag) in a thin towel. Apply to the bruised area for 20 minutes every 1 to 2 hours the first day. Continue this 3 to 4 times a day until the pain and swelling goes away.    Unless another medicine was prescribed, you can take acetaminophen, ibuprofen, or naproxen to control pain. (If you have chronic liver or kidney disease or ever had a stomach ulcer or gastrointestinal bleeding, talk with your doctor before using these medicines.)  Follow up  Follow up with your healthcare provider or our staff as advised. Call if you are not improving within 1 to 2 weeks.  When to seek medical advice   Call your healthcare provider right away if you have any of the following:    Increased pain or swelling    Arm becomes cold, blue, numb or tingly    Signs of infection: Warmth, drainage, or increased redness or pain around the  bruise    Inability to move the injured hand     Frequent bruising for unknown reasons  Date Last Reviewed: 2/1/2017 2000-2017 [employer]. [address] [place] [address], [place] [address]. All rights reserved. This information is not intended as a substitute for professional medical care. Always follow your healthcare professional's instructions.             Review of your medicines      START taking        Dose / Directions Last dose taken    ibuprofen 800 MG tablet   Commonly known as:  ADVIL/MOTRIN   Dose:  800 mg   Quantity:  15 tablet        Take 1 tablet (800 mg) by mouth every 8 hours as needed for moderate pain   Refills:  0                Prescriptions were sent or printed at these locations (1 Prescription)                    [id number] - [place], [place] - [address]   [address] [place] [address]    Telephone:  [phone]   Fax:  [phone]   Hours:                  E-Prescribed (1 of 1)         ibuprofen (ADVIL/MOTRIN) 800 MG tablet                Procedures and tests performed during your visit     Splint    XR Hand Right G/E 3 Views      Orders Needing Specimen Collection     None      Pending Results     No orders found from 12/31/2017 to 1/3/2018.            Pending Culture Results     No orders found from 12/31/2017 to 1/3/2018.            Thank you for choosing [facility]       Thank you for choosing [facility] for your care. Our goal is always to provide you with excellent care. Hearing back from our patients is one way we can continue to improve our services. Please take a few minutes to complete the written survey that you may receive in the mail after you visit with us. Thank you!        Redfinhart Information     [employer] gives you secure access to your electronic health record. If you see a primary care provider, you can also send messages to your care team and make appointments. If you have questions, please call your primary care clinic.  If you do not have a  primary care provider, please call [phone] and they will assist you.        Care EveryWhere ID     This is your Care EveryWhere ID. This could be used by other organizations to access your [place] medical records  [id number]        Equal Access to Services     MARQUEZ TRACEY : Maria Elena Bonilla, maty pichardo, jennifer kaalhernandez villasenor, aditi nieves. So [facility] [phone].    ATENCIÓN: Si habla español, tiene a mendoza disposición servicios gratuitos de asistencia lingüística. Llame al [phone].    We comply with applicable federal civil rights laws and [place] laws. We do not discriminate on the basis of race, color, national origin, age, disability, sex, sexual orientation, or gender identity.            After Visit Summary       This is your record. Keep this with you and show to your community pharmacist(s) and doctor(s) at your next visit.

## 2018-01-02 NOTE — DISCHARGE INSTRUCTIONS
Hand Contusion  You have a contusion. This is also called a bruise. There is swelling and some bleeding under the skin, but no broken bones. This injury generally takes a few days to a few weeks to heal.  During that time, the bruise will typically change in color from reddish, to purple-blue, to greenish-yellow, then to yellow-brown.  Home care    Elevate the hand to reduce pain and swelling. As much as possible, sit or lie down with the hand raised about the level of your heart. This is especially important during the first 48 hours.    Ice the hand to help reduce pain and swelling. Wrap a cold source (ice pack or ice cubes in a plastic bag) in a thin towel. Apply to the bruised area for 20 minutes every 1 to 2 hours the first day. Continue this 3 to 4 times a day until the pain and swelling goes away.    Unless another medicine was prescribed, you can take acetaminophen, ibuprofen, or naproxen to control pain. (If you have chronic liver or kidney disease or ever had a stomach ulcer or gastrointestinal bleeding, talk with your doctor before using these medicines.)  Follow up  Follow up with your healthcare provider or our staff as advised. Call if you are not improving within 1 to 2 weeks.  When to seek medical advice   Call your healthcare provider right away if you have any of the following:    Increased pain or swelling    Arm becomes cold, blue, numb or tingly    Signs of infection: Warmth, drainage, or increased redness or pain around the bruise    Inability to move the injured hand     Frequent bruising for unknown reasons  Date Last Reviewed: 2/1/2017 2000-2017 [employer]. [address] [place] [address], [place] [address]. All rights reserved. This information is not intended as a substitute for professional medical care. Always follow your healthcare professional's instructions.

## 2018-01-02 NOTE — LETTER
January 2, 2018      To Whom It May Concern:      Eugenia Earl was seen in our Emergency Department today, 01/02/18.  I expect her condition to improve over the next day.  She may return to work/school when improved.    Sincerely,        Augustina Dukes MD

## 2018-01-02 NOTE — ED NOTES
Checked with XRay tech regarding ordering fingers or hand xray to get needed xray. Instructed to order right hand.

## 2018-01-02 NOTE — ED AVS SNAPSHOT
[facility]    [address]    [place] [address]    Phone:  [phone]                                       Eugenia Earl   MRN: [id number]    Department[facility]  [facility]   Date of Visit:  1/2/2018           After Visit Summary Signature Page     I have received my discharge instructions, and my questions have been answered. I have discussed any challenges I see with this plan with the nurse or doctor.    ..........................................................................................................................................  Patient/Patient Representative Signature      ..........................................................................................................................................  Patient Representative Print Name and Relationship to Patient    ..................................................               ................................................  Date                                            Time    ..........................................................................................................................................  Reviewed by Signature/Title    ...................................................              ..............................................  Date                                                            Time

## 2018-01-03 NOTE — ED PROVIDER NOTES
eMERGENCY dEPARTMENT eNCOUnter        CHIEF COMPLAINT    Chief Complaint   Patient presents with     Hand Injury     Right hand; ring and middle finger, caught in garage door this am       HPI    Eugenia Earl is a 47 year old female who presents with pain to the right hand after she got it caught under her garage door.    REVIEW OF SYSTEMS    Skin: No lacerations or puncture wounds  Musculoskeletal: right hand pain, no other joint or bony injury or pain  Neurologic: No loss of consciousness, no head injury, no paresthesias or focal distal extremity weakness    PAST MEDICAL & SURGICAL HISTORY    Past Medical History:   Diagnosis Date     Dysmenorrhea 04/16/2001     Endometriosis of uterus 05/10/2001     Endometriosis, site unspecified 06/18/2001     Follow-up examination, following other surgery 10/01/2008     Follow-up examination, following unspecified surgery 04/19/2001     Nonallopathic lesion of cervical region, not elsewhere classified 12/19/2001     Past Surgical History:   Procedure Laterality Date     LAMINECTOMY LUMBAR POSTERIOR MICROSCOPIC ONE LEVEL N/A 9/30/2015    Procedure: LAMINECTOMY LUMBAR POSTERIOR MICROSCOPIC ONE LEVEL;  Surgeon: Danyel Kaba MD;  Location: [facility]     LAPAROSCOPIC ASSISTED HYSTERECTOMY VAGINAL, BILATERAL SALPINGO-OOPHORECTOMY, COMBINED Left 9/27/2017    Procedure: COMBINED LAPAROSCOPIC ASSISTED HYSTERECTOMY VAGINAL, SALPINGO-OOPHORECTOMY;  LAPAROSCOPIC ASSISTED VAGINAL HYSTERECTOMY, BILATERAL SALPINGECTOMY, LEFT OOPHORECTOMY, Cystoscopy;  Surgeon: Chapo Szymanski MD;  Location: [facility]     TONSILLECTOMY       TUBAL LIGATION         CURRENT MEDICATIONS    Current Outpatient Rx   Medication Sig Dispense Refill     ibuprofen (ADVIL/MOTRIN) 800 MG tablet Take 1 tablet (800 mg) by mouth every 8 hours as needed for moderate pain 15 tablet 0       ALLERGIES    Allergies   Allergen Reactions     Nka [No Known Allergies]        SOCIAL & FAMILY HISTORY    Social History     Social  "History     Marital status: Single     Spouse name: N/A     Number of children: N/A     Years of education: N/A     Social History Main Topics     Smoking status: Former Smoker     Smokeless tobacco: Never Used     Alcohol use Yes      Comment: occasionally     Drug use: No     Sexual activity: Yes     Partners: Male     Other Topics Concern      Service No     Blood Transfusions No     Caffeine Concern No     Occupational Exposure No     Hobby Hazards No     Sleep Concern No     Stress Concern No     Weight Concern No     Special Diet No     Back Care Yes     Exercise Yes     Seat Belt No     Self-Exams Yes     Social History Narrative     Family History   Problem Relation Age of Onset     DIABETES Mother      Coronary Artery Disease Father      Hyperlipidemia Father          PHYSICAL EXAM    VITAL SIGNS: /83  Temp 97.4  F (36.3  C) (Tympanic)  Resp 18  Ht 1.549 m (5' 1\")  Wt 54.4 kg (120 lb)  LMP 08/10/2017 (Approximate)  SpO2 100%  BMI 22.67 kg/m2  Constitutional:  Well nourished, no acute distress  HENT:  Atraumatic, moist mucous membranes  NECK: normal range of motion,  supple   Respiratory:  No respiratory distress  Cardiovascular:  No JVD  Vascular: in tact, normal cap refill  Musculoskeletal:  Tenderness over fingers 3-5 on right hand, bruising, normal cap refill   Integument:  Well hydrated, no skin lacerations   Neurologic:  Awake alert, no slurred speech     RADIOLOGY   XRAY of the   Results for orders placed or performed during the hospital encounter of 01/02/18   XR Hand Right G/E 3 Views    Narrative    PROCEDURE:  XR HAND RT G/E 3 VW    HISTORY: Trauma;     COMPARISON:  None.    TECHNIQUE:  3 views of the right hand were obtained.    FINDINGS:  No fracture or dislocation is identified. The joint spaces  are preserved.        Impression    IMPRESSION: No acute fracture.      JOSE GARCIA MD             ED COURSE & MEDICAL DECISION MAKING   See chart for medications given during " "emergency department course  Vitals:    01/02/18 0801   BP: 153/83   Resp: 18   Temp: 97.4  F (36.3  C)   TempSrc: Tympanic   SpO2: 100%   Weight: 54.4 kg (120 lb)   Height: 1.549 m (5' 1\")         FINAL IMPRESSION    Right hand contusion    PLAN  She is placed in removable boxer splint.  RICE.  Note for work today.  Return as needed     Augustina Dukes MD  01/02/18 2018    "

## 2018-01-18 ENCOUNTER — OFFICE VISIT (OUTPATIENT)
Dept: CHIROPRACTIC MEDICINE | Facility: OTHER | Age: 48
End: 2018-01-18
Attending: CHIROPRACTOR
Payer: COMMERCIAL

## 2018-01-18 DIAGNOSIS — M99.01 SEGMENTAL AND SOMATIC DYSFUNCTION OF CERVICAL REGION: Primary | ICD-10-CM

## 2018-01-18 DIAGNOSIS — M99.03 SEGMENTAL AND SOMATIC DYSFUNCTION OF LUMBAR REGION: ICD-10-CM

## 2018-01-18 DIAGNOSIS — M54.2 CERVICALGIA: ICD-10-CM

## 2018-01-18 DIAGNOSIS — M99.02 SEGMENTAL AND SOMATIC DYSFUNCTION OF THORACIC REGION: ICD-10-CM

## 2018-01-18 PROCEDURE — 99211 OFF/OP EST MAY X REQ PHY/QHP: CPT | Mod: 25 | Performed by: CHIROPRACTOR

## 2018-01-18 PROCEDURE — 98941 CHIROPRACT MANJ 3-4 REGIONS: CPT | Mod: AT | Performed by: CHIROPRACTOR

## 2018-01-18 NOTE — MR AVS SNAPSHOT
After Visit Summary   1/18/2018    Eugenia Earl    MRN: [id number]           Patient Information     Date Of Birth          1970        Visit Information        Provider Department      1/18/2018 8:50 AM [facility]  [facility]        Today's Diagnoses     Segmental and somatic dysfunction of cervical region    -  1    Cervicalgia        Segmental and somatic dysfunction of lumbar region        Segmental and somatic dysfunction of thoracic region           Follow-ups after your visit        Who to contact     If you have questions or need follow up information about today's clinic visit or your schedule please contact  [facility] directly at [phone].  Normal or non-critical lab and imaging results will be communicated to you by Smart Hydro Powerhart, letter or phone within 4 business days after the clinic has received the results. If you do not hear from us within 7 days, please contact the clinic through Smart Hydro Powerhart or phone. If you have a critical or abnormal lab result, we will notify you by phone as soon as possible.  Submit refill requests through ParentPlus or call your pharmacy and they will forward the refill request to us. Please allow 3 business days for your refill to be completed.          Additional Information About Your Visit        MyChart Information     ParentPlus gives you secure access to your electronic health record. If you see a primary care provider, you can also send messages to your care team and make appointments. If you have questions, please call your primary care clinic.  If you do not have a primary care provider, please call [phone] and they will assist you.        Care EveryWhere ID     This is your Care EveryWhere ID. This could be used by other organizations to access your [place] medical records  [id number]        Your Vitals Were     Last Period                   08/10/2017 (Approximate)            Blood Pressure from Last 3 Encounters:    01/02/18 153/83   11/10/17 114/76   10/19/17 104/60    Weight from Last 3 Encounters:   01/02/18 120 lb (54.4 kg)   11/10/17 120 lb (54.4 kg)   10/19/17 120 lb (54.4 kg)              We Performed the Following     CHIROPRAC MANIP,SPINAL,3-4 REGIONS        Primary Care Provider Office Phone # Fax #    Yan MARRY Villegas [phone]       [facility] [address] [place] [address]        Equal Access to Services     [facility]: Hadii aad wayne hadasho Soelham, waaxda luqadaha, qaybta kaalmada jacklyn, aditi parra . So [facility] [phone].    ATENCIÓN: Si habla español, tiene a mendoza disposición servicios gratuitos de asistencia lingüística. [facility] [phone].    We comply with applicable federal civil rights laws and [place] laws. We do not discriminate on the basis of race, color, national origin, age, disability, sex, sexual orientation, or gender identity.            Thank you!     Thank you for choosing  [facility]  for your care. Our goal is always to provide you with excellent care. Hearing back from our patients is one way we can continue to improve our services. Please take a few minutes to complete the written survey that you may receive in the mail after your visit with us. Thank you!             Your Updated Medication List - Protect others around you: Learn how to safely use, store and throw away your medicines at www.disposemymeds.org.      Notice  As of 1/18/2018  9:37 AM    You have not been prescribed any medications.

## 2018-01-18 NOTE — PROGRESS NOTES
Subjective Finding:    Chief compalint: Patient presents with:  Neck Pain  Back Pain  , Pain Scale: 7/10, Intensity: sharp, Duration: 3 days, Radiating: no.    Date of injury:     Activities that the pain restricts:   Home/household/hobbies/social activities: no.  Work duties: no.  Sleep: yes.  Makes symptoms better: rest.  Makes symptoms worse: thoracic extension and thoracic flexion.  Have you seen anyone else for the symptoms? no  Work related: no.  Automobile related injury: no.    Objective and Assessment:    Posture Analysis:   High shoulder: right.  Head tilt: left.  High iliac crest: left.  Head carriage: neutral.  Thoracic Kyphosis: neutral.  Lumbar Lordosis: neutral.    Lumbar Range of Motion: extension decreased.  Cervical Range of Motion: extension decreased.  Thoracic Range of Motion: extension decreased.  Extremity Range of Motion: .    Palpation:   Trap tightness    Segmental dysfunction pre-treatment and treatment area: T7-6  . C567   T2  L1    Assessment post-treatment:  Cervical: .  Thoracic: ROM increased and pain and tenderness decreased.  Lumbar: ROM inc.    Comments: .      Complicating Factors: .    Plan / Procedure:    Treatment plan: PRN.  Instructed patient: stretch as instructed at visit.  Short term goals: reduce pain.  Long term goals: restore normal function.  Prognosis: excellent.

## 2018-02-01 ENCOUNTER — OFFICE VISIT (OUTPATIENT)
Dept: CHIROPRACTIC MEDICINE | Facility: OTHER | Age: 48
End: 2018-02-01
Attending: CHIROPRACTOR
Payer: COMMERCIAL

## 2018-02-01 DIAGNOSIS — M99.02 SEGMENTAL AND SOMATIC DYSFUNCTION OF THORACIC REGION: ICD-10-CM

## 2018-02-01 DIAGNOSIS — M99.03 SEGMENTAL AND SOMATIC DYSFUNCTION OF LUMBAR REGION: ICD-10-CM

## 2018-02-01 DIAGNOSIS — M99.01 SEGMENTAL AND SOMATIC DYSFUNCTION OF CERVICAL REGION: Primary | ICD-10-CM

## 2018-02-01 DIAGNOSIS — M54.2 CERVICALGIA: ICD-10-CM

## 2018-02-01 PROCEDURE — 98941 CHIROPRACT MANJ 3-4 REGIONS: CPT | Mod: AT | Performed by: CHIROPRACTOR

## 2018-02-01 NOTE — MR AVS SNAPSHOT
After Visit Summary   2/1/2018    Eugenia Earl    MRN: [id number]           Patient Information     Date Of Birth          1970        Visit Information        Provider Department      2/1/2018 2:10 PM [facility] [place]  [facility]        Today's Diagnoses     Segmental and somatic dysfunction of cervical region    -  1    Cervicalgia        Segmental and somatic dysfunction of lumbar region        Segmental and somatic dysfunction of thoracic region           Follow-ups after your visit        Who to contact     If you have questions or need follow up information about today's clinic visit or your schedule please contact  [facility] directly at [phone].  Normal or non-critical lab and imaging results will be communicated to you by Bugcrowdhart, letter or phone within 4 business days after the clinic has received the results. If you do not hear from us within 7 days, please contact the clinic through Bugcrowdhart or phone. If you have a critical or abnormal lab result, we will notify you by phone as soon as possible.  Submit refill requests through [employer] or call your pharmacy and they will forward the refill request to us. Please allow 3 business days for your refill to be completed.          Additional Information About Your Visit        MyChart Information     [employer] gives you secure access to your electronic health record. If you see a primary care provider, you can also send messages to your care team and make appointments. If you have questions, please call your primary care clinic.  If you do not have a primary care provider, please call [phone] and they will assist you.        Care EveryWhere ID     This is your Care EveryWhere ID. This could be used by other organizations to access your [facility] medical records  [id number]        Your Vitals Were     Last Period                   08/10/2017 (Approximate)            Blood Pressure from Last 3 Encounters:    01/02/18 153/83   11/10/17 114/76   10/19/17 104/60    Weight from Last 3 Encounters:   01/02/18 120 lb (54.4 kg)   11/10/17 120 lb (54.4 kg)   10/19/17 120 lb (54.4 kg)              We Performed the Following     CHIROPRAC MANIP,SPINAL,3-4 REGIONS        Primary Care Provider Office Phone # Fax #    Yan MARRY Villegas [phone]       [facility] [address] [place] [id number]        Equal Access to Services     [facility]: Hadii aad wayne hadasho Soelham, waaxda luqadaha, qaybta kaalmada jacklyn, aditi parra . So [facility] [phone].    ATENCIÓN: Si habla español, tiene a mendoza disposición servicios gratuitos de asistencia lingüística. [facility] [phone].    We comply with applicable federal civil rights laws and [place] laws. We do not discriminate on the basis of race, color, national origin, age, disability, sex, sexual orientation, or gender identity.            Thank you!     Thank you for choosing  [facility]  for your care. Our goal is always to provide you with excellent care. Hearing back from our patients is one way we can continue to improve our services. Please take a few minutes to complete the written survey that you may receive in the mail after your visit with us. Thank you!             Your Updated Medication List - Protect others around you: Learn how to safely use, store and throw away your medicines at www.disposemymeds.org.      Notice  As of 2/1/2018  2:44 PM    You have not been prescribed any medications.

## 2018-02-01 NOTE — PROGRESS NOTES
Subjective Finding:    Chief compalint: Patient presents with:  Back Pain: right arm and rib pain  Neck Pain  , Pain Scale: 7/10, Intensity: sharp, Duration: 3 days, Radiating: no.    Date of injury:     Activities that the pain restricts:   Home/household/hobbies/social activities: no.  Work duties: no.  Sleep: yes.  Makes symptoms better: rest.  Makes symptoms worse: thoracic extension and thoracic flexion.  Have you seen anyone else for the symptoms? no  Work related: no.  Automobile related injury: no.    Objective and Assessment:    Posture Analysis:   High shoulder: right.  Head tilt: left.  High iliac crest: left.  Head carriage: neutral.  Thoracic Kyphosis: neutral.  Lumbar Lordosis: neutral.    Lumbar Range of Motion: extension decreased.  Cervical Range of Motion: extension decreased.  Thoracic Range of Motion: extension decreased.  Extremity Range of Motion: .    Palpation:   Trap tightness    Segmental dysfunction pre-treatment and treatment area: T7-6  . C567   T2  L1    Assessment post-treatment:  Cervical: .  Thoracic: ROM increased and pain and tenderness decreased.  Lumbar: ROM inc.    Comments: .      Complicating Factors: .    Plan / Procedure:    Treatment plan: PRN.  Instructed patient: stretch as instructed at visit.  Short term goals: reduce pain.  Long term goals: restore normal function.  Prognosis: excellent.

## 2018-02-20 NOTE — INTERVAL H&P NOTE
nlhp   Bi-Rhombic Flap Text: The defect edges were debeveled with a #15 scalpel blade.  Given the location of the defect and the proximity to free margins a bi-rhombic flap was deemed most appropriate.  Using a sterile surgical marker, an appropriate rhombic flap was drawn incorporating the defect. The area thus outlined was incised deep to adipose tissue with a #15 scalpel blade.  The skin margins were undermined to an appropriate distance in all directions utilizing iris scissors.

## 2018-03-16 ENCOUNTER — OFFICE VISIT (OUTPATIENT)
Dept: CHIROPRACTIC MEDICINE | Facility: OTHER | Age: 48
End: 2018-03-16
Attending: CHIROPRACTOR
Payer: COMMERCIAL

## 2018-03-16 DIAGNOSIS — M54.2 CERVICALGIA: ICD-10-CM

## 2018-03-16 DIAGNOSIS — M99.01 SEGMENTAL AND SOMATIC DYSFUNCTION OF CERVICAL REGION: ICD-10-CM

## 2018-03-16 DIAGNOSIS — M99.02 SEGMENTAL AND SOMATIC DYSFUNCTION OF THORACIC REGION: Primary | ICD-10-CM

## 2018-03-16 DIAGNOSIS — M99.03 SEGMENTAL AND SOMATIC DYSFUNCTION OF LUMBAR REGION: ICD-10-CM

## 2018-03-16 PROCEDURE — 98941 CHIROPRACT MANJ 3-4 REGIONS: CPT | Mod: AT | Performed by: CHIROPRACTOR

## 2018-03-16 NOTE — MR AVS SNAPSHOT
After Visit Summary   3/16/2018    Eugenia Earl    MRN: [id number]           Patient Information     Date Of Birth          1970        Visit Information        Provider Department      3/16/2018 10:40 AM [facility] [place]  [facility]ia In[facility]        Today's Diagnoses     Segmental and somatic dysfunction of thoracic region    -  1    Cervicalgia        Segmental and somatic dysfunction of lumbar region        Segmental and somatic dysfunction of cervical region           Follow-ups after your visit        Who to contact     If you have questions or need follow up information about today's clinic visit or your schedule please contact  [facility] INGRAM directly at [phone].  Normal or non-critical lab and imaging results will be communicated to you by Blue Calypsohart, letter or phone within 4 business days after the clinic has received the results. If you do not hear from us within 7 days, please contact the clinic through [employer] Cal[employer]hart or phone. If you have a critical or abnormal lab result, we will notify you by phone as soon as possible.  Submit refill requests through [employer] or call your pharmacy and they will forward the refill request to us. Please allow 3 business days for your refill to be completed.          Additional Information About Your Visit        MyChart Information     [employer] gives you secure access to your electronic health record. If you see a primary care provider, you can also send messages to your care team and make appointments. If you have questions, please call your primary care clinic.  If you do not have a primary care provider, please call [phone] and they will assist you.        Care EveryWhere ID     This is your Care EveryWhere ID. This could be used by other organizations to access your [place] medical records  [id number]        Your Vitals Were     Last Period                   08/10/2017 (Approximate)            Blood Pressure from Last 3 Encounters:    01/02/18 153/83   11/10/17 114/76   10/19/17 104/60    Weight from Last 3 Encounters:   01/02/18 120 lb (54.4 kg)   11/10/17 120 lb (54.4 kg)   10/19/17 120 lb (54.4 kg)              We Performed the Following     CHIROPRAC MANIP,SPINAL,3-4 REGIONS        Primary Care Provider Office Phone # Fax #    Yna MARRY Villegas [phone]       [facility] [address] [place] [address]        Equal Access to Services     [facility]: Hadii elis black hadasho Soelham, waaxda luqadaha, qaybta kaalmada jacklyn, aditi parra . So [facility] [phone].    ATENCIÓN: Si habla español, tiene a mendoza disposición servicios gratuitos de asistencia lingüística. Ll[facility]arietta [facility] [phone].    We comply with applicable federal civil rights laws and [place] laws. We do not discriminate on the basis of race, color, national origin, age, disability, sex, sexual orientation, or gender identity.            Thank you!     Thank you for choosing  [facility]  for your care. Our goal is always to provide you with excellent care. Hearing back from our patients is one way we can continue to improve our services. Please take a few minutes to complete the written survey that you may receive in the mail after your visit with us. Thank you!             Your Updated Medication List - Protect others around you: Learn how to safely use, store and throw away your medicines at www.disposemymeds.org.      Notice  As of 3/16/2018 11:59 PM    You have not been prescribed any medications.

## 2018-03-19 NOTE — PROGRESS NOTES
Subjective Finding:    Chief compalint: Patient presents with:  Back Pain  Neck Pain  , Pain Scale: 7/10, Intensity: sharp, Duration: 3 days, Radiating: no.    Date of injury:     Activities that the pain restricts:   Home/household/hobbies/social activities: no.  Work duties: no.  Sleep: yes.  Makes symptoms better: rest.  Makes symptoms worse: thoracic extension and thoracic flexion.  Have you seen anyone else for the symptoms? no  Work related: no.  Automobile related injury: no.    Objective and Assessment:    Posture Analysis:   High shoulder: right.  Head tilt: left.  High iliac crest: left.  Head carriage: neutral.  Thoracic Kyphosis: neutral.  Lumbar Lordosis: neutral.    Lumbar Range of Motion: extension decreased.  Cervical Range of Motion: extension decreased.  Thoracic Range of Motion: extension decreased.  Extremity Range of Motion: .    Palpation:   Trap tightness    Segmental dysfunction pre-treatment and treatment area: T7-6  . C567   T2  L1    Assessment post-treatment:  Cervical: .  Thoracic: ROM increased and pain and tenderness decreased.  Lumbar: ROM inc.    Comments: .      Complicating Factors: .    Plan / Procedure:    Treatment plan: PRN.  Instructed patient: stretch as instructed at visit.  Short term goals: reduce pain.  Long term goals: restore normal function.  Prognosis: excellent.

## 2018-07-05 ENCOUNTER — TELEPHONE (OUTPATIENT)
Dept: TRANSPLANT | Facility: CLINIC | Age: 48
End: 2018-07-05

## 2018-07-05 NOTE — TELEPHONE ENCOUNTER
"[facility] BREEZE  [id number]      LIVING KIDNEY DONOR EVALUATION  Donor First Name Eugenia Donor MRN    Donor Last Name Mady Completed 2018 11:03 AM    1970 Record ID [id number]   BREEZE Screen PASSED     Intended Recipient  Recipient First Name Elton Recipient MRN    Recipient Last Name Garcia Relationship Not related (Other)   Recipient  1951 Recipient Diagnosis    Recipient's ABO      Donor Information  Age 48 Gender Female   Ht 155 cm (5' 1'') Race    Wt 48.9 kg (108 lbs) Ethnicity Not /   BMI 20.50 kg/m  Preferred Language English      Required No     Blood Type B   Demographics  Home Address [address] [place] [address] # [phone]   City [place] Type Mobile   State [place] Alternate #    Zip Code [address] Type    Country [place] Preferred Contact day Mon, Fri, Th, Wed, Tue   Email  Preferred Contact time 09:00 AM-11:00 AM   Donor's Medical Information  Medical History Bursitis/Tendonitis NOS   Endometriosis   Herniated Disc (Lumbar)   History of pregnancy   Sciatica   Spinal Cord Injury Medications None Reported   Surgical History Hysterectomy and Oophorectomy   Lumbar Laminectomy and/or Discectomy   Vaginal Hysterectomy Allergies NKDA   Social History EtOH: Rare (1-2 drinks/month)   Illicit Drug Use: Current: Marijuana   Tobacco: Remote; Quit ; ( ppd x 3 years) Self-Reported Functional Status \"I am able to participate in moderate recreational activities like golf, double tennis, dancing, throwing a baseball or football\"   Family Medical History Cancer (denies)   Diabetes (Mother)   Heart Disease (Grandparent, Father)   Hypertension (Sibling)   Kidney Disease (denies)   Kidney Stones (denies) Exercise Frequency Exercise (Not on a regular basis)   Review of Organ Systems  Review of Systems Airway or Lungs: No   Blood Disorder: No   Cancer: No   Diabetes,Thyroid,Adrenal,Endocrine Disorder: No   Digestive or Liver: No   Female Health: Yes   Heart " or Circulatory System: No   Immune Diseases: No   Kidneys and Bladder: No   Muscles,Bones,Joints: Yes   Neuro: Yes   Psych: No   Donor's Social Information  Marital Status Single Living Accommodation Owns own home/apartment   Level of Education Some college education Living Arrangement With relatives other than spouse, parents   Employment Status Full Time Concerns: health and life insurance No   Employer [employer] Concerns: job security and lost income No   Occupation      Medical Insurance Status Has medical insurance     High Risk Behavior  High Risk Behaviors Blood transfusion < 12 months. (NO)   Commercial sex < 12 months. (NO)   Illicit IV drug use < 5yrs. (NO)   Other high risk sexual contact < 12 months. (NO)   Reason for Donation  Referral Friend or Family of Tx Candidate Reason for Donation Bill and his family are very important to me and i feel being a donor is one of the best gifts in life.   Permission to Disclose Inquiry Yes Patient Comments    Donor Motivation Level Highly motivated donor     PCP Contact  PCP Name Dr. Yan Villegas [facility]   PCP [facility]   PCP Phone [phone]   Emergency Contact  First Name Elías First Name Wendi   Last Name Mady Last Name Sarah   Phone # [phone] Phone # [phone]   Phone Type Home Phone Type Mobile   Relationship Father Relationship Friend or Other   Office Use  Reviewed By    Reviewed 7/2/2018 8:39 AM   Admin Folder Accept   Comments 7/2 - Passed Eval   Lost for Followup    Extended Comments    BREEZE ID [id number] survey status completed   Activity History  Call  Task    Due Date 7/2/2018   Last Modified Date/Time 7/2/2018 10:18 AM   Comments

## 2018-07-06 DIAGNOSIS — Z00.5 TRANSPLANT DONOR EVALUATION: Primary | ICD-10-CM

## 2018-07-06 NOTE — TELEPHONE ENCOUNTER
Is abo compat. Interested in PEP. Hx in 2015 of Herniated Disc/Spinal Cord Injury. States it's OK now.No meds.Will send Phase 1 orders/pkt.

## 2018-07-09 ENCOUNTER — HOSPITAL ENCOUNTER (EMERGENCY)
Facility: HOSPITAL | Age: 48
Discharge: HOME | End: 2018-07-09
Attending: FAMILY MEDICINE | Admitting: FAMILY MEDICINE
Payer: COMMERCIAL

## 2018-07-09 ENCOUNTER — APPOINTMENT (OUTPATIENT)
Dept: CT IMAGING | Facility: HOSPITAL | Age: 48
End: 2018-07-09
Attending: FAMILY MEDICINE
Payer: COMMERCIAL

## 2018-07-09 ENCOUNTER — APPOINTMENT (OUTPATIENT)
Dept: GENERAL RADIOLOGY | Facility: HOSPITAL | Age: 48
End: 2018-07-09
Attending: FAMILY MEDICINE
Payer: COMMERCIAL

## 2018-07-09 VITALS
TEMPERATURE: 99.6 F | DIASTOLIC BLOOD PRESSURE: 96 MMHG | SYSTOLIC BLOOD PRESSURE: 142 MMHG | RESPIRATION RATE: 16 BRPM | OXYGEN SATURATION: 97 %
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
## 2018-07-09 DIAGNOSIS — S20.212A CONTUSION OF LEFT CHEST WALL, INITIAL ENCOUNTER: ICD-10-CM

## 2018-07-09 DIAGNOSIS — T07.XXXA CONTUSION OF MULTIPLE SITES: ICD-10-CM

## 2018-07-09 LAB
ALBUMIN SERPL-MCNC: 4.3 G/DL (ref 3.4–5)
ALP SERPL-CCNC: 71 U/L (ref 40–150)
ALT SERPL W P-5'-P-CCNC: 20 U/L (ref 0–50)
ANION GAP SERPL CALCULATED.3IONS-SCNC: 6 MMOL/L (ref 3–14)
AST SERPL W P-5'-P-CCNC: 14 U/L (ref 0–45)
BASOPHILS # BLD AUTO: 0.1 10E9/L (ref 0–0.2)
BASOPHILS NFR BLD AUTO: 0.6 %
BILIRUB SERPL-MCNC: 0.5 MG/DL (ref 0.2–1.3)
BUN SERPL-MCNC: 12 MG/DL (ref 7–30)
CALCIUM SERPL-MCNC: 9 MG/DL (ref 8.5–10.1)
CHLORIDE SERPL-SCNC: 106 MMOL/L (ref 94–109)
CO2 SERPL-SCNC: 29 MMOL/L (ref 20–32)
CREAT SERPL-MCNC: 0.77 MG/DL (ref 0.52–1.04)
DIFFERENTIAL METHOD BLD: NORMAL
EOSINOPHIL # BLD AUTO: 0.3 10E9/L (ref 0–0.7)
EOSINOPHIL NFR BLD AUTO: 2.7 %
ERYTHROCYTE [DISTWIDTH] IN BLOOD BY AUTOMATED COUNT: 12.8 % (ref 10–15)
GFR SERPL CREATININE-BSD FRML MDRD: 81 ML/MIN/1.7M2
GLUCOSE SERPL-MCNC: 85 MG/DL (ref 70–99)
HCT VFR BLD AUTO: 42.6 % (ref 35–47)
HGB BLD-MCNC: 14.7 G/DL (ref 11.7–15.7)
IMM GRANULOCYTES # BLD: 0 10E9/L (ref 0–0.4)
IMM GRANULOCYTES NFR BLD: 0.3 %
LYMPHOCYTES # BLD AUTO: 3.3 10E9/L (ref 0.8–5.3)
LYMPHOCYTES NFR BLD AUTO: 35.7 %
MCH RBC QN AUTO: 30.4 PG (ref 26.5–33)
MCHC RBC AUTO-ENTMCNC: 34.5 G/DL (ref 31.5–36.5)
MCV RBC AUTO: 88 FL (ref 78–100)
MONOCYTES # BLD AUTO: 0.8 10E9/L (ref 0–1.3)
MONOCYTES NFR BLD AUTO: 8.1 %
NEUTROPHILS # BLD AUTO: 4.9 10E9/L (ref 1.6–8.3)
NEUTROPHILS NFR BLD AUTO: 52.6 %
NRBC # BLD AUTO: 0 10*3/UL
NRBC BLD AUTO-RTO: 0 /100
PLATELET # BLD AUTO: 319 10E9/L (ref 150–450)
POTASSIUM SERPL-SCNC: 3.5 MMOL/L (ref 3.4–5.3)
PROT SERPL-MCNC: 7.8 G/DL (ref 6.8–8.8)
RBC # BLD AUTO: 4.84 10E12/L (ref 3.8–5.2)
SODIUM SERPL-SCNC: 141 MMOL/L (ref 133–144)
WBC # BLD AUTO: 9.3 10E9/L (ref 4–11)

## 2018-07-09 PROCEDURE — 85025 COMPLETE CBC W/AUTO DIFF WBC: CPT | Performed by: FAMILY MEDICINE

## 2018-07-09 PROCEDURE — 90471 IMMUNIZATION ADMIN: CPT

## 2018-07-09 PROCEDURE — 99285 EMERGENCY DEPT VISIT HI MDM: CPT | Mod: Z6 | Performed by: FAMILY MEDICINE

## 2018-07-09 PROCEDURE — 70450 CT HEAD/BRAIN W/O DYE: CPT | Mod: TC

## 2018-07-09 PROCEDURE — 90715 TDAP VACCINE 7 YRS/> IM: CPT | Performed by: FAMILY MEDICINE

## 2018-07-09 PROCEDURE — 80053 COMPREHEN METABOLIC PANEL: CPT | Performed by: FAMILY MEDICINE

## 2018-07-09 PROCEDURE — 99285 EMERGENCY DEPT VISIT HI MDM: CPT | Mod: 25

## 2018-07-09 PROCEDURE — 36415 COLL VENOUS BLD VENIPUNCTURE: CPT | Performed by: FAMILY MEDICINE

## 2018-07-09 PROCEDURE — 25000128 H RX IP 250 OP 636: Performed by: FAMILY MEDICINE

## 2018-07-09 PROCEDURE — 72125 CT NECK SPINE W/O DYE: CPT | Mod: TC

## 2018-07-09 PROCEDURE — 73600 X-RAY EXAM OF ANKLE: CPT | Mod: TC,LT

## 2018-07-09 PROCEDURE — 71260 CT THORAX DX C+: CPT | Mod: TC

## 2018-07-09 RX ORDER — IOPAMIDOL 612 MG/ML
100 INJECTION, SOLUTION INTRAVASCULAR ONCE
Status: COMPLETED | OUTPATIENT
Start: 2018-07-09 | End: 2018-07-09

## 2018-07-09 RX ORDER — ACETAMINOPHEN AND CODEINE PHOSPHATE 300; 30 MG/1; MG/1
1-2 TABLET ORAL EVERY 6 HOURS PRN
Qty: 12 TABLET | Refills: 0 | Status: SHIPPED | OUTPATIENT
Start: 2018-07-09 | End: 2020-11-21

## 2018-07-09 RX ADMIN — CLOSTRIDIUM TETANI TOXOID ANTIGEN (FORMALDEHYDE INACTIVATED), CORYNEBACTERIUM DIPHTHERIAE TOXOID ANTIGEN (FORMALDEHYDE INACTIVATED), BORDETELLA PERTUSSIS TOXOID ANTIGEN (GLUTARALDEHYDE INACTIVATED), BORDETELLA PERTUSSIS FILAMENTOUS HEMAGGLUTININ ANTIGEN (FORMALDEHYDE INACTIVATED), BORDETELLA PERTUSSIS PERTACTIN ANTIGEN, AND BORDETELLA PERTUSSIS FIMBRIAE 2/3 ANTIGEN 0.5 ML: 5; 2; 2.5; 5; 3; 5 INJECTION, SUSPENSION INTRAMUSCULAR at 20:26

## 2018-07-09 RX ADMIN — IOPAMIDOL 100 ML: 612 INJECTION, SOLUTION INTRAVENOUS at 20:03

## 2018-07-09 NOTE — ED AVS SNAPSHOT
[facility]    [address] [place] [address]    [place] [address]    Phone:  [phone]                                       Eugenia Earl   MRN: [id number]    Department:  [facility]   Date of Visit:  7/9/2018           Patient Information     Date Of Birth          1970        Your diagnoses for this visit were:     Fall, initial encounter        You were seen by Faviola Pichardo MD and Andrea Whitley MD.      Follow-up Information     Follow up with Yan Villegas MD.    Specialty:  Family Practice    Why:  If symptoms worsen, As needed    Contact information:    [facility]  [address] [place], [place] [address] [address]  [phone]          Discharge Instructions           Preventing Falls: Making Changes in Your Living Space  Is your living space filled with hazards that could cause you to fall? Changes can make you safer. They could even save your life. Take a careful look around your home. Change what you can on your own. Hire someone or ask friends or family to help with harder tasks.      Be sure to add a nonslip mat to the inside of your shower or bathtub. Always keep a nightlight on. Keep a clear path from your bed to the bathroom. Move items from higher shelves to lower ones.   Remove hazards    Remove things that can trip you, like throw rugs, boxes, piles of paper, or cords.    Nail down rugs or carpeting if you don't want to remove them.    Don't store items on stairs.    Keep walkways clear.    Clean up spills right away.    Replace glass tables with wooden ones. They're safer if you fall.  Add safety devices    Add handrails to both sides of stairs.    Buy a raised toilet seat.    Add grab bars near the toilet and in the shower.    Get grabbers to help you reach things and avoid climbing.  Improve lighting    Add nightlights to halls, bedrooms, and bathrooms.     Put light switches at the top and bottom of stairs.    Be sure each room and flight of  stairs has proper lighting.    Use shades or curtains to cut glare from windows.    Put flashlights in each room. Replace burned-out bulbs.    Get glowing light switches for room entrances.  Take other precautions    Use nonskid floor wax.    Buy a nonslip mat and a liquid soap dispenser for the shower.    Tack down carpets or use slip-resistant backing.    Put most-used items within easy reach.    Add bright paint or tape on the top front edge of steps.    Save big jobs, such as moving furniture or other heavy objects, for family or friends.    Get professional help installing grab bars. They can be unsafe if not installed the right way.  Fix riskier rooms first  Don't tackle everything at once. Focus on one room at a time. The bathroom is a common spot for falls, so you may start there. Or start with a room you spend lots of time in, such as your bedroom. Make only a few changes at once. This will give you time to adjust to them.     Outside your home  You might arrange for these changes yourself, or you might need to talk to your  or homeowners' association about them.    Have loose boards on porches or damaged stairs repaired.    Have rough edges, holes, or large cracks in sidewalks or driveways repaired.    Have hazards that could trip you, such as hoses or uriel, removed.    Use high-wattage light bulbs (100 or greater) near outside doors and stairs.    Get handrails added to outside stairs. Have them extend beyond the bottom step.    Get help in winter weather with ice or snow removal.   Date Last Reviewed: 5/1/2017 2000-2017 [employer]. [address] [place] [address]. All rights reserved. This information is not intended as a substitute for professional medical care. Always follow your healthcare professional's instructions.      What to expect when you have contrast    During your exam, we will inject  contrast  into your vein or artery. (Contrast is a clear  liquid with iodine in it. It shows up on X-rays.)    You may feel warm or hot. You may have a metal taste in your mouth and a slight upset stomach. You may also feel pressure near the kidneys and bladder. These effects will last about 1 to 3 minutes.    Please tell us if you have:    Sneezing     Itching    Hives     Swelling in the face    A hoarse voice    Breathing problems    Other new symptoms    Serious problems are rare.  They may include:    Irregular heartbeat     Seizures    Kidney failure              Tissue damage    Shock      Death    If you have any problems during the exam, we  will treat them right away.    When you get home    Call your hospital if you have any new symptoms in the next 2 days, like hives or swelling. (Phone numbers are at the bottom of this page.) Or call your family doctor.     If you have wheezing or trouble breathing, call 911.    Self-care  -Drink at least 4 extra glasses of water today.   This reduces the stress on your kidneys.  -Keep taking your regular medicines.    The contrast will pass out of your body in your  Urine(pee). This will happen in the next 24 hours. You  will not feel this. Your urine will not  change color.    If you have kidney problems or take metformin    Drink 4 to 8 large glasses of water for the next  2 days, if you are not on a fluid restriction.    ?If you take metformin (Glucophage or Glucovance) for diabetes, keep taking it.      ?Your kidney function tests are abnormal.  If you take Metformin, do not take it for 48 hours. Please go to your clinic for a blood test within 3 days after your exam before the restarting this medicine.     (Note to provider:please give patient prescription for lab tests.)    ?Special instructions:     I have read and understand the above information.    Patient Sign Here:______________________________________Date:________Time:______    Staff Sign  Here:________________________________________Date:_______Time:______      Radiology Departments:     ?[facility]: [phone] ?[facility]: [phone]     ?[facility]: [phone] ?[facility]:[phone]      ?[facility]: [phone]  ?[facility]: [phone]  ?[place]:[phone]    ?[facility]:[phone]  ?[facility]:[phone]       Review of your medicines      START taking        Dose / Directions Last dose taken    acetaminophen-codeine 300-30 MG per tablet   Commonly known as:  TYLENOL WITH CODEINE #3   Dose:  1-2 tablet   Quantity:  12 tablet        Take 1-2 tablets by mouth every 6 hours as needed for pain   Refills:  0                Information about OPIOIDS     PRESCRIPTION OPIOIDS: WHAT YOU NEED TO KNOW   We gave you an opioid (narcotic) pain medicine. It is important to manage your pain, but opioids are not always the best choice. You should first try all the other options your care team gave you. Take this medicine for as short a time (and as few doses) as possible.     These medicines have risks:    DO NOT drive when on new or higher doses of pain medicine. These medicines can affect your alertness and reaction times, and you could be arrested for driving under the influence (DUI). If you need to use opioids long-term, talk to your care team about driving.    DO NOT operate heave machinery    DO NOT do any other dangerous activities while taking these medicines.     DO NOT drink any alcohol while taking these medicines.      If the opioid prescribed includes acetaminophen, DO NOT take with any other medicines that contain acetaminophen. Read all labels carefully. Look for the word  acetaminophen  or  Tylenol.  Ask your pharmacist if you have questions or are unsure.    You can get addicted to pain medicines, especially if you have a history of addiction (chemical, alcohol or substance dependence). Talk to your care team about ways to reduce this risk.    Store your pills in a secure place,  locked if possible. We will not replace any lost or stolen medicine. If you don t finish your medicine, please throw away (dispose) as directed by your pharmacist. The [place] [employer]ution [employer] has more information about safe disposal: https://www.pca.[facility].[place]/living-green/managing-unwanted-medications.     All opioids tend to cause constipation. Drink plenty of water and eat foods that have a lot of fiber, such as fruits, vegetables, prune juice, apple juice and high-fiber cereal. Take a laxative (Miralax, milk of magnesia, Colace, Senna) if you don t move your bowels at least every other day.         Prescriptions were sent or printed at these locations (1 Prescription)                   [id number] - [place] - [address]   [address] [place] [address]    Telephone:  [phone]   Fax:  [phone]   Hours:                  Printed at Department/Unit printer (1 of 1)         acetaminophen-codeine (TYLENOL WITH CODEINE #3) 300-30 MG per tablet                Procedures and tests performed during your visit     CBC with platelets differential    CT Cervical Spine w/o Contrast    CT Chest/Abdomen/Pelvis w Contrast    CT Head w/o Contrast    Comprehensive metabolic panel    [place] Coma Scale (GCS) assessment    Peripheral IV: Standard    Pulse oximetry nursing    XR Ankle Left 2 Views      Orders Needing Specimen Collection     None      Pending Results     No orders found from 7/7/2018 to 7/10/2018.            Pending Culture Results     No orders found from 7/7/2018 to 7/10/2018.            Thank you for choosing [facility]       Thank you for choosing [facility] for your care. Our goal is always to provide you with excellent care. Hearing back from our patients is one way we can continue to improve our services. Please take a few minutes to complete the written survey that you may receive in the mail after you visit with us. Thank you!        MyChart Information     [facility]  gives you secure access to your electronic health record. If you see a primary care provider, you can also send messages to your care team and make appointments. If you have questions, please call your primary care clinic.  If you do not have a primary care provider, please call [phone] and they will assist you.        Care EveryWhere ID     This is your Care EveryWhere ID. This could be used by other organizations to access your [facility] medical records  [id number]        Equal Access to Services     MARQUEZ TRACEY : Hadii elis lutzo Soelham, waaxda luleighannadaha, qaybta kaalmada jacklyn, aditi parra . [facility] [phone].    ATENCIÓN: Si habla español, tiene a mendoza disposición servicios gratuitos de asistencia lingüística. Vickeyame al [phone].    We comply with applicable federal civil rights laws and [place] laws. We do not discriminate on the basis of race, color, national origin, age, disability, sex, sexual orientation, or gender identity.            After Visit Summary       This is your record. Keep this with you and show to your community pharmacist(s) and doctor(s) at your next visit.

## 2018-07-10 NOTE — ED NOTES
The patient presents with report of posterior headache, posterior neck pain, left sided posterior rib pain, left elbow pain and left buttock pain following a fall at 0230 when her cat went between her legs and she fell backwards hitting her head. States nausea and some dizziness, denies vomiting. C-collar applied in triage, CMS intact to the arms bilaterally. Ambulatory on arrival and denies numbness or tingling. No contusions or abrasions noted.

## 2018-07-10 NOTE — DISCHARGE INSTRUCTIONS
Preventing Falls: Making Changes in Your Living Space  Is your living space filled with hazards that could cause you to fall? Changes can make you safer. They could even save your life. Take a careful look around your home. Change what you can on your own. Hire someone or ask friends or family to help with harder tasks.      Be sure to add a nonslip mat to the inside of your shower or bathtub. Always keep a nightlight on. Keep a clear path from your bed to the bathroom. Move items from higher shelves to lower ones.   Remove hazards    Remove things that can trip you, like throw rugs, boxes, piles of paper, or cords.    Nail down rugs or carpeting if you don't want to remove them.    Don't store items on stairs.    Keep walkways clear.    Clean up spills right away.    Replace glass tables with wooden ones. They're safer if you fall.  Add safety devices    Add handrails to both sides of stairs.    Buy a raised toilet seat.    Add grab bars near the toilet and in the shower.    Get grabbers to help you reach things and avoid climbing.  Improve lighting    Add nightlights to halls, bedrooms, and bathrooms.     Put light switches at the top and bottom of stairs.    Be sure each room and flight of stairs has proper lighting.    Use shades or curtains to cut glare from windows.    Put flashlights in each room. Replace burned-out bulbs.    Get glowing light switches for room entrances.  Take other precautions    Use nonskid floor wax.    Buy a nonslip mat and a liquid soap dispenser for the shower.    Tack down carpets or use slip-resistant backing.    Put most-used items within easy reach.    Add bright paint or tape on the top front edge of steps.    Save big jobs, such as moving furniture or other heavy objects, for family or friends.    Get professional help installing grab bars. They can be unsafe if not installed the right way.  Fix riskier rooms first  Don't tackle everything at once. Focus on one room at a  time. The bathroom is a common spot for falls, so you may start there. Or start with a room you spend lots of time in, such as your bedroom. Make only a few changes at once. This will give you time to adjust to them.     Outside your home  You might arrange for these changes yourself, or you might need to talk to your  or homeowners' association about them.    Have loose boards on porches or damaged stairs repaired.    Have rough edges, holes, or large cracks in sidewalks or driveways repaired.    Have hazards that could trip you, such as hoses or uriel, removed.    Use high-wattage light bulbs (100 or greater) near outside doors and stairs.    Get handrails added to outside stairs. Have them extend beyond the bottom step.    Get help in winter weather with ice or snow removal.   Date Last Reviewed: 5/1/2017 2000-2017 [employer]. [address] [place] [address]. All rights reserved. This information is not intended as a substitute for professional medical care. Always follow your healthcare professional's instructions.      What to expect when you have contrast    During your exam, we will inject  contrast  into your vein or artery. (Contrast is a clear liquid with iodine in it. It shows up on X-rays.)    You may feel warm or hot. You may have a metal taste in your mouth and a slight upset stomach. You may also feel pressure near the kidneys and bladder. These effects will last about 1 to 3 minutes.    Please tell us if you have:    Sneezing     Itching    Hives     Swelling in the face    A hoarse voice    Breathing problems    Other new symptoms    Serious problems are rare.  They may include:    Irregular heartbeat     Seizures    Kidney failure              Tissue damage    Shock      Death    If you have any problems during the exam, we  will treat them right away.    When you get home    Call your hospital if you have any new symptoms in the next 2 days, like hives  or swelling. (Phone numbers are at the bottom of this page.) Or call your family doctor.     If you have wheezing or trouble breathing, call 911.    Self-care  -Drink at least 4 extra glasses of water today.   This reduces the stress on your kidneys.  -Keep taking your regular medicines.    The contrast will pass out of your body in your  Urine(pee). This will happen in the next 24 hours. You  will not feel this. Your urine will not  change color.    If you have kidney problems or take metformin    Drink 4 to 8 large glasses of water for the next  2 days, if you are not on a fluid restriction.    ?If you take metformin (Glucophage or Glucovance) for diabetes, keep taking it.      ?Your kidney function tests are abnormal.  If you take Metformin, do not take it for 48 hours. Please go to your clinic for a blood test within 3 days after your exam before the restarting this medicine.     (Note to provider:please give patient prescription for lab tests.)    ?Special instructions:     I have read and understand the above information.    Patient Sign Here:______________________________________Date:________Time:______    Staff Sign Here:________________________________________Date:_______Time:______      Radiology Departments:     ?[facility]: [phone] ?[facility]: [phone]     ?[facility]: [phone] ?[facility]:[phone]      ?[facility]: [phone]  ?[facility]: [phone]  ?[facility]:[phone]    ?[facility]:[phone]  ?[facility]:[phone]

## 2018-07-10 NOTE — ED PROVIDER NOTES
Patient handed over from Dr. Edgar at shift change at 8 PM, please see her notes  Diagnosis:  Fall, initial encounter  Patient evaluated in the emergency department after falling down a flight of stairs.  Fell backwards and hit the back of the head on hard surface.  Laboratory test done in the emergency department were all normal.  CT scan of the head, cervical spine, chest abdomen and pelvis were all normal.  Normal x-ray of the left ankle.  Now patient is being discharged home on Tylenol 3 as needed for pain.  Follow-up with PCP if not better.     Andrea Whitley MD  07/09/18 9416

## 2018-07-10 NOTE — ED NOTES
C-spine cleared by Dr. Whitley and c-collar removed. CMS intact after removal. Patient preparing for discharge. Denies nausea or dizziness at this time. States left sided body soreness.

## 2018-07-12 ASSESSMENT — ENCOUNTER SYMPTOMS
FEVER: 0
HEADACHES: 1
CONSTIPATION: 0
ACTIVITY CHANGE: 1
DYSURIA: 0
DIAPHORESIS: 0
NAUSEA: 0
DYSPHORIC MOOD: 1
FATIGUE: 0
NECK PAIN: 1
ABDOMINAL PAIN: 0
SINUS PRESSURE: 0
WHEEZING: 0
SHORTNESS OF BREATH: 0
DIARRHEA: 0
ARTHRALGIAS: 1
WEAKNESS: 0
BACK PAIN: 1
NERVOUS/ANXIOUS: 1
COUGH: 0
NUMBNESS: 0
MYALGIAS: 1
VOMITING: 0

## 2018-07-13 ENCOUNTER — DOCUMENTATION ONLY (OUTPATIENT)
Dept: TRANSPLANT | Facility: CLINIC | Age: 48
End: 2018-07-13

## 2018-07-13 NOTE — ED PROVIDER NOTES
"  History     Chief Complaint   Patient presents with     Fall     c/o neck pain, lt side of body pain, h/a and nausea. notes \"it feels like my brain is swollen\". states her cat tripped her last night and she fell backward onto carpeted steps. unknown loss of consciousness. stif neck collar applied in triage     HPI  Eugenia Earl is a 48 year old female who tripped on her cat and fell down her carpeted stairs.  She fell backward, does not know if she lost consciousness.  Today she has pain on the left side of her body, has neck pain, headache and nausea. She notes a pressure sensation in her head that she has not had previously.  She has pain in the left chest, lumbar and thoracic spine.  She further notes that her left ankle is swollen and painful on ambulation.    Problem List:    Patient Active Problem List    Diagnosis Date Noted     Post-operative state 11/10/2017     Priority: Medium     Status post laparoscopic assisted vaginal hysterectomy 09/28/2017     Priority: Medium     Surgery, elective 09/27/2017     Priority: Medium     Compression of lumbar nerve root 02/28/2017     Priority: Medium     Endometriosis of uterus 05/10/2001     Priority: Medium     Dysmenorrhea 04/16/2001     Priority: Medium        Past Medical History:    Past Medical History:   Diagnosis Date     Dysmenorrhea 04/16/2001     Endometriosis of uterus 05/10/2001     Endometriosis, site unspecified 06/18/2001     Follow-up examination, following other surgery 10/01/2008     Follow-up examination, following unspecified surgery 04/19/2001     Nonallopathic lesion of cervical region, not elsewhere classified 12/19/2001       Past Surgical History:    Past Surgical History:   Procedure Laterality Date     LAMINECTOMY LUMBAR POSTERIOR MICROSCOPIC ONE LEVEL N/A 9/30/2015    Procedure: LAMINECTOMY LUMBAR POSTERIOR MICROSCOPIC ONE LEVEL;  Surgeon: Danyel Kaba MD;  Location: [facility]     LAPAROSCOPIC ASSISTED HYSTERECTOMY VAGINAL, " BILATERAL SALPINGO-OOPHORECTOMY, COMBINED Left 9/27/2017    Procedure: COMBINED LAPAROSCOPIC ASSISTED HYSTERECTOMY VAGINAL, SALPINGO-OOPHORECTOMY;  LAPAROSCOPIC ASSISTED VAGINAL HYSTERECTOMY, BILATERAL SALPINGECTOMY, LEFT OOPHORECTOMY, Cystoscopy;  Surgeon: Chapo Szymanski MD;  Location: [facility]     TONSILLECTOMY       TUBAL LIGATION         Family History:    Family History   Problem Relation Age of Onset     Diabetes Mother      Coronary Artery Disease Father      Hyperlipidemia Father        Social History:  Marital Status:  Single [1]  Social History   Substance Use Topics     Smoking status: Former Smoker     Smokeless tobacco: Never Used     Alcohol use Yes      Comment: occasionally        Medications:      acetaminophen-codeine (TYLENOL WITH CODEINE #3) 300-30 MG per tablet         Review of Systems   Constitutional: Positive for activity change. Negative for diaphoresis, fatigue and fever.   HENT: Negative for ear discharge, nosebleeds and sinus pressure.    Respiratory: Negative for cough, shortness of breath and wheezing.    Cardiovascular: Positive for chest pain.        Left ribs   Gastrointestinal: Negative for abdominal pain, constipation, diarrhea, nausea and vomiting.   Genitourinary: Negative for dysuria.   Musculoskeletal: Positive for arthralgias, back pain, gait problem, myalgias and neck pain.   Skin: Negative.    Neurological: Positive for headaches. Negative for syncope, weakness and numbness.   Psychiatric/Behavioral: Positive for dysphoric mood. The patient is nervous/anxious.        Physical Exam   BP: 161/87  Heart Rate: 78  Temp: 99.6  F (37.6  C)  Resp: 16  SpO2: 98 %      Physical Exam   Constitutional: She is oriented to person, place, and time. She appears well-developed and well-nourished. She appears distressed.   HENT:   Head: Normocephalic. Head is without raccoon's eyes and without Menjivar's sign.   Right Ear: No hemotympanum.   Left Ear: No hemotympanum.   Nose: No nasal septal  hematoma.   Mouth/Throat: Oropharynx is clear and moist. Normal dentition.   Neck: Normal range of motion. Neck supple.   Cardiovascular: Normal rate, regular rhythm, normal heart sounds and intact distal pulses.    No murmur heard.  Pulmonary/Chest: Effort normal and breath sounds normal. No respiratory distress. She has no wheezes. She exhibits tenderness.   posterolateral left ribs 5-6-7   Abdominal: Soft. Bowel sounds are normal. She exhibits no distension. There is no tenderness.   Musculoskeletal: She exhibits edema and tenderness. She exhibits no deformity.        Left ankle: She exhibits decreased range of motion and swelling. She exhibits no ecchymosis and no deformity. Tenderness. Posterior TFL tenderness found.        Cervical back: She exhibits decreased range of motion, tenderness and bony tenderness. She exhibits no edema and no deformity.        Thoracic back: She exhibits tenderness and bony tenderness.        Lumbar back: She exhibits decreased range of motion, tenderness, bony tenderness and spasm.   Neurological: She is alert and oriented to person, place, and time.   Skin: Skin is warm and dry.   Psychiatric: She has a normal mood and affect.   Nursing note and vitals reviewed.      ED Course     ED Course     Procedures       CBC with platelets differential (Final result) Component (Lab Inquiry)       Collection Time Result Time WBC RBC HGB HCT MCV     07/09/18 19:30:00 07/09/18 19:38:10 9.3 4.84 14.7 42.6 88          Collection Time Result Time MCH MCHC RDW PLT DIFF METHO     07/09/18 19:30:00 07/09/18 19:38:10 30.4 34.5 12.8 319 Automated Method          Collection Time Result Time % Neutrophils % Lymphocytes % Monocytes % Eosinophils % Basophils     07/09/18 19:30:00 07/09/18 19:38:10 52.6 35.7 8.1 2.7 0.6          Collection Time Result Time % Immature Granulocytes Nucleated RBCs Absolute Neutrophil Absolute Lymphocytes Absolute Monocytes     07/09/18 19:30:00 07/09/18 19:38:10 0.3 0 4.9 3.3  0.8          Collection Time Result Time Absolute Eosinophils Absolute Basophils Abs Immature Granulocytes Absolute Nucleated RBC     07/09/18 19:30:00 07/09/18 19:38:10 0.3 0.1 0.0 0.0                      Comprehensive metabolic panel (Final result) Component (Lab Inquiry)       Collection Time Result Time NA POTASSIUM Chloride Carbon Dioxide ANION GAP     07/09/18 19:30:00 07/09/18 19:55:12 141 3.5 106 29 6          Collection Time Result Time GLUCOSE BUN CREATININE GFR Estimate GFR Estimate If Black     07/09/18 19:30:00 07/09/18 19:55:12 85 12 0.77 81  Non  GFR Calc >90   GFR Calc          Collection Time Result Time Calcium Bilirubin Total ALBUMIN Protein Total ALKPHOS     07/09/18 19:30:00 07/09/18 19:55:12 9.0 0.5 4.3 7.8 71          Collection Time Result Time ALT AST     07/09/18 19:30:00 07/09/18 19:55:12 20 14                   Imaging Results           CT Chest/Abdomen/Pelvis w Contrast (Final result) Result time: 07/09/18 20:45:47     Final result by Jv Garcia MD (07/09/18 20:45:47)     Impression:     IMPRESSION:   1. No vertebral fractures are noted.  2. No  rib fracture is seen. No pneumothorax or pleural effusion is  noted.    JV GARCIA MD     Narrative:     PROCEDURE: CT CHEST/ABDOMEN/PELVIS W CONTRAST 7/9/2018 8:33 PM    HISTORY: fall down 2 stairs, pain in left chest, t-spine, l-spine;     COMPARISONS: None.    Meds/Dose Given: ISOVUE 300 100ML    TECHNIQUE: CT scan of the chest with IV contrast. Sagittal coronal  reconstructions were obtained. CT scan of the abdomen and pelvis with  IV contrast. Sagittal and coronal reconstructions were obtained.    FINDINGS: CT chest: There is dependent atelectasis seen at the lung  bases. The lungs are otherwise clear. No pleural effusion or  pneumothorax is seen. The mediastinum appears normal there is no hilar  or mediastinal masses lymphadenopathy heart size is normal. Regional  skeleton is intact.    CT  scan of the abdomen and pelvis: The liver is free of masses. The  spleen and pancreas appear normal. No calcified gallstones or biliary  ductal enlargement is seen. There are no adrenal masses. The right  left kidneys show no evidence of masses or hydronephrosis. The  periaortic lymph nodes are normal in caliber. No free air or free  fluid is seen in the abdomen. The bladder and rectum appear normal.  There is no pelvic fracture. Both proximal femurs appear normal. There  are degenerative changes at L4-L5. No lumbar vertebral fractures are  seen.                    CT Cervical Spine w/o Contrast (Final result) Result time: 07/09/18 20:39:54     Final result by Jv Garcia MD (07/09/18 20:39:54)     Impression:     IMPRESSION: Moderate degenerative changes in cervical spine no  fracture is seen.    JV GARCIA MD     Narrative:     PROCEDURE: CT CERVICAL SPINE W/O CONTRAST 7/9/2018 8:31 PM    HISTORY: fall down 2 stairs, pain;     COMPARISONS: None.    Meds/Dose Given:    TECHNIQUE: CT scan of the cervical spine with sagittal coronal  reconstructions    FINDINGS: There is decrease in height in the C5-C6 and C6-C7 discs  with mild anterior posterior osteophytes. There are no fractures of  the vertebral bodies or arches. Cervical facet joints appear normal.  The prevertebral soft tissues appear normal.                    CT Head w/o Contrast (Final result) Result time: 07/09/18 20:37:50     Final result by Jv Garcia MD (07/09/18 20:37:50)     Impression:     IMPRESSION: Normal brain      JV GARCIA MD     Narrative:     PROCEDURE: CT HEAD W/O CONTRAST     HISTORY: fall down 2 stairs, pain; .    COMPARISON: None.    TECHNIQUE:  Helical images of the head from the foramen magnum to the  vertex were obtained without contrast.    FINDINGS: The ventricles and sulci are normal in volume. No acute  intracranial hemorrhage, mass effect, midline shift, hydrocephalus or  basilar cystern effacement  are present.    The grey-white matter interface is preserved.    The calvarium is intact. The mastoid air cells are clear.  The  visualized paranasal sinuses are clear.                 XR Ankle Left 2 Views (Final result) Result time: 07/09/18 20:23:17     Final result by Jv Garcia MD (07/09/18 20:23:17)     Impression:     IMPRESSION: No acute fracture.      JV GARCIA MD     Narrative:     PROCEDURE:  XR ANKLE LT 2 VW    HISTORY: fall down 2 stairs, pain;     COMPARISON:  None.    TECHNIQUE:  2 views of the left ankle were obtained.    FINDINGS:  No fracture or dislocation is identified. The joint spaces  are preserved.  There is a small spur at the insertion of the plantar  fascia into the calcaneus         Medications   Tdap (tetanus-diphtheria-acell pertussis) (ADACEL) injection 0.5 mL (0.5 mLs Intramuscular Given 7/9/18 2026)   iopamidol (ISOVUE-300) IV solution 61% 100 mL (100 mLs Intravenous Given 7/9/18 2003)   sodium chloride (PF) 0.9% PF flush 60 mL (60 mLs Intravenous Given 7/9/18 2003)       Assessments & Plan (with Medical Decision Making)   Patient has multiple contusions, but no fractures.  C-collar removed after CT and clinical evaluation, FROM with tenderness at extremes of rotation.  Patient advised to take ibuprofen 800mg TID and use Tylenol #3 for severe pain and aid in sleeping only. Follow up with primary care next week as needed.  Advised to ice everything that hurts.    I have reviewed the nursing notes.    I have reviewed the findings, diagnosis, plan and need for follow up with the patient.  Discharge Medication List as of 7/9/2018  8:54 PM      START taking these medications    Details   acetaminophen-codeine (TYLENOL WITH CODEINE #3) 300-30 MG per tablet Take 1-2 tablets by mouth every 6 hours as needed for pain, Disp-12 tablet, R-0, Local Print             Final diagnoses:   Fall, initial encounter   Contusion of left chest wall, initial encounter   Contusion of multiple  [facility]       7/9/2018   [facility] DEPARTMENT     Faviola Pichardo MD  07/12/18 5050

## 2018-07-17 ENCOUNTER — TELEPHONE (OUTPATIENT)
Dept: TRANSPLANT | Facility: CLINIC | Age: 48
End: 2018-07-17

## 2018-08-29 ENCOUNTER — TELEPHONE (OUTPATIENT)
Dept: TRANSPLANT | Facility: CLINIC | Age: 48
End: 2018-08-29

## 2018-08-29 DIAGNOSIS — Z00.5 TRANSPLANT DONOR EVALUATION: ICD-10-CM

## 2018-09-13 ENCOUNTER — RADIANT APPOINTMENT (OUTPATIENT)
Dept: CARDIOLOGY | Facility: CLINIC | Age: 48
End: 2018-09-13
Attending: INTERNAL MEDICINE

## 2018-09-13 ENCOUNTER — APPOINTMENT (OUTPATIENT)
Dept: TRANSPLANT | Facility: CLINIC | Age: 48
End: 2018-09-13
Attending: SURGERY

## 2018-09-13 ENCOUNTER — INFUSION THERAPY VISIT (OUTPATIENT)
Dept: INFUSION THERAPY | Facility: CLINIC | Age: 48
End: 2018-09-13
Attending: INTERNAL MEDICINE

## 2018-09-13 ENCOUNTER — OFFICE VISIT (OUTPATIENT)
Dept: NEPHROLOGY | Facility: CLINIC | Age: 48
End: 2018-09-13
Attending: INTERNAL MEDICINE

## 2018-09-13 ENCOUNTER — RADIANT APPOINTMENT (OUTPATIENT)
Dept: CT IMAGING | Facility: CLINIC | Age: 48
End: 2018-09-13
Attending: INTERNAL MEDICINE

## 2018-09-13 ENCOUNTER — ALLIED HEALTH/NURSE VISIT (OUTPATIENT)
Dept: TRANSPLANT | Facility: CLINIC | Age: 48
End: 2018-09-13
Attending: SURGERY

## 2018-09-13 ENCOUNTER — APPOINTMENT (OUTPATIENT)
Dept: GENERAL RADIOLOGY | Facility: CLINIC | Age: 48
End: 2018-09-13

## 2018-09-13 ENCOUNTER — OFFICE VISIT (OUTPATIENT)
Dept: TRANSPLANT | Facility: CLINIC | Age: 48
End: 2018-09-13
Attending: SURGERY

## 2018-09-13 ENCOUNTER — RADIANT APPOINTMENT (OUTPATIENT)
Dept: GENERAL RADIOLOGY | Facility: CLINIC | Age: 48
End: 2018-09-13
Attending: INTERNAL MEDICINE

## 2018-09-13 VITALS
HEIGHT: 61 IN | SYSTOLIC BLOOD PRESSURE: 136 MMHG | WEIGHT: 110 LBS | BODY MASS INDEX: 20.77 KG/M2 | DIASTOLIC BLOOD PRESSURE: 85 MMHG | HEART RATE: 69 BPM | OXYGEN SATURATION: 100 % | TEMPERATURE: 97.5 F

## 2018-09-13 DIAGNOSIS — Z00.5 TRANSPLANT DONOR EVALUATION: ICD-10-CM

## 2018-09-13 DIAGNOSIS — Z52.4 KIDNEY DONOR: Primary | ICD-10-CM

## 2018-09-13 DIAGNOSIS — R01.1 HEART MURMUR: ICD-10-CM

## 2018-09-13 DIAGNOSIS — Z00.5 TRANSPLANT DONOR EVALUATION: Primary | ICD-10-CM

## 2018-09-13 DIAGNOSIS — R01.1 HEART MURMUR: Primary | ICD-10-CM

## 2018-09-13 LAB
ABO + RH BLD: NORMAL
ABO + RH BLD: NORMAL
ALBUMIN SERPL-MCNC: 4.3 G/DL (ref 3.4–5)
ALBUMIN UR-MCNC: NEGATIVE MG/DL
ALP SERPL-CCNC: 65 U/L (ref 40–150)
ALT SERPL W P-5'-P-CCNC: 24 U/L (ref 0–50)
ANION GAP SERPL CALCULATED.3IONS-SCNC: 4 MMOL/L (ref 3–14)
APPEARANCE UR: CLEAR
APTT PPP: 34 SEC (ref 22–37)
AST SERPL W P-5'-P-CCNC: 18 U/L (ref 0–45)
BACTERIA #/AREA URNS HPF: ABNORMAL /HPF
BILIRUB SERPL-MCNC: 0.6 MG/DL (ref 0.2–1.3)
BILIRUB UR QL STRIP: NEGATIVE
BLD GP AB SCN SERPL QL: NORMAL
BLOOD BANK CMNT PATIENT-IMP: NORMAL
BUN SERPL-MCNC: 12 MG/DL (ref 7–30)
CALCIUM SERPL-MCNC: 9.1 MG/DL (ref 8.5–10.1)
CHLORIDE SERPL-SCNC: 104 MMOL/L (ref 94–109)
CHOLEST SERPL-MCNC: 261 MG/DL
CMV IGG SERPL QL IA: >8 AI (ref 0–0.8)
CO2 SERPL-SCNC: 30 MMOL/L (ref 20–32)
COLOR UR AUTO: ABNORMAL
CREAT SERPL-MCNC: 0.84 MG/DL (ref 0.52–1.04)
CREAT UR-MCNC: 40 MG/DL
EBV VCA IGG SER QL IA: 6.3 AI (ref 0–0.8)
EBV VCA IGM SER QL IA: <0.2 AI (ref 0–0.8)
ERYTHROCYTE [DISTWIDTH] IN BLOOD BY AUTOMATED COUNT: 12.1 % (ref 10–15)
GFR SERPL CREATININE-BSD FRML MDRD: 73 ML/MIN/1.7M2
GLUCOSE SERPL-MCNC: 102 MG/DL (ref 70–99)
GLUCOSE UR STRIP-MCNC: NEGATIVE MG/DL
HBA1C MFR BLD: 5.5 % (ref 0–5.6)
HBV CORE AB SERPL QL IA: NONREACTIVE
HBV SURFACE AB SERPL IA-ACNC: 0.29 M[IU]/ML
HBV SURFACE AG SERPL QL IA: NONREACTIVE
HCT VFR BLD AUTO: 47.3 % (ref 35–47)
HCV AB SERPL QL IA: NONREACTIVE
HDLC SERPL-MCNC: 62 MG/DL
HGB BLD-MCNC: 15.3 G/DL (ref 11.7–15.7)
HGB UR QL STRIP: NEGATIVE
HIV 1+2 AB+HIV1 P24 AG SERPL QL IA: NONREACTIVE
INR PPP: 0.95 (ref 0.86–1.14)
KETONES UR STRIP-MCNC: NEGATIVE MG/DL
LDLC SERPL CALC-MCNC: 180 MG/DL
LEUKOCYTE ESTERASE UR QL STRIP: NEGATIVE
MCH RBC QN AUTO: 29.5 PG (ref 26.5–33)
MCHC RBC AUTO-ENTMCNC: 32.3 G/DL (ref 31.5–36.5)
MCV RBC AUTO: 91 FL (ref 78–100)
MICROALBUMIN UR-MCNC: <5 MG/L
MICROALBUMIN/CREAT UR: NORMAL MG/G CR (ref 0–25)
MUCOUS THREADS #/AREA URNS LPF: PRESENT /LPF
NITRATE UR QL: NEGATIVE
NONHDLC SERPL-MCNC: 199 MG/DL
PH UR STRIP: 7 PH (ref 5–7)
PHOSPHATE SERPL-MCNC: 3.6 MG/DL (ref 2.5–4.5)
PLATELET # BLD AUTO: 301 10E9/L (ref 150–450)
POTASSIUM SERPL-SCNC: 4 MMOL/L (ref 3.4–5.3)
PROT SERPL-MCNC: 7.8 G/DL (ref 6.8–8.8)
PROT UR-MCNC: <0.05 G/L
PROT/CREAT 24H UR: NORMAL G/G CR (ref 0–0.2)
RBC # BLD AUTO: 5.19 10E12/L (ref 3.8–5.2)
RBC #/AREA URNS AUTO: 1 /HPF (ref 0–2)
SODIUM SERPL-SCNC: 138 MMOL/L (ref 133–144)
SOURCE: ABNORMAL
SP GR UR STRIP: 1.01 (ref 1–1.03)
SPECIMEN EXP DATE BLD: NORMAL
SQUAMOUS #/AREA URNS AUTO: 1 /HPF (ref 0–1)
T PALLIDUM AB SER QL: NONREACTIVE
TRIGL SERPL-MCNC: 98 MG/DL
URATE SERPL-MCNC: 3.1 MG/DL (ref 2.6–6)
UROBILINOGEN UR STRIP-MCNC: 0 MG/DL (ref 0–2)
WBC # BLD AUTO: 6.5 10E9/L (ref 4–11)
WBC #/AREA URNS AUTO: 1 /HPF (ref 0–5)

## 2018-09-13 PROCEDURE — 86900 BLOOD TYPING SEROLOGIC ABO: CPT | Performed by: INTERNAL MEDICINE

## 2018-09-13 PROCEDURE — 86780 TREPONEMA PALLIDUM: CPT | Performed by: INTERNAL MEDICINE

## 2018-09-13 PROCEDURE — 86803 HEPATITIS C AB TEST: CPT | Performed by: INTERNAL MEDICINE

## 2018-09-13 PROCEDURE — 85610 PROTHROMBIN TIME: CPT | Performed by: INTERNAL MEDICINE

## 2018-09-13 PROCEDURE — 82542 COL CHROMOTOGRAPHY QUAL/QUAN: CPT | Performed by: INTERNAL MEDICINE

## 2018-09-13 PROCEDURE — G0463 HOSPITAL OUTPT CLINIC VISIT: HCPCS | Mod: ZF

## 2018-09-13 PROCEDURE — 85027 COMPLETE CBC AUTOMATED: CPT | Performed by: INTERNAL MEDICINE

## 2018-09-13 PROCEDURE — 25000128 H RX IP 250 OP 636: Mod: JW,ZF | Performed by: INTERNAL MEDICINE

## 2018-09-13 PROCEDURE — 80061 LIPID PANEL: CPT | Performed by: INTERNAL MEDICINE

## 2018-09-13 PROCEDURE — 83036 HEMOGLOBIN GLYCOSYLATED A1C: CPT | Performed by: INTERNAL MEDICINE

## 2018-09-13 PROCEDURE — 84100 ASSAY OF PHOSPHORUS: CPT | Performed by: INTERNAL MEDICINE

## 2018-09-13 PROCEDURE — 84156 ASSAY OF PROTEIN URINE: CPT | Performed by: TRANSPLANT SURGERY

## 2018-09-13 PROCEDURE — 86704 HEP B CORE ANTIBODY TOTAL: CPT | Performed by: INTERNAL MEDICINE

## 2018-09-13 PROCEDURE — 80349 CANNABINOIDS NATURAL: CPT | Performed by: TRANSPLANT SURGERY

## 2018-09-13 PROCEDURE — 81001 URINALYSIS AUTO W/SCOPE: CPT | Performed by: TRANSPLANT SURGERY

## 2018-09-13 PROCEDURE — 86480 TB TEST CELL IMMUN MEASURE: CPT | Performed by: INTERNAL MEDICINE

## 2018-09-13 PROCEDURE — 40000866 ZZHCL STATISTIC HIV 1/2 ANTIGEN/ANTIBODY PRETRANSPLANT ONLY: Performed by: INTERNAL MEDICINE

## 2018-09-13 PROCEDURE — 86901 BLOOD TYPING SEROLOGIC RH(D): CPT | Performed by: INTERNAL MEDICINE

## 2018-09-13 PROCEDURE — 86665 EPSTEIN-BARR CAPSID VCA: CPT | Performed by: INTERNAL MEDICINE

## 2018-09-13 PROCEDURE — 80053 COMPREHEN METABOLIC PANEL: CPT | Performed by: INTERNAL MEDICINE

## 2018-09-13 PROCEDURE — 87340 HEPATITIS B SURFACE AG IA: CPT | Performed by: INTERNAL MEDICINE

## 2018-09-13 PROCEDURE — 86706 HEP B SURFACE ANTIBODY: CPT | Performed by: INTERNAL MEDICINE

## 2018-09-13 PROCEDURE — 82043 UR ALBUMIN QUANTITATIVE: CPT | Performed by: TRANSPLANT SURGERY

## 2018-09-13 PROCEDURE — 86644 CMV ANTIBODY: CPT | Performed by: INTERNAL MEDICINE

## 2018-09-13 PROCEDURE — 84550 ASSAY OF BLOOD/URIC ACID: CPT | Performed by: INTERNAL MEDICINE

## 2018-09-13 PROCEDURE — 86850 RBC ANTIBODY SCREEN: CPT | Performed by: INTERNAL MEDICINE

## 2018-09-13 PROCEDURE — 85730 THROMBOPLASTIN TIME PARTIAL: CPT | Performed by: INTERNAL MEDICINE

## 2018-09-13 RX ORDER — IOPAMIDOL 755 MG/ML
100 INJECTION, SOLUTION INTRAVASCULAR ONCE
Status: COMPLETED | OUTPATIENT
Start: 2018-09-13 | End: 2018-09-13

## 2018-09-13 RX ADMIN — IOPAMIDOL 100 ML: 755 INJECTION, SOLUTION INTRAVASCULAR at 12:57

## 2018-09-13 RX ADMIN — IOHEXOL 5 ML: 300 INJECTION, SOLUTION INTRAVENOUS at 07:50

## 2018-09-13 ASSESSMENT — PAIN SCALES - GENERAL: PAINLEVEL: NO PAIN (0)

## 2018-09-13 NOTE — MR AVS SNAPSHOT
After Visit Summary   9/13/2018    Eugenia Earl    MRN: [id number]           Patient Information     Date Of Birth          1970        Visit Information        Provider Department      9/13/2018 11:30 AM Bethany Chaudhary RN [facility] Solid [facility] Transplant        Today's Diagnoses     Transplant donor evaluation    -  1       Follow-ups after your visit        Your next 10 appointments already scheduled     Sep 13, 2018 12:15 PM CDT   (Arrive by 12:00 PM)   SOT SOCIAL WORK EVAL with ALO Nevarez   [facility] ([facility])    [address]  [address]  [place] [id number] [phone]            Sep 13, 2018  1:40 PM CDT   CTA RENAL WITH CONTRAST with UCCT2   [facility] CT ([facility])    [address]  1[address]  [facility] [id number] [phone]           How do I prepare for my exam? (Food and drink instructions) **You will have contrast for this exam.** Do not eat or drink for 2 hours before your exam. If you need to take medicine, you may take it with small sips of water. (We may ask you to take liquid medicine as well.)  The day before your exam, drink extra fluids at least six 8-ounce glasses (unless your doctor tells you to restrict your fluids).  How do I prepare for my exam? (Other instructions) Patients over 70 or patients with diabetes or kidney problems: If you haven t had a blood test (creatinine test) within the last 30 days, the Cardiologist/Radiologist may require you to get this test prior to your exam.  What should I wear: Please wear loose clothing, such as a sweat suit or jogging clothes.  Avoid snaps, zippers and other metal. We may ask you to undress and put on a hospital gown.  How long does the exam take: Most scans take less than 20 minutes.  What should I bring: Please bring any scans or X-rays taken at other hospitals, if similar tests were  done. Also bring a list of your medicines, including vitamins, minerals and over-the-counter drugs. It is safest to leave personal items at home.  Do I need a :  No  is needed.  What do I need to tell my doctor? Be sure to tell your doctor: * If you have any allergies. * If there s any chance you are pregnant. * If you are breastfeeding. * If you have diabetes as your medication may need to be adjusted for this exam.  What should I do after the exam: No restrictions, You may resume normal activities.  What is this test: A CT (computed tomography) scan is a series of pictures that allows us to look inside your body. The scanner creates images of the body in cross sections, much like slices of bread. This helps us see any problems more clearly. You may receive contrast (X-ray dye) before or during your scan. Contrast is given through an IV (small needle in your arm).  Who should I call with questions: If you have any questions, please call the Imaging Department where you will have your exam. Directions, parking instructions, and other information is available on our website, [email or web address].            Sep 13, 2018  2:00 PM CDT   ecg with  CV EKG   [facility] Xray ([facility])    [address] [place] [id number] [phone]            Sep 13, 2018  2:30 PM CDT   XR CHEST 2 VIEWS with UCXR1   [facility] ([facility])    [address] [place] [id number] [phone]           How do I prepare for my exam? (Food and drink instructions) No Food and Drink Restrictions.  How do I prepare for my exam? (Other instructions) You do not need to do anything special for this exam.  What should I wear: Wear comfortable clothes.  How long does the exam take: Most scans take less than 5 minutes.  What should I bring: Bring a list of your medicines, including vitamins, minerals  and over-the-counter drugs. It is safest to leave personal items at home.  Do I need a :  No  is needed.  What do I need to tell my doctor: Tell your doctor if there s any chance you are pregnant.  What should I do after the exam: No restrictions, You may resume normal activities.  What is this test: An image of a specific body part shown in shades of black and white.  Who should I call with questions: If you have any questions, please call the Imaging Department where you will have your exam. Directions, parking instructions, and other information is available on our website, [email or web address].              Who to contact     If you have questions or need follow up information about today's clinic visit or your schedule please contact [facility] directly at [phone].  Normal or non-critical lab and imaging results will be communicated to you by Moobiahart, letter or phone within 4 business days after the clinic has received the results. If you do not hear from us within 7 days, please contact the clinic through [employer] or phone. If you have a critical or abnormal lab result, we will notify you by phone as soon as possible.  Submit refill requests through shopa or call your pharmacy and they will forward the refill request to us. Please allow 3 business days for your refill to be completed.          Additional Information About Your Visit        shopa Information     shopa gives you secure access to your electronic health record. If you see a primary care provider, you can also send messages to your care team and make appointments. If you have questions, please call your primary care clinic.  If you do not have a primary care provider, please call [phone] and they will assist you.        Care EveryWhere ID     This is your Care EveryWhere ID. This could be used by other organizations to access your [facility] medical records  [id number]        Your Vitals Were     Last  Period                   08/10/2017 (Approximate)            Blood Pressure from Last 3 Encounters:   09/13/18 136/85   07/09/18 142/96   01/02/18 153/83    Weight from Last 3 Encounters:   09/13/18 49.9 kg (110 lb)   01/02/18 54.4 kg (120 lb)   11/10/17 54.4 kg (120 lb)              Today, you had the following     No orders found for display       Primary Care Provider Office Phone # Fax #    Yan MARRY Villegas [phone]       [facility] [place] [address] [place]  [place] [address]        Equal Access to Services     [facility]: Hadii elis black hadasho Soelham, waaxda luqadaha, qaybta kaalmada adequinyada, aditi parra . So [facility] [phone].    ATENCIÓN: Si habla español, tiene a mendoza disposición servicios gratuitos de asistencia lingüística. Llame al [phone].    We comply with applicable federal civil rights laws and [place] laws. We do not discriminate on the basis of race, color, national origin, age, disability, sex, sexual orientation, or gender identity.            Thank you!     Thank you for choosing [facility]  for your care. Our goal is always to provide you with excellent care. Hearing back from our patients is one way we can continue to improve our services. Please take a few minutes to complete the written survey that you may receive in the mail after your visit with us. Thank you!             Your Updated Medication List - Protect others around you: Learn how to safely use, store and throw away your medicines at www.disposemymeds.org.          This list is accurate as of 9/13/18 11:49 AM.  Always use your most recent med list.                   Brand Name Dispense Instructions for use Diagnosis    acetaminophen-codeine 300-30 MG per tablet    TYLENOL WITH CODEINE #3    12 tablet    Take 1-2 tablets by mouth every 6 hours as needed for pain

## 2018-09-13 NOTE — MR AVS SNAPSHOT
After Visit Summary   9/13/2018    Eugenia Earl    MRN: [id number]           Patient Information     Date Of Birth          1970        Visit Information        Provider Department      9/13/2018 7:30 AM UC 44 ATC; UC SPEC INFUSION [facility] Specialty [facility] Procedure        Today's Diagnoses     Transplant donor evaluation    -  1       Follow-ups after your visit        Who to contact     If you have questions or need follow up information about today's clinic visit or your schedule please contact [facility] PROCED[facility] directly at [phone].  Normal or non-critical lab and imaging results will be communicated to you by Carnivalhart, letter or phone within 4 business days after the clinic has received the results. If you do not hear from us within 7 days, please contact the clinic through [employer] or phone. If you have a critical or abnormal lab result, we will notify you by phone as soon as possible.  Submit refill requests through [employer] or call your pharmacy and they will forward the refill request to us. Please allow 3 business days for your refill to be completed.          Additional Information About Your Visit        MyChart Information     [employer] gives you secure access to your electronic health record. If you see a primary care provider, you can also send messages to your care team and make appointments. If you have questions, please call your primary care clinic.  If you do not have a primary care provider, please call [phone] and they will assist you.        Care EveryWhere ID     This is your Care EveryWhere ID. This could be used by other organizations to access your [facility] medical records  [id number]        Your Vitals Were     Last Period                   08/10/2017 (Approximate)            Blood Pressure from Last 3 Encounters:   09/13/18 136/85   07/09/18 142/96   01/02/18 153/83    Weight from Last 3 Encounters:    09/13/18 49.9 kg (110 lb)   01/02/18 54.4 kg (120 lb)   11/10/17 54.4 kg (120 lb)              We Performed the Following     Iohexol        Primary Care Provider Office Phone # Fax #    Yan MARRY Villegas [phone]       [facility] [place] [address] [place]  [place] [address]        Equal Access to Services     [facility] : Hadii aad ku hadasho Soomaali, waaxda luqadaha, qaybta kaalmada adeegyada, waxay idiin hayaan adeeg khfortinonaty lakiara . So [facility] [phone].    ATENCIÓN: Si garland foote, tiene a mendoza disposición servicios gratuitos de asistencia lingüística. Llame al [phone].    We comply with applicable federal civil rights laws and [place] laws. We do not discriminate on the basis of race, color, national origin, age, disability, sex, sexual orientation, or gender identity.            Thank you!     Thank you for choosing [facility] SPECIALTY [facility] PROCEDURE  for your care. Our goal is always to provide you with excellent care. Hearing back from our patients is one way we can continue to improve our services. Please take a few minutes to complete the written survey that you may receive in the mail after your visit with us. Thank you!             Your Updated Medication List - Protect others around you: Learn how to safely use, store and throw away your medicines at www.disposemymeds.org.          This list is accurate as of 9/13/18 11:59 PM.  Always use your most recent med list.                   Brand Name Dispense Instructions for use Diagnosis    acetaminophen-codeine 300-30 MG per tablet    TYLENOL WITH CODEINE #3    12 tablet    Take 1-2 tablets by mouth every 6 hours as needed for pain

## 2018-09-13 NOTE — MR AVS SNAPSHOT
After Visit Summary   9/13/2018    Eugenia Earl    MRN: [id number]           Patient Information     Date Of Birth          1970        Visit Information        Provider Department      9/13/2018 12:15 PM Charla Lee LICSW [facility] Solid Organ Transplant        Today's Diagnoses     Transplant donor evaluation    -  1       Follow-ups after your visit        Who to contact     If you have questions or need follow up information about today's clinic visit or your schedule please contact [facility] SOLID ORGAN TRANSPLANT directly at [phone].  Normal or non-critical lab and imaging results will be communicated to you by [facility], letter or phone within 4 business days after the clinic has received the results. If you do not hear from us within 7 days, please contact the clinic through [employer] or phone. If you have a critical or abnormal lab result, we will notify you by phone as soon as possible.  Submit refill requests through [employer] or call your pharmacy and they will forward the refill request to us. Please allow 3 business days for your refill to be completed.          Additional Information About Your Visit        MyChart Information     [employer] gives you secure access to your electronic health record. If you see a primary care provider, you can also send messages to your care team and make appointments. If you have questions, please call your primary care clinic.  If you do not have a primary care provider, please call [phone] and they will assist you.        Care EveryWhere ID     This is your Care EveryWhere ID. This could be used by other organizations to access your [facility] medical records  [id number]        Your Vitals Were     Last Period                   08/10/2017 (Approximate)            Blood Pressure from Last 3 Encounters:   09/13/18 136/85   07/09/18 142/96   01/02/18 153/83    Weight from Last 3 Encounters:   09/13/18 49.9 kg (110 lb)   01/02/18 54.4 kg  (120 lb)   11/10/17 54.4 kg (120 lb)              Today, you had the following     No orders found for display       Primary Care Provider Office Phone # Fax #    Yan Villegas [phone]       [facility] [place] [address]  [place] [address]        Equal Access to Services     [facility]: Hadii aad ku hadasho Soomaali, waaxda luqadaha, qaybta kaalmada adeegyada, waxay idiin hayaan adeeg kharash laTommyaan . So [facility] [phone].    ATENCIÓN: Si habla español, tiene a mendoza disposición servicios gratuitos de asistencia lingüística. Llame al [phone].    We comply with applicable federal civil rights laws and [place] laws. We do not discriminate on the basis of race, color, national origin, age, disability, sex, sexual orientation, or gender identity.            Thank you!     Thank you for choosing [facility] SOLID ORGAN TRANSPLANT  for your care. Our goal is always to provide you with excellent care. Hearing back from our patients is one way we can continue to improve our services. Please take a few minutes to complete the written survey that you may receive in the mail after your visit with us. Thank you!             Your Updated Medication List - Protect others around you: Learn how to safely use, store and throw away your medicines at www.disposemymeds.org.          This list is accurate as of 9/13/18 11:59 PM.  Always use your most recent med list.                   Brand Name Dispense Instructions for use Diagnosis    acetaminophen-codeine 300-30 MG per tablet    TYLENOL WITH CODEINE #3    12 tablet    Take 1-2 tablets by mouth every 6 hours as needed for pain

## 2018-09-13 NOTE — MR AVS SNAPSHOT
After Visit Summary   9/13/2018    Eugenia Earl    MRN: [id number]           Patient Information     Date Of Birth          1970        Visit Information        Provider Department      9/13/2018 8:45 AM Haley Loera RD [facility] Solid Organ Transplant        Today's Diagnoses     Transplant donor evaluation    -  1       Follow-ups after your visit        Your next 10 appointments already scheduled     Sep 13, 2018 10:40 AM CDT   (Arrive by 10:10 AM)   Kidney Donor Evaluation with  Kidney Donor Miki   [facility] Nephrology ([facility])    [address]  [address]  [place] [address]   [phone]            Sep 13, 2018 11:30 AM CDT   (Arrive by 11:15 AM)   SOT CARE COORDINATOR EVAL with Bethany Chaudhary RN   [facility] Solid Organ Transplant ([facility])    [address]  [address]  [place] [address]   [phone]            Sep 13, 2018 12:15 PM CDT   (Arrive by 12:00 PM)   SOT SOCIAL WORK EVAL with Charla Lee [facility] Solid Organ Transplant ([facility])    [address]  [address]  [place] [address]   [phone]            Sep 13, 2018  1:40 PM CDT   CTA RENAL WITH CONTRAST with UCCT2   [facility] [place] ([facility])    [address]  1st Floor  [place] [address]   [phone]           How do I prepare for my exam? (Food and drink instructions) **You will have contrast for this exam.** Do not eat or drink for 2 hours before your exam. If you need to take medicine, you may take it with small sips of water. (We may ask you to take liquid medicine as well.)  The day before your exam, drink extra fluids at least six 8-ounce glasses (unless your doctor tells you to restrict your fluids).  How do I prepare for my exam? (Other instructions) Patients over 70 or patients with diabetes or kidney  problems: If you haven t had a blood test (creatinine test) within the last 30 days, the Cardiologist/Radiologist may require you to get this test prior to your exam.  What should I wear: Please wear loose clothing, such as a sweat suit or jogging clothes.  Avoid snaps, zippers and other metal. We may ask you to undress and put on a hospital gown.  How long does the exam take: Most scans take less than 20 minutes.  What should I bring: Please bring any scans or X-rays taken at other hospitals, if similar tests were done. Also bring a list of your medicines, including vitamins, minerals and over-the-counter drugs. It is safest to leave personal items at home.  Do I need a :  No  is needed.  What do I need to tell my doctor? Be sure to tell your doctor: * If you have any allergies. * If there s any chance you are pregnant. * If you are breastfeeding. * If you have diabetes as your medication may need to be adjusted for this exam.  What should I do after the exam: No restrictions, You may resume normal activities.  What is this test: A CT (computed tomography) scan is a series of pictures that allows us to look inside your body. The scanner creates images of the body in cross sections, much like slices of bread. This helps us see any problems more clearly. You may receive contrast (X-ray dye) before or during your scan. Contrast is given through an IV (small needle in your arm).  Who should I call with questions: If you have any questions, please call the Imaging Department where you will have your exam. Directions, parking instructions, and other information is available on our website, [email or web address].            Sep 13, 2018  2:00 PM CDT   ecg with  CV EKG   [facility] X[facility] ([facility])    [address] [address] [phone]            Sep 13, 2018  2:30 PM CDT   XR CHEST 2 VIEWS with XR1   [facility]  ([facility])    [address]  1st Floor  [facility] [address] [phone]           How do I prepare for my exam? (Food and drink instructions) No Food and Drink Restrictions.  How do I prepare for my exam? (Other instructions) You do not need to do anything special for this exam.  What should I wear: Wear comfortable clothes.  How long does the exam take: Most scans take less than 5 minutes.  What should I bring: Bring a list of your medicines, including vitamins, minerals and over-the-counter drugs. It is safest to leave personal items at home.  Do I need a :  No  is needed.  What do I need to tell my doctor: Tell your doctor if there s any chance you are pregnant.  What should I do after the exam: No restrictions, You may resume normal activities.  What is this test: An image of a specific body part shown in shades of black and white.  Who should I call with questions: If you have any questions, please call the Imaging Department where you will have your exam. Directions, parking instructions, and other information is available on our website, [email or web address].              Who to contact     If you have questions or need follow up information about today's clinic visit or your schedule please contact [facility] SOLID ORGAN TRANSPLANT directly at [phone].  Normal or non-critical lab and imaging results will be communicated to you by MyChart, letter or phone within 4 business days after the clinic has received the results. If you do not hear from us within 7 days, please contact the clinic through MyChart or phone. If you have a critical or abnormal lab result, we will notify you by phone as soon as possible.  Submit refill requests through [employer] or call your pharmacy and they will forward the refill request to us. Please allow 3 business days for your refill to be completed.          Additional Information About Your Visit        [employer] Information      [employer] gives you secure access to your electronic health record. If you see a primary care provider, you can also send messages to your care team and make appointments. If you have questions, please call your primary care clinic.  If you do not have a primary care provider, please call [phone] and they will assist you.        Care EveryWhere ID     This is your Care EveryWhere ID. This could be used by other organizations to access your [facility] medical records  [id number]        Your Vitals Were     Last Period                   08/10/2017 (Approximate)            Blood Pressure from Last 3 Encounters:   07/09/18 142/96   01/02/18 153/83   11/10/17 114/76    Weight from Last 3 Encounters:   01/02/18 54.4 kg (120 lb)   11/10/17 54.4 kg (120 lb)   10/19/17 54.4 kg (120 lb)              Today, you had the following     No orders found for display       Primary Care Provider Office Phone # Fax #    Yan MARRY Villegas [phone]       [facility] [place] [address] [place] [address]        Equal Access to Services     [facility]: Hadii aad ku hadasho Soomaali, waaxda luqadaha, qaybta kaalmada jacklyn, aditi parra . So [facility] [phone].    ATENCIÓN: Si habla español, tiene a mendoza disposición servicios gratZuni Hospitalos de asistencia lingüística. [facility] [phone].    We comply with applicable federal civil rights laws and [place] laws. We do not discriminate on the basis of race, color, national origin, age, disability, sex, sexual orientation, or gender identity.            Thank you!     Thank you for choosing [facility] SOLID ORGAN TRANSPLANT  for your care. Our goal is always to provide you with excellent care. Hearing back from our patients is one way we can continue to improve our services. Please take a few minutes to complete the written survey that you may receive in the mail after your visit with us. Thank you!             Your Updated  Medication List - Protect others around you: Learn how to safely use, store and throw away your medicines at www.disposemymeds.org.          This list is accurate as of 9/13/18 10:27 AM.  Always use your most recent med list.                   Brand Name Dispense Instructions for use Diagnosis    acetaminophen-codeine 300-30 MG per tablet    TYLENOL WITH CODEINE #3    12 tablet    Take 1-2 tablets by mouth every 6 hours as needed for pain

## 2018-09-13 NOTE — PROGRESS NOTES
Outpatient MNT: Kidney Donor Evaluation    Current BMI: 20.8 (HT 61 in,  lbs/50 kg)  BMI is within criteria of <30 for kidney donation    8 Year Risk of Diabetes  5%     Time Spent: 15 minutes  Visit Type: Initial  Referring Physician: Dr Mendoza  Pt accompanied by: her friend, Wendi     Nutrition Assessment  Two days a week pt goes completely without food d/t being busy and/or not hungry. The other days of the week pt has a somewhat erratic eating schedule (not routine).     Vitamins, Supplements, Pertinent Meds: none   Herbal Medicines/Supplements: none     Diet Recall  Breakfast None    Lunch Occasionally eats - chicken caesar wraps, Mexican food (tacos), pizza from a restaurant    Dinner Leftovers at her parents house or toast or s/w at home    Snacks Chocolate, ritz crackers    Beverages Coffee with a lot of creamer, water    Alcohol 2-3 beer/week    Dining out 4x/week      Physical Activity  Walking      Anthropometrics  Height:   61 in   BMI:    20.8    Weight Status:Normal BMI   Weight:  110 lbs            IBW (lb): 105  % IBW: 105    Wt Hx: No changes     Adj/dosing BW: 110 lbs/50 kg       Labs  Recent Labs   Lab Test  09/13/18   0730   CHOL  261*   HDL  62   LDL  180*   TRIG  98       FBG = 102; high several times 5952-2611 (unsure if all were fasting), 110, 104, 103  A1C = 5.5    Prediction of Incident Diabetes Mellitus in Middle-aged Adults: The [employer]a Offspring Study  Bradford Lamas MD; James B. Meigs, MD, MPH; Lala López, PhD; Tehresa Kowalski MD, MPH; Emil Mireles MD; Cabrera Silva Sr,   PhD  Pt's estimated risk for T2DM (per Table 6 above)  Pt received points for the following criteria: BG>100, parental h/o DM (mom)  Total points: 13  8-Year risk of T2DM: 5%    Estimated Nutrition Needs  Energy  2717-0219    (25-30 kcal/kg for maintenance)     Protein  40-50    (0.8-1 g/kg for maintenance)           Fluid  1 ml/kcal or per MD     Nutrition Diagnosis  Excessive Na+  intake r/t food and nutrition related knowledge deficit AEB diet recall reveals high Na+ foods.    Food and nutrition related knowledge deficit r/t pre transplant donor eval pt AEB pt verbalized not hearing pre/post transplant diet guidelines.    Nutrition Intervention  Nutrition education provided:  Reviewed overall healthy diet guidelines for pre and post kidney donation. Discussed maintenance of a healthy weight and Na+ intake <3000 mg/day (<2000 mg/day if HTN).    Avoid the following post txp d/t risk for rejection, unknown effects on the organs, and/or potential interactions with immunosuppressants:  - Herbal, Chinese, holistic, chiropractic, natural, alternative medicines and supplements  - Detoxes and cleanses  - Weight loss pills  - Protein powders or other products with extracts or herbs (ie green tea extract)    Patient Understanding: Pt verbalized understanding of education provided.  Expected Compliance: Good  Follow-Up Plans: PRN     Nutrition Goals  Pt to verbalize understanding of education provided     Provided pt with contact info.   Marilu Loera RD, LD  [facility] [phone]

## 2018-09-13 NOTE — PROGRESS NOTES
Assessment and Plan:  1. Prospective kidney transplant donor with a few issues that need to be addressed prior to donation. Patient's blood pressure is acceptable at this visit, kidney function appears to be possibly low with Iohexol pending, and urinalysis is bland.    - Possible murmur vs increased venous flow, will check echocardiogram   - History of manic depression will need formal assessment   - Iohexaol reviewed and the raw clearance was low     Discussed the risks of donating a kidney, including the surgical risk and the possible risks of living with one kidney.    Education about expected post-donation kidney function and how chronic kidney disease (CKD) and end stage kidney disease (ESKD) might potentially impact the donor in the future, include, but not limited to:       - On average, donors will have 25-35% permanent loss of kidney function at donation.       - Baseline risk of ESKD may slightly exceed that of members of the general population with the same demographic profile.       - Donor risks must be interpreted in light of known epidemiology of both CKD or ESKD, such as that CKD generally develops in midlife (40-50 years old) and ESKD generally develops after age 60.       - Medical evaluation of young potential donors cannot predict lifetime risk of CKD or ESKD.       - Donors may be at higher risk for CKD if they sustain damage to the remaining kidney.       - Development of CKD and progression of ESKD may be more rapid with only 1 kidney.       - Some type of kidney replacement therapy, either kidney transplant or dialysis, is required when reaching ESKD.    Potential medical or surgical risks include, but not limited to:       - Death.       - Scars, pain, fatigue, and other consequences typical of any surgical procedure.       - Decreased kidney function.       - Abdominal or bowel symptoms, such as bloating and nausea, and developing bowel obstruction.       - Kidney failure (ESKD) and the  need for a kidney transplant or dialysis for the donor.       - Impact of obesity, hypertension, or other donor-specific medical conditions on morbidity and mortality of the potential donor.    Patients overall evaluation will be discussed with the transplant team and a final recommendation on the patients' suitability to be a kidney transplant donor will be made at that time.    Consult:  Eugenia Earl was seen in consultation at the request of Dr. Betina Mendoza for evaluation as a potential kidney transplant donor.    Reason for Visit:  Eugenia Earl is a 48 year old female who presents for a kidney donor evaluation.  Patient would like to donate to a good friend.    HPI:      Eugenia was seen today in the company of her friend.  She was given the opportunity to excuse different from the room if needed however she elected to have the interview conducted while the friend in the room.  Eugenia perceives herself as a healthy individual.  She has no known chronic conditions.  She was forthcoming about her remote history of manic depressive disorder for which she is now on any medication and feels that she had developed coping skills to help her stay functional.  She smokes marijuana recreationally to avoid taking medications for depression.  Eugenia works full-time and admits to fast food diet most of the days of the week in addition to lack of exercise.           Kidney Disease Hx:       h/o Kidney Problems: No  Family h/o Genetic Kidney Disease: No       h/o HTN: No    Usual BP: 120/80       h/o Protein in Urine: No  h/o Blood in Urine: No       h/o Kidney Stones: No  h/o Kidney Injury: No       h/o Recurrent UTI: No  h/o Genitourinary Problems: No       h/o Chronic NSAID Use: No         Other Medical Hx:       h/o DM: No   h/o Gestational DM: No       h/o Preeclampsia: No          h/o Gastrointestinal, Pancreas or Liver Problems: No       h/o Lung or Heart Problems: No       h/o Hematologic Problems: No  h/o Bleeding or  Clotting Problems: No       h/o Cancer: No       h/o Infection Problems: No         Skin Cancer Risk:       h/o more than 50 moles: No       h/o extensive sun exposure: No       h/o melanoma: No       Family h/o melanoma: No         Mental Health Assessment:       h/o Depression: Yes: feels undercontrol        h/o Psychiatric Illness: Manic/Depressive        h/o Suicidal Attempt(s): Yes: Remote 25 years ago     ROS:   A comprehensive review of systems was obtained and negative, except as noted in the HPI or PMH.    PMH:   History was taken from the patient as noted below.  Past Medical History:   Diagnosis Date     Dysmenorrhea 04/16/2001     Endometriosis of uterus 05/10/2001     Endometriosis, site unspecified 06/18/2001     Follow-up examination, following other surgery 10/01/2008     Follow-up examination, following unspecified surgery 04/19/2001     Nonallopathic lesion of cervical region, not elsewhere classified 12/19/2001       PSH:   Past Surgical History:   Procedure Laterality Date     LAMINECTOMY LUMBAR POSTERIOR MICROSCOPIC ONE LEVEL N/A 9/30/2015    Procedure: LAMINECTOMY LUMBAR POSTERIOR MICROSCOPIC ONE LEVEL;  Surgeon: Danyel Kaba MD;  Location: [facility]     LAPAROSCOPIC ASSISTED HYSTERECTOMY VAGINAL, BILATERAL SALPINGO-OOPHORECTOMY, COMBINED Left 9/27/2017    Procedure: COMBINED LAPAROSCOPIC ASSISTED HYSTERECTOMY VAGINAL, SALPINGO-OOPHORECTOMY;  LAPAROSCOPIC ASSISTED VAGINAL HYSTERECTOMY, BILATERAL SALPINGECTOMY, LEFT OOPHORECTOMY, Cystoscopy;  Surgeon: Chapo Szymanski MD;  Location: [facility]     TONSILLECTOMY       TUBAL LIGATION       Personal or family history of anesthesia problems: No    Family Hx:   Family History   Problem Relation Age of Onset     Diabetes Mother      Coronary Artery Disease Father      Hyperlipidemia Father           Specific Family Hx:       FH of DM: Yes (mom at age 70)       FH of CAD: No  FH of HTN: Yes        FH of Cancer: No  FH of Kidney Cancer: No    Personal Hx:  "  Social History     Social History     Marital status: Single     Spouse name: N/A     Number of children: N/A     Years of education: N/A     Occupational History     Not on file.     Social History Main Topics     Smoking status: Former Smoker     Smokeless tobacco: Never Used     Alcohol use Yes      Comment: occasionally     Drug use: No     Sexual activity: Yes     Partners: Male     Other Topics Concern      Service No     Blood Transfusions No     Caffeine Concern No     Occupational Exposure No     Hobby Hazards No     Sleep Concern No     Stress Concern No     Weight Concern No     Special Diet No     Back Care Yes     Exercise Yes     Seat Belt No     Self-Exams Yes     Social History Narrative          Specific Social Hx:       Health Insurance Status: Yes (working on it)       Employment Status: Full time  Occupation: optical position                        Living Arrangements: lives with their son and daughter        Social Support: Yes       Presence of increased risk for disease transmission behaviors as defined by PHS guidelines: No        Allergies:  Allergies   Allergen Reactions     Nka [No Known Allergies]        Medications:  Prior to Admission medications    Medication Sig Start Date End Date Taking? Authorizing Provider   acetaminophen-codeine (TYLENOL WITH CODEINE #3) 300-30 MG per tablet Take 1-2 tablets by mouth every 6 hours as needed for pain  Patient not taking: Reported on 9/13/2018 7/9/18   Andrea Whitley MD       Vitals:  Vital Signs 9/13/2018 9/13/2018 9/13/2018   Systolic 124 128 136   Diastolic 83 84 85   Pulse 69 - -   Temperature 97.5 - -   Respirations - - -   Weight (LB) 110 lb - -   Height 5' 1\" - -   BMI (Calculated) 20.83 - -   Pain - - -   O2 100 - -       Exam:   GENERAL APPEARANCE: alert and no distress  HENT: mouth without ulcers or lesions  LYMPHATICS: no cervical or supraclavicular nodes  RESP: lungs clear to auscultation - no rales, rhonchi or wheezes  CV: " regular rhythm, normal rate, no rub, no murmur  EDEMA: no LE edema bilaterally  ABDOMEN: soft, nondistended, nontender, bowel sounds normal  MS: extremities normal - no gross deformities noted, no evidence of inflammation in joints, no muscle tenderness  SKIN: no rash    Results:   Labs and imaging were ordered for this visit and reviewed by me.  Recent Results (from the past 336 hour(s))   ABO/Rh type and screen    Collection Time: 09/13/18  7:30 AM   Result Value Ref Range    ABO B     RH(D) Pos     Antibody Screen Neg     Test Valid Only At          [facility]    Specimen Expires 09/16/2018    Lipid Profile    Collection Time: 09/13/18  7:30 AM   Result Value Ref Range    Cholesterol 261 (H) <200 mg/dL    Triglycerides 98 <150 mg/dL    HDL Cholesterol 62 >49 mg/dL    LDL Cholesterol Calculated 180 (H) <100 mg/dL    Non HDL Cholesterol 199 (H) <130 mg/dL   Comprehensive metabolic panel    Collection Time: 09/13/18  7:30 AM   Result Value Ref Range    Sodium 138 133 - 144 mmol/L    Potassium 4.0 3.4 - 5.3 mmol/L    Chloride 104 94 - 109 mmol/L    Carbon Dioxide 30 20 - 32 mmol/L    Anion Gap 4 3 - 14 mmol/L    Glucose 102 (H) 70 - 99 mg/dL    Urea Nitrogen 12 7 - 30 mg/dL    Creatinine 0.84 0.52 - 1.04 mg/dL    GFR Estimate 73 >60 mL/min/1.7m2    GFR Estimate If Black 88 >60 mL/min/1.7m2    Calcium 9.1 8.5 - 10.1 mg/dL    Bilirubin Total 0.6 0.2 - 1.3 mg/dL    Albumin 4.3 3.4 - 5.0 g/dL    Protein Total 7.8 6.8 - 8.8 g/dL    Alkaline Phosphatase 65 40 - 150 U/L    ALT 24 0 - 50 U/L    AST 18 0 - 45 U/L   Phosphorus    Collection Time: 09/13/18  7:30 AM   Result Value Ref Range    Phosphorus 3.6 2.5 - 4.5 mg/dL   Hemoglobin A1c    Collection Time: 09/13/18  7:30 AM   Result Value Ref Range    Hemoglobin A1C 5.5 0 - 5.6 %   INR    Collection Time: 09/13/18  7:30 AM   Result Value Ref Range    INR 0.95 0.86 - 1.14   Partial thromboplastin time    Collection Time: 09/13/18   7:30 AM   Result Value Ref Range    PTT 34 22 - 37 sec   CBC with platelets    Collection Time: 09/13/18  7:30 AM   Result Value Ref Range    WBC 6.5 4.0 - 11.0 10e9/L    RBC Count 5.19 3.8 - 5.2 10e12/L    Hemoglobin 15.3 11.7 - 15.7 g/dL    Hematocrit 47.3 (H) 35.0 - 47.0 %    MCV 91 78 - 100 fl    MCH 29.5 26.5 - 33.0 pg    MCHC 32.3 31.5 - 36.5 g/dL    RDW 12.1 10.0 - 15.0 %    Platelet Count 301 150 - 450 10e9/L   Uric acid    Collection Time: 09/13/18  7:30 AM   Result Value Ref Range    Uric Acid 3.1 2.6 - 6.0 mg/dL   Protein  random urine with Creat Ratio    Collection Time: 09/13/18  7:46 AM   Result Value Ref Range    Protein Random Urine <0.05 g/L    Protein Total Urine g/gr Creatinine Unable to calculate due to low value 0 - 0.2 g/g Cr   Routine UA with microscopic    Collection Time: 09/13/18  7:46 AM   Result Value Ref Range    Color Urine Straw     Appearance Urine Clear     Glucose Urine Negative NEG^Negative mg/dL    Bilirubin Urine Negative NEG^Negative    Ketones Urine Negative NEG^Negative mg/dL    Specific Gravity Urine 1.006 1.003 - 1.035    Blood Urine Negative NEG^Negative    pH Urine 7.0 5.0 - 7.0 pH    Protein Albumin Urine Negative NEG^Negative mg/dL    Urobilinogen mg/dL 0.0 0.0 - 2.0 mg/dL    Nitrite Urine Negative NEG^Negative    Leukocyte Esterase Urine Negative NEG^Negative    Source Midstream Urine     WBC Urine 1 0 - 5 /HPF    RBC Urine 1 0 - 2 /HPF    Bacteria Urine Few (A) NEG^Negative /HPF    Squamous Epithelial /HPF Urine 1 0 - 1 /HPF    Mucous Urine Present (A) NEG^Negative /LPF   Albumin Random Urine Quantitative with Creat Ratio    Collection Time: 09/13/18  7:46 AM   Result Value Ref Range    Creatinine Urine 40 mg/dL    Albumin Urine mg/L <5 mg/L    Albumin Urine mg/g Cr Unable to calculate due to low value 0 - 25 mg/g Cr

## 2018-09-13 NOTE — DISCHARGE INSTRUCTIONS

## 2018-09-13 NOTE — NURSING NOTE
"Saw Eugenia in clinic for kidney donor evaluation.    Came with her friend Wendi on 18.  Has offered to be a kidney donor to her friend: Elton Garcia.   Eugenia and Wendi attended the group education class.  Discussed surgery, hospitalization, and recovery.  Discussed the next steps after evaluation, including living donor selection committee.  Eugenia knows when and how to obtain evaluation results and the process for scheduling surgery --> if she is approved she would like to do this \"as soon as possible\".  [facility].  Donor was Provided Living Donor Collective (LDC) Materials? Yes  Donor has agreed to be contacted by [facility] for follow-up LDC Questions? Yes  Signed consent for donor evaluation.  Donor Signed MICKI.  Requested routine cancer screening tests:    Mammogram --> due now    Pap smear --> hx. of hysterectomy  Tissue typing collected.   Questions answered.  Approx. time spent:  45 minutes  Donor has medical insurance:  Yes (\"State\" insurance)  Donor has life insurance:  No      Living Kidney Donor Consent per OPTN Policy 14.3 for Transplant Coordinators    Written assurance has been obtained from the patient that the donor:   1) Is willing to donate  2) Is free from inducement and coercion  3) Has been informed that the donor may decline to donate at any time  4) Has been informed that transplant centers must:     A) Offer donors an opportunity to discontinue the donor consent or evaluation process in a way that is protected and confidential    B) Provide an independent donor advocate (ERNST) to assist the potential donor during this process    The following was presented in a language in which donor is able to engage in meaningful dialogue and was reviewed and discussed with the patient by the transplant coordinator and the physician.     The following has been provided:   Education and instruction about all phases of the living donation process includin) Consent  2) The donor must undergo " medical and psychosocial evaluations  3) Disclosure that the recovery hospital will take all reasonable precautions to provide confidentiality for the donor/recipient  4) Disclosure that it is a federal crime for any person to knowingly acquire, obtain or otherwise transfer any human organ for valuable consideration  5) The transplant hospital may refuse the potential donor, and the donor could be evaluated by another transplant program with different selection criteria  6) Data from the most recent SRTR center-specific reports:     A) National 1-year patient and graft survival rates    B) Hospital s 1-year patient and graft survival rates    C) Notification about all CMS outcome requirements not being met by the recovery and recipient s hospitals    The following has been provided:   1) Education about expected post-donation kidney function and how chronic kidney disease and end-stage renal disease might potentially impact the donor in the future to include:    A) On average, donors will have 25-35% permanent loss of kidney function at donation    B) Baseline risk of End Stage Renal Disease (ESRD)  does not exceed that of members of general population with same demographic profile    C) Donor risks must be interpreted in light of known epidemiology of both Chronic Kidney Disease (CKD) or ESRD    D) CKD generally develops in midlife (40-50 years old)    E) ESRD generally develops after age 60    F) Medical evaluation of young potential donor cannot predict lifetime risk  2) Donors may be at higher risk for CKD if they sustain damage to the remaining kidney. 3) Development of CKD and progression to ESRD may be more rapid with only 1 kidney  4) Dialysis is required when reaching ESRD  5) Current practice prioritizes prior living kidney donors who became kidney transplant candidates  6) Alternate procedures or courses of treatment for the recipient, including  donor transplant    A) A  donor kidney may  become available for the recipient before donor evaluation is complete or transplant occurs    B) Any transplant candidate may have risk factors for increased morbidity or mortality that are not disclosed to the potential donor  7) The patient will receive a thorough medical and psychosocial evaluation  8) Health information obtained during the evaluation is subject to the same regulations as all records and could reveal conditions that must be reported to local, state, or federal public health authorities  9) The [facility] [place], is required to report living donor follow up information at 6, 12, and 24 months  10) Potential donors must commit to post operative follow up testing coordinated by the [facility]  11) Any infectious disease or malignancy pertinent to acute recipient care discovered during the potential donor s first two years of follow up care:    A) Will be disclosed to the donor    B) May need to be reported to local, state or federal public health authorities    C) Will be disclosed to their recipient s transplant center, and    D) Will be reported through the \[facility] [place] [facility]\"" Improving Patient Safety Portal    Disclosure has been provided that these risks may be transient or permanent & include but are not limited to potential medical or surgical risks:    Death    Scars, pain, fatigue, and other consequences typical of any surgical procedure    Decreased kidney function    Abdominal or bowel symptoms such as bloating and nausea, and developing bowel obstruction    Kidney failure and the need for dialysis or kidney transplant for the donor  Impact of obesity, hypertension or other donor-specific medical conditions on morbidity and mortality of the potential donor    Questions answered.  I will call Eugenia next Wednesday to review committee recommendations.  Eugenia knows how to get in contact with me with any further questions/concerns.   Earlene Chaudhary RN, BSN, CCTN  Living Donor  Coordinator  [phone]

## 2018-09-13 NOTE — MR AVS SNAPSHOT
"              After Visit Summary   9/13/2018    Eugenia Earl    MRN: [id number]           Patient Information     Date Of Birth          1970        Visit Information        Provider Department      9/13/2018 10:40 AM [facility]        Today's Diagnoses     Heart murmur    -  1       Follow-ups after your visit        Who to contact     If you have questions or need follow up information about today's clinic visit or your schedule please contact [facility] directly at [phone].  Normal or non-critical lab and imaging results will be communicated to you by [employer], letter or phone within 4 business days after the clinic has received the results. If you do not hear from us within 7 days, please contact the clinic through [employer] or phone. If you have a critical or abnormal lab result, we will notify you by phone as soon as possible.  Submit refill requests through [employer] or call your pharmacy and they will forward the refill request to us. Please allow 3 business days for your refill to be completed.          Additional Information About Your Visit        MyChart Information     [employer] gives you secure access to your electronic health record. If you see a primary care provider, you can also send messages to your care team and make appointments. If you have questions, please call your primary care clinic.  If you do not have a primary care provider, please call [phone] and they will assist you.        Care EveryWhere ID     This is your Care EveryWhere ID. This could be used by other organizations to access your [place] medical records  [id number]        Your Vitals Were     Pulse Temperature Height Last Period Pulse Oximetry BMI (Body Mass Index)    69 97.5  F (36.4  C) (Oral) 1.549 m (5' 1\") 08/10/2017 (Approximate) 100% 20.78 kg/m2       Blood Pressure from Last 3 Encounters:   09/13/18 136/85   07/09/18 142/96   01/02/18 153/83    Weight from Last 3 " Encounters:   09/13/18 49.9 kg (110 lb)   01/02/18 54.4 kg (120 lb)   11/10/17 54.4 kg (120 lb)               Primary Care Provider Office Phone # Fax #    Yan MARRY Villegas [phone]        [place] [address]  [place] [address]        Equal Access to Services     [facility] HospitalDAVID : Hadii aad ku hadasho Soomaali, waaxda luqadaha, qaybta kaalmada adeegyada, waxay idiin hayaan adeeg khfortinosh la'aan ah. So [facility] [phone].    ATENCIÓN: Si habla qamar, tiene a mendoza disposición servicios gratuitos de asistencia lingüística. Ruchi al [phone].    We comply with applicable federal civil rights laws and [place] laws. We do not discriminate on the basis of race, color, national origin, age, disability, sex, sexual orientation, or gender identity.            Thank you!     Thank you for choosing [facility]  for your care. Our goal is always to provide you with excellent care. Hearing back from our patients is one way we can continue to improve our services. Please take a few minutes to complete the written survey that you may receive in the mail after your visit with us. Thank you!             Your Updated Medication List - Protect others around you: Learn how to safely use, store and throw away your medicines at www.disposemymeds.org.          This list is accurate as of 9/13/18 11:59 PM.  Always use your most recent med list.                   Brand Name Dispense Instructions for use Diagnosis    acetaminophen-codeine 300-30 MG per tablet    TYLENOL WITH CODEINE #3    12 tablet    Take 1-2 tablets by mouth every 6 hours as needed for pain

## 2018-09-13 NOTE — LETTER
9/13/2018      RE: Eugenia Earl  [address] [place] [address]       Assessment and Plan:  1. Prospective kidney transplant donor with a few issues that need to be addressed prior to donation. Patient's blood pressure is acceptable at this visit, kidney function appears to be possibly low with Iohexol pending, and urinalysis is bland.    - Possible murmur vs increased venous flow, will check echocardiogram   - History of manic depression will need formal assessment   - Iohexaol reviewed and the raw clearance was low     Discussed the risks of donating a kidney, including the surgical risk and the possible risks of living with one kidney.    Education about expected post-donation kidney function and how chronic kidney disease (CKD) and end stage kidney disease (ESKD) might potentially impact the donor in the future, include, but not limited to:       - On average, donors will have 25-35% permanent loss of kidney function at donation.       - Baseline risk of ESKD may slightly exceed that of members of the general population with the same demographic profile.       - Donor risks must be interpreted in light of known epidemiology of both CKD or ESKD, such as that CKD generally develops in midlife (40-50 years old) and ESKD generally develops after age 60.       - Medical evaluation of young potential donors cannot predict lifetime risk of CKD or ESKD.       - Donors may be at higher risk for CKD if they sustain damage to the remaining kidney.       - Development of CKD and progression of ESKD may be more rapid with only 1 kidney.       - Some type of kidney replacement therapy, either kidney transplant or dialysis, is required when reaching ESKD.    Potential medical or surgical risks include, but not limited to:       - Death.       - Scars, pain, fatigue, and other consequences typical of any surgical procedure.       - Decreased kidney function.       - Abdominal or bowel symptoms, such as bloating and  nausea, and developing bowel obstruction.       - Kidney failure (ESKD) and the need for a kidney transplant or dialysis for the donor.       - Impact of obesity, hypertension, or other donor-specific medical conditions on morbidity and mortality of the potential donor.    Patients overall evaluation will be discussed with the transplant team and a final recommendation on the patients' suitability to be a kidney transplant donor will be made at that time.    Consult:  Eugenia Earl was seen in consultation at the request of Dr. Betina Mendoza for evaluation as a potential kidney transplant donor.    Reason for Visit:  Eugenia Earl is a 48 year old female who presents for a kidney donor evaluation.  Patient would like to donate to a good friend.    HPI:      Eugenia was seen today in the company of her friend.  She was given the opportunity to excuse different from the room if needed however she elected to have the interview conducted while the friend in the room.  Eugenia perceives herself as a healthy individual.  She has no known chronic conditions.  She was forthcoming about her remote history of manic depressive disorder for which she is now on any medication and feels that she had developed coping skills to help her stay functional.  She smokes marijuana recreationally to avoid taking medications for depression.  Eugenia works full-time and admits to fast food diet most of the days of the week in addition to lack of exercise.           Kidney Disease Hx:       h/o Kidney Problems: No  Family h/o Genetic Kidney Disease: No       h/o HTN: No    Usual BP: 120/80       h/o Protein in Urine: No  h/o Blood in Urine: No       h/o Kidney Stones: No  h/o Kidney Injury: No       h/o Recurrent UTI: No  h/o Genitourinary Problems: No       h/o Chronic NSAID Use: No         Other Medical Hx:       h/o DM: No   h/o Gestational DM: No       h/o Preeclampsia: No          h/o Gastrointestinal, Pancreas or Liver Problems: No       h/o Lung  or Heart Problems: No       h/o Hematologic Problems: No  h/o Bleeding or Clotting Problems: No       h/o Cancer: No       h/o Infection Problems: No         Skin Cancer Risk:       h/o more than 50 moles: No       h/o extensive sun exposure: No       h/o melanoma: No       Family h/o melanoma: No         Mental Health Assessment:       h/o Depression: Yes: feels undercontrol        h/o Psychiatric Illness: Manic/Depressive        h/o Suicidal Attempt(s): Yes: Remote 25 years ago     ROS:   A comprehensive review of systems was obtained and negative, except as noted in the HPI or PMH.    PMH:   History was taken from the patient as noted below.  Past Medical History:   Diagnosis Date     Dysmenorrhea 04/16/2001     Endometriosis of uterus 05/10/2001     Endometriosis, site unspecified 06/18/2001     Follow-up examination, following other surgery 10/01/2008     Follow-up examination, following unspecified surgery 04/19/2001     Nonallopathic lesion of cervical region, not elsewhere classified 12/19/2001       PSH:   Past Surgical History:   Procedure Laterality Date     LAMINECTOMY LUMBAR POSTERIOR MICROSCOPIC ONE LEVEL N/A 9/30/2015    Procedure: LAMINECTOMY LUMBAR POSTERIOR MICROSCOPIC ONE LEVEL;  Surgeon: Danyel Kaba MD;  Location: [facility]     LAPAROSCOPIC ASSISTED HYSTERECTOMY VAGINAL, BILATERAL SALPINGO-OOPHORECTOMY, COMBINED Left 9/27/2017    Procedure: COMBINED LAPAROSCOPIC ASSISTED HYSTERECTOMY VAGINAL, SALPINGO-OOPHORECTOMY;  LAPAROSCOPIC ASSISTED VAGINAL HYSTERECTOMY, BILATERAL SALPINGECTOMY, LEFT OOPHORECTOMY, Cystoscopy;  Surgeon: Chapo Szymanski MD;  Location: [facility]     TONSILLECTOMY       TUBAL LIGATION       Personal or family history of anesthesia problems: No    Family Hx:   Family History   Problem Relation Age of Onset     Diabetes Mother      Coronary Artery Disease Father      Hyperlipidemia Father           Specific Family Hx:       FH of DM: Yes (mom at age 70)       FH of CAD: No  FH of  "HTN: Yes        FH of Cancer: No  FH of Kidney Cancer: No    Personal Hx:   Social History     Social History     Marital status: Single     Spouse name: N/A     Number of children: N/A     Years of education: N/A     Occupational History     Not on file.     Social History Main Topics     Smoking status: Former Smoker     Smokeless tobacco: Never Used     Alcohol use Yes      Comment: occasionally     Drug use: No     Sexual activity: Yes     Partners: Male     Other Topics Concern      Service No     Blood Transfusions No     Caffeine Concern No     Occupational Exposure No     Hobby Hazards No     Sleep Concern No     Stress Concern No     Weight Concern No     Special Diet No     Back Care Yes     Exercise Yes     Seat Belt No     Self-Exams Yes     Social History Narrative          Specific Social Hx:       Health Insurance Status: Yes (working on it)       Employment Status: Full time  Occupation: optical position                        Living Arrangements: lives with their son and daughter        Social Support: Yes       Presence of increased risk for disease transmission behaviors as defined by PHS guidelines: No        Allergies:  Allergies   Allergen Reactions     Nka [No Known Allergies]        Medications:  Prior to Admission medications    Medication Sig Start Date End Date Taking? Authorizing Provider   acetaminophen-codeine (TYLENOL WITH CODEINE #3) 300-30 MG per tablet Take 1-2 tablets by mouth every 6 hours as needed for pain  Patient not taking: Reported on 9/13/2018 7/9/18   Andrea Whitley MD       Vitals:  Vital Signs 9/13/2018 9/13/2018 9/13/2018   Systolic 124 128 136   Diastolic 83 84 85   Pulse 69 - -   Temperature 97.5 - -   Respirations - - -   Weight (LB) 110 lb - -   Height 5' 1\" - -   BMI (Calculated) 20.83 - -   Pain - - -   O2 100 - -       Exam:   GENERAL APPEARANCE: alert and no distress  HENT: mouth without ulcers or lesions  LYMPHATICS: no cervical or supraclavicular " nodes  RESP: lungs clear to auscultation - no rales, rhonchi or wheezes  CV: regular rhythm, normal rate, no rub, no murmur  EDEMA: no LE edema bilaterally  ABDOMEN: soft, nondistended, nontender, bowel sounds normal  MS: extremities normal - no gross deformities noted, no evidence of inflammation in joints, no muscle tenderness  SKIN: no rash    Results:   Labs and imaging were ordered for this visit and reviewed by me.  Recent Results (from the past 336 hour(s))   ABO/Rh type and screen    Collection Time: 09/13/18  7:30 AM   Result Value Ref Range    ABO B     RH(D) Pos     Antibody Screen Neg     Test Valid Only At          [facility]    Specimen Expires 09/16/2018    Lipid Profile    Collection Time: 09/13/18  7:30 AM   Result Value Ref Range    Cholesterol 261 (H) <200 mg/dL    Triglycerides 98 <150 mg/dL    HDL Cholesterol 62 >49 mg/dL    LDL Cholesterol Calculated 180 (H) <100 mg/dL    Non HDL Cholesterol 199 (H) <130 mg/dL   Comprehensive metabolic panel    Collection Time: 09/13/18  7:30 AM   Result Value Ref Range    Sodium 138 133 - 144 mmol/L    Potassium 4.0 3.4 - 5.3 mmol/L    Chloride 104 94 - 109 mmol/L    Carbon Dioxide 30 20 - 32 mmol/L    Anion Gap 4 3 - 14 mmol/L    Glucose 102 (H) 70 - 99 mg/dL    Urea Nitrogen 12 7 - 30 mg/dL    Creatinine 0.84 0.52 - 1.04 mg/dL    GFR Estimate 73 >60 mL/min/1.7m2    GFR Estimate If Black 88 >60 mL/min/1.7m2    Calcium 9.1 8.5 - 10.1 mg/dL    Bilirubin Total 0.6 0.2 - 1.3 mg/dL    Albumin 4.3 3.4 - 5.0 g/dL    Protein Total 7.8 6.8 - 8.8 g/dL    Alkaline Phosphatase 65 40 - 150 U/L    ALT 24 0 - 50 U/L    AST 18 0 - 45 U/L   Phosphorus    Collection Time: 09/13/18  7:30 AM   Result Value Ref Range    Phosphorus 3.6 2.5 - 4.5 mg/dL   Hemoglobin A1c    Collection Time: 09/13/18  7:30 AM   Result Value Ref Range    Hemoglobin A1C 5.5 0 - 5.6 %   INR    Collection Time: 09/13/18  7:30 AM   Result Value Ref Range    INR  0.95 0.86 - 1.14   Partial thromboplastin time    Collection Time: 09/13/18  7:30 AM   Result Value Ref Range    PTT 34 22 - 37 sec   CBC with platelets    Collection Time: 09/13/18  7:30 AM   Result Value Ref Range    WBC 6.5 4.0 - 11.0 10e9/L    RBC Count 5.19 3.8 - 5.2 10e12/L    Hemoglobin 15.3 11.7 - 15.7 g/dL    Hematocrit 47.3 (H) 35.0 - 47.0 %    MCV 91 78 - 100 fl    MCH 29.5 26.5 - 33.0 pg    MCHC 32.3 31.5 - 36.5 g/dL    RDW 12.1 10.0 - 15.0 %    Platelet Count 301 150 - 450 10e9/L   Uric acid    Collection Time: 09/13/18  7:30 AM   Result Value Ref Range    Uric Acid 3.1 2.6 - 6.0 mg/dL   Protein  random urine with Creat Ratio    Collection Time: 09/13/18  7:46 AM   Result Value Ref Range    Protein Random Urine <0.05 g/L    Protein Total Urine g/gr Creatinine Unable to calculate due to low value 0 - 0.2 g/g Cr   Routine UA with microscopic    Collection Time: 09/13/18  7:46 AM   Result Value Ref Range    Color Urine Straw     Appearance Urine Clear     Glucose Urine Negative NEG^Negative mg/dL    Bilirubin Urine Negative NEG^Negative    Ketones Urine Negative NEG^Negative mg/dL    Specific Gravity Urine 1.006 1.003 - 1.035    Blood Urine Negative NEG^Negative    pH Urine 7.0 5.0 - 7.0 pH    Protein Albumin Urine Negative NEG^Negative mg/dL    Urobilinogen mg/dL 0.0 0.0 - 2.0 mg/dL    Nitrite Urine Negative NEG^Negative    Leukocyte Esterase Urine Negative NEG^Negative    Source Midstream Urine     WBC Urine 1 0 - 5 /HPF    RBC Urine 1 0 - 2 /HPF    Bacteria Urine Few (A) NEG^Negative /HPF    Squamous Epithelial /HPF Urine 1 0 - 1 /HPF    Mucous Urine Present (A) NEG^Negative /LPF   Albumin Random Urine Quantitative with Creat Ratio    Collection Time: 09/13/18  7:46 AM   Result Value Ref Range    Creatinine Urine 40 mg/dL    Albumin Urine mg/L <5 mg/L    Albumin Urine mg/g Cr Unable to calculate due to low value 0 - 25 mg/g Cr           [facility]

## 2018-09-13 NOTE — PROGRESS NOTES
Psychosocial Evaluation  Living Organ Donation per OPTN Policy 14.1.A  Organ Type: unrelated, kidney  Presenting Information:  Ms. Eugenia Earl presents to the [facility] to complete a psychosocial evaluation since she is interested in becoming a kidney donor to her friend, . Elton Garcia, age 65.  Ms. Earl is a 48 year old, single, white, American, female.  She is a [place] Citizen.  She is in clinic today with her good friend, and sister-in-law to the recipient.  I evaluated her alone for this assessment.    PERSONAL BACKGROUND:  Current Living Situation: Ms. Earl and her two children, ages 22 and 10, live in a single family home in [place], [place].  It is a 4 hour drive from the [facility].    Education/Employment/Financial Situation: Ms. Earl completed a high school.  She attended some college in [place], [place] to learn  skills, but did not complete a degree program.  She lives with her two children, a son age 22, and a daughter age 10.  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  She does not have paid time off or short term disability insurance.  Her employer is quite small, so the federal FMLA laws do not apply.  She has health care insurance.  She reports that her employer is supportive of her desire to be a kidney donor and would allow her the time away from work.  Her potential recipient is enrolled in the lost wage study and Ms. Earl is assigned to the treatment arm of this study, so if she were to be approved and want to go forward with a surgery, she could have her lost wages reimbursed.  Her household size is 3 and her annual household income is $19,968 gross.  She earns $12.00 per hour and works 32 hours per week.  Ms. Earl had health care insurance.  She does not have short term disability, but does have some paid time off.  She may also qualify for [facility] to assist her with travel expenses if she were to be an  "approved donor and her recipient's household income is within the program's guidelines.      Family History: Ms. Earl is single, never .  She has two children, a son age 22, and a daughter, age 10.  Her parents are both still living and still .  They live next door to her.    General Health: Ms. Earl reports that she is in good health.  However, she admits that she does not eat very well and sometimes doesn't eat at all for a day or two.  She reports that her cholesterol labs are high today.    Mental Health: Ms. Earl reports that 25 years ago, she had a suicide attempt.  She was 23 at the time.  She was hospitalized and treated for anxiety or possibly bipolar disorder.   She reports that she was started on a mood stabilizer at that time, but did not remember which one.  She also had some outpatient psychotherapy.  She did not like how the medication made her feel, so she did not stay on it for very long.  She reports that she smokes marijuana 3 to 4 times per week, and this helps her \"stay calm\".  She admits that this might not be the best \"treatment\" for her anxiety, but that it has worked for her for a long time.  She also has developed a tight knit support system of her parents, who live next door to her, and her closest friends.  She denies any hospitalizations since the suicide attempt 25 years ago.  She denies any struggles since then with suicidal thinking, plans or more recent attempts.  Ms. Earl denies any current need for psychotherapy.      Alcohol and Drug Use/Abuse/Dependency: Ms. Earl reports that she consumes alcohol very rarely.  She estimates that she has 3 to 4 serving per month at most.  She does use marijuana 3 to 4 times per week to \"calm down and relax\".  She denies any negative consequences of her use, such as difficulty parenting her 10 year old daughter or working nearly full time.  She has never had any formal treatment for substance abuse and denies any DUI " history.    Cigarette Use: Mr. Earl quit smoking in 1999.  She had smoked 1/4 pack per day for 3 years prior to her quitting.    Legal: Ms. Earl denies any legal issues.    Coping Strategies/Support System: Ms. Earl reports that she carli with stress by spending time with her parents and friends.  She states that her good friend, Wendi, who is with her today, is very supportive.  Wendi is the sister-in-law to the potential kidney recipient, Rodolfo Garcia.  Wendi is the sister of Rodolfos wife, Sophia.  Ms. Earl would have support from her 22 year old son and her parents who live next door.    DONOR SPECIFIC INFORMATION:  Relationship to Recipient: Ms. Earl wants to donate a kidney to her friend, . Elton Garcia, age 65.    Decision Process/Motivation to Donate: Ms. Ealr wants to donate a kidney to Mr. Garcia so that he can live a longer, healthier life free from dialysis.  She states that Rodolfo and his wife, Sophia, have been very good friends to her over the years.  She denies feeling any pressure or coercion.  She denies being offered any payment to be a donor.      PREPARATION FOR DONATION, RECOVERY, AND POTENTIAL SHORT-LONG-TERM OUTCOMES:  Understanding of the Living Donation Process:   We discussed the role of the donor  and Independent Donor Advocate.  Short and long term medical and psychosocial risks to both, donor and recipient were reviewed and she voices her understanding of these risks.  High risk behaviors as defined by [place] Public Health Services (PHS) 2013 that have potential to increase risk of disease transmission were reviewed and she denies any high risk behaviors. Post surgical restrictions (2 weeks no driving, 6 weeks no lifting over 10 lbs) were reviewed and she appears capable of adhering to the post surgical requirements. The need for a caregiver was discussed and she has her parents, adult son, and close friend to assist her with her post surgical care needs .  The risk of poor  psychosocial outcome including problems with body image, post-surgery depression or anxiety, or feelings of emotional distress or bereavement if recipient experiences any recurrent disease, poor outcome or death was reviewed.  Additionally, potential financial implications, including the risk of having difficulty obtaining health care insurance, life insurance, disability insurance, or long term care insurance were reviewed, as were available donor grants to assist with donor related expenses.      We also discussed some unique issues that arise with paired kidney donation, which include the uncertainty of the timing and the importance of having a employment situation and support system that is able to provide sustained support and flexibility.    Ms. Earl appears capable of understanding this information and making an informed medical decision.    Impressions/Recommendations:   Ms. Earl  is highly motivated to donate kidney  to her close friend, Mr. Elton Garcia, age 67.  Her decision to donate is free of inducement, coercion, or other undue pressure.   Her housing and employment are stable.  However, she earns very little money and supports her 10 year old daughter on less than $20,000 per year.  She would have wage reimbursement through the [facility] study, but just for 2 to 3 weeks post.  This may leave her quite financially vulnerable if she were to require more time off from work due to unforseen complications.  The need for a caregiver was reviewed and she is able to identify a plan to meet her post operative care needs.  She appears capable of making an informed medical decision.      Ms. Earl reports that she has struggled with anxiety for years.  She has one suicide attempt when she was 23 years old, which was 25 years ago.  She reports that at the time of her suicide attempt, she was hospitalized.  The doctors were considering bipolar disorder may have been a possible diagnosis, but Ms. Earl did not  "seek out any ongoing mental health care and treatment past the first year or so post her suicide attempt.  Ms. Earl reports that she smokes marijuana 3 to 4 times per week which helps her \"stay calm\".  She admits that this might not be the best \"treatment\" for her anxiety, but that it has worked for her for a long time.  She tells me she is reluctant to take other medications, based on her experience 25 years ago.  She has developed a tight knit support system of her parents, who live next door to her, and her closest friends.  She relies on them for emotional support.  Ms. Earl denies any negative consequences of her use of marijuana, such as inability to parent her 10 year old daughter or problems with her employer.  I think Ms. Earl would benefit from a formal psychiatric assessment and recommendations for treatment of her mood illness, prior to being an approved kidney donor.  She is quite motivated to be a donor for Mr. Garcia, and said that she worries that she might not be able to for medical and/or psychosocial reasons.  It is unclear if the problems with her mood would be exacerbated by kidney donation.  If she were to have any issues with her recovery, she would be at a higher risk of not only mood problems, but also financial problems.  For these reasons, I would not recommend that she be a kidney donor for Mr. Garcia.         Contact Information:   RUDY Mcelroy, [facility]  Clinical  and Independent Donor Advocate  [facility]  Pager:  [phone]  Direct:  [phone]    Time Spent: 60 minutes      Living Kidney Donor Consent per OPTN Policy 14.3.A for Independent Living Donor Advocate (HALEY)    Written assurance has been obtained from the potential donor that he/she:   Is willing to donate  Is free from inducement and coercion  Has been informed that the he/she may decline to donate at any time  Has been informed that transplant centers must: "   A) Offer donors an opportunity to discontinue the donor consent or evaluation process in a way that is protected and confidential  B) Provide an independent living donor advocate (HALEY) to assist the potential donor during this process    The following was presented to the potential donor in a language in which the potential donor is able to engage in meaningful dialogue:   Education and instruction about all phases of the living donation process including:   Consent  Medical and psychosocial evaluations  Pre and post operative care  Required post operative follow up  Disclosure that the [facility] will take all reasonable precautions to provide confidentiality for the donor/recipient  Disclosure that it is a federal crime for any person to knowingly acquire, obtain or otherwise transfer any human organ for valuable consideration  Disclosure that the [facility] must provide an independent living donor advocate (HALEY)  Disclosure that health information obtained during the evaluation is subject to the same regulations as all records and could reveal conditions that must be reported to local, state, or federal public health authorities  Disclosure that the [facility] is required to report living donor follow up information at 6 months, 1 year, and 2 years, and that the potential donor must commit to post operative follow up testing coordinated by the [facility]    Disclosure has been provided that these risks may be transient or permanent & include but are not limited to:  Potential psychosocial risks:  Problems with body image  Post-surgery depression or anxiety  Feelings of emotional distress or bereavement if recipient experiences any recurrent disease or in the event of the recipient s death  Impact of donation on the donor s lifestyle    Potential financial impacts:  Personal expenses of travel, housing, , lost wages related to donation might not be reimbursed. However,  resources may be available to defray some donation-related expenses   Need for life-long follow up at the donor s expense  Loss of employment or income  Negative impact on the ability to obtain future employment  Negative impact on the ability to obtain, maintain, or afford health, disability, and life insurance  Future health problems experienced by living donors following donation may not be covered by the recipient s insurance    Contact Information:  RUDY Mcelroy, [facility]  Clinical  and Independent Donor Advocate  [facility]  Pager:  [phone]  Direct:  [phone]    Time Spent: 60 minutes

## 2018-09-13 NOTE — PROGRESS NOTES
Donor Iohexol test    Eugenia Earl presents today to [facility] for a Donor Iohexol test.      Progress note:  ID verified by name and .     The following information was verified with the patient:  Female Patients is there any possibility of being pregnant No  Is there a history of allergy (skin rash, swelling, ect) to:   A.  Iodine (except skin reactions to betadine): No   B. Intravenous radio-contrast agents: No   C. Seafood No    R.N. provided patient with educational handout regarding timed test. Yes    20 gauge PIV placed in right AC.  Iohexol administered through separate vein via butterfly stick. PIV left in place for blood draws and CT this afternoon    Medication administered:  Iohexol (Omnipaque 300mg iodine/ml concentration) 5 mls.    Start time: 0750  Stop time: 075    Drug Waste Record    Drug Name: iohexol  Dose: 5ml  Route administered: IV  NDC #: 0403-1413-10  Amount of waste(mL): 5 mls  Reason for waste: Single use vial    Administrations This Visit     iohexol (OMNIPAQUE 300) injection 5 mL     Admin Date Action Dose Route Administered By             2018 Given 5 mL Intravenous Tanna Storm, VANESSA                              Evaluation nurse in transplant to draw labs at 2 and 4 hours post iohexol administration.  Patient given a slip with the times to get labs drawn and verbalized understanding of the plan.    Patient tolerated the procedure:  Yes    After the infusion patient was discharged to the next appointment.    Tanna Storm RN

## 2018-09-13 NOTE — NURSING NOTE
"Chief Complaint   Patient presents with     Transplant Donor Evaluation     Donor eval     /85  Pulse 69  Temp 97.5  F (36.4  C) (Oral)  Ht 1.549 m (5' 1\")  Wt 49.9 kg (110 lb)  LMP 08/10/2017 (Approximate)  SpO2 100%  BMI 20.78 kg/m2  MARY JANE TRINIDAD CMA    "

## 2018-09-13 NOTE — MR AVS SNAPSHOT
After Visit Summary   9/13/2018    Eugenia Earl    MRN: [id number]           Patient Information     Date Of Birth          1970        Visit Information        Provider Department      9/13/2018 10:15 AM Betina Mendoza MD [facility]        Today's Diagnoses     Kidney donor    -  1       Follow-ups after your visit        Your next 10 appointments already scheduled     Sep 13, 2018 11:30 AM CDT   (Arrive by 11:15 AM)   SOT CARE COORDINATOR EVAL with Bethany Chaudhary RN   [facility] ([facility])    [address]  [address]  [place] [address]   [phone]            Sep 13, 2018 12:15 PM CDT   (Arrive by 12:00 PM)   [facility]    [address]  [address]  [place] [address]   [phone]            Sep 13, 2018  1:40 PM CDT   CTA RENAL WITH CONTRAST with UCCT2   [facility] CT ([facility])    [address]  [address]  [place] [address]   [phone]           How do I prepare for my exam? (Food and drink instructions) **You will have contrast for this exam.** Do not eat or drink for 2 hours before your exam. If you need to take medicine, you may take it with small sips of water. (We may ask you to take liquid medicine as well.)  The day before your exam, drink extra fluids at least six 8-ounce glasses (unless your doctor tells you to restrict your fluids).  How do I prepare for my exam? (Other instructions) Patients over 70 or patients with diabetes or kidney problems: If you haven t had a blood test (creatinine test) within the last 30 days, the Cardiologist/Radiologist may require you to get this test prior to your exam.  What should I wear: Please wear loose clothing, such as a sweat suit or jogging clothes.  Avoid snaps,  zippers and other metal. We may ask you to undress and put on a hospital gown.  How long does the exam take: Most scans take less than 20 minutes.  What should I bring: Please bring any scans or X-rays taken at other hospitals, if similar tests were done. Also bring a list of your medicines, including vitamins, minerals and over-the-counter drugs. It is safest to leave personal items at home.  Do I need a :  No  is needed.  What do I need to tell my doctor? Be sure to tell your doctor: * If you have any allergies. * If there s any chance you are pregnant. * If you are breastfeeding. * If you have diabetes as your medication may need to be adjusted for this exam.  What should I do after the exam: No restrictions, You may resume normal activities.  What is this test: A CT (computed tomography) scan is a series of pictures that allows us to look inside your body. The scanner creates images of the body in cross sections, much like slices of bread. This helps us see any problems more clearly. You may receive contrast (X-ray dye) before or during your scan. Contrast is given through an IV (small needle in your arm).  Who should I call with questions: If you have any questions, please call the Imaging Department where you will have your exam. Directions, parking instructions, and other information is available on our website, [email or web address].            Sep 13, 2018  2:00 PM CDT   ecg with  CV EKG   [facility] ([facility])    [address] [address] [phone]            Sep 13, 2018  2:30 PM CDT   XR CHEST 2 VIEWS with UCXR1   [facility] ([facility])    [address] [id number] [phone]           How do I prepare for my exam? (Food and drink instructions) No Food and Drink Restrictions.  How do I prepare for my exam? (Other instructions) You  do not need to do anything special for this exam.  What should I wear: Wear comfortable clothes.  How long does the exam take: Most scans take less than 5 minutes.  What should I bring: Bring a list of your medicines, including vitamins, minerals and over-the-counter drugs. It is safest to leave personal items at home.  Do I need a :  No  is needed.  What do I need to tell my doctor: Tell your doctor if there s any chance you are pregnant.  What should I do after the exam: No restrictions, You may resume normal activities.  What is this test: An image of a specific body part shown in shades of black and white.  Who should I call with questions: If you have any questions, please call the Imaging Department where you will have your exam. Directions, parking instructions, and other information is available on our website, [email or web address].              Who to contact     If you have questions or need follow up information about today's clinic visit or your schedule please contact [facility] directly at [phone].  Normal or non-critical lab and imaging results will be communicated to you by MyChart, letter or phone within 4 business days after the clinic has received the results. If you do not hear from us within 7 days, please contact the clinic through [facility] or phone. If you have a critical or abnormal lab result, we will notify you by phone as soon as possible.  Submit refill requests through [employer] or call your pharmacy and they will forward the refill request to us. Please allow 3 business days for your refill to be completed.          Additional Information About Your Visit        [facility] Information     [employer] gives you secure access to your electronic health record. If you see a primary care provider, you can also send messages to your care team and make appointments. If you have questions, please call your primary care clinic.  If you do not have a primary care  provider, please call [phone] and they will assist you.        Care EveryWhere ID     This is your Care EveryWhere ID. This could be used by other organizations to access your [place] medical records  [id number]        Your Vitals Were     Last Period                   08/10/2017 (Approximate)            Blood Pressure from Last 3 Encounters:   09/13/18 136/85   07/09/18 142/96   01/02/18 153/83    Weight from Last 3 Encounters:   09/13/18 49.9 kg (110 lb)   01/02/18 54.4 kg (120 lb)   11/10/17 54.4 kg (120 lb)              We Performed the Following     HCL THC QT URINE        Primary Care Provider Office Phone # Fax #    Yan Villegas [phone]       [facility] [place] [address]  [place] [address]        Equal Access to Services     [facility]: Hadii elis black hadasho Soomaali, waaxda luqadaha, qaybta kaalmada adequinyada, aditi parra . So [facility] [phone].    ATENCIÓN: Si habla español, tiene a mendoza disposición servicios gratuitos de asistencia lingüística. Ruchi al [phone].    We comply with applicable federal civil rights laws and [place] laws. We do not discriminate on the basis of race, color, national origin, age, disability, sex, sexual orientation, or gender identity.            Thank you!     Thank you for choosing [facility]  for your care. Our goal is always to provide you with excellent care. Hearing back from our patients is one way we can continue to improve our services. Please take a few minutes to complete the written survey that you may receive in the mail after your visit with us. Thank you!             Your Updated Medication List - Protect others around you: Learn how to safely use, store and throw away your medicines at www.disposemymeds.org.          This list is accurate as of 9/13/18 11:02 AM.  Always use your most recent med list.                   Brand Name Dispense Instructions for use  Diagnosis    acetaminophen-codeine 300-30 MG per tablet    TYLENOL WITH CODEINE #3    12 tablet    Take 1-2 tablets by mouth every 6 hours as needed for pain

## 2018-09-13 NOTE — PROGRESS NOTES
[facility]  Consult Note     Medical record number: [id number]  YOB: 1970,     Date of Visit:  09/13/2018  Consult requested by the patient for evaluation of kidney donation candidacy.    Assessment and Recommendations: Ms. Earl appears to be a good candidate for kidney donation at this point in the evaluation. The following issues will need to be addressed prior to formal review:  47 yo healthy kidney donor  H/O hysterectomy - laparoscopic vaginal  Recreational marijuana use biweekly  Herniated disc L4-5 had surgery 2 years ago  Wants bikini line incision  Will depend on anatomy  Risks of the surgical procedure including but not limited to the rare risk of mortality discussed in detail. Patient verbalized good understanding and had several pertinent questions which were answered satisfactorily.           The majority of our visit today was spent in counseling regarding the medical and surgical risks of kidney donation, the typical valerie-and post-operative experience and recovery/return to work pattern.  We also talked about post-op visits and longer term health care maintenance, as well as the implications of having one remaining kidney. This discussion included, but was not limited to rates of complications such as bleeding, infection, need for transfusion, reoperation, other organ injury, future bowel obstruction, incisional hernia, port site pain, varicocele, venous thrombosis, pulmonary embolism, renal failure, and death (3 per 10,000). At the conclusion of the visit, all questions had been answered and Ms. Earl's candidacy for donation will be reviewed at our Multidisciplinary Donor Selection Committee.     Total time: 45 minutes  Counselling time: 30 minutes    .    ---------------------------------------------------------------------------------------------------    HPI: Ms. Earl wishes to donate a kidney to friend's brother in law.           NO  Personal  history of cancer    []         Comment:     Personal history of kidney problems   []         Comment:   Personal history of diabetes    []         Comment:                  Bladder emptying problems (prostate, urinary retention) []         Comment:   Neck or Back problems:     []         Comment:   Constipation      []         Comment:       Frequent NSAID use:         []         Comment:       Other:        []         Comment:                Past Medical History:   Diagnosis Date     Dysmenorrhea 04/16/2001     Endometriosis of uterus 05/10/2001     Endometriosis, site unspecified 06/18/2001     Follow-up examination, following other surgery 10/01/2008     Follow-up examination, following unspecified surgery 04/19/2001     Nonallopathic lesion of cervical region, not elsewhere classified 12/19/2001     Past Surgical History:   Procedure Laterality Date     LAMINECTOMY LUMBAR POSTERIOR MICROSCOPIC ONE LEVEL N/A 9/30/2015    Procedure: LAMINECTOMY LUMBAR POSTERIOR MICROSCOPIC ONE LEVEL;  Surgeon: Danyel Kaba MD;  Location: [facility]     LAPAROSCOPIC ASSISTED HYSTERECTOMY VAGINAL, BILATERAL SALPINGO-OOPHORECTOMY, COMBINED Left 9/27/2017    Procedure: COMBINED LAPAROSCOPIC ASSISTED HYSTERECTOMY VAGINAL, SALPINGO-OOPHORECTOMY;  LAPAROSCOPIC ASSISTED VAGINAL HYSTERECTOMY, BILATERAL SALPINGECTOMY, LEFT OOPHORECTOMY, Cystoscopy;  Surgeon: Chapo Szymanski MD;  Location: [facility]     TONSILLECTOMY       TUBAL LIGATION       Family History   Problem Relation Age of Onset     Diabetes Mother      Coronary Artery Disease Father      Hyperlipidemia Father      Social History     Social History     Marital status: Single     Spouse name: N/A     Number of children: N/A     Years of education: N/A     Occupational History     Not on file.     Social History Main Topics     Smoking status: Former Smoker     Smokeless tobacco: Never Used     Alcohol use Yes      Comment: occasionally     Drug use: No     Sexual activity: Yes      Partners: Male     Other Topics Concern      Service No     Blood Transfusions No     Caffeine Concern No     Occupational Exposure No     Hobby Hazards No     Sleep Concern No     Stress Concern No     Weight Concern No     Special Diet No     Back Care Yes     Exercise Yes     Seat Belt No     Self-Exams Yes     Social History Narrative       ROS:   CONSTITUTIONAL:  No fevers or chills  EYES: negative for icterus  ENT:  negative for hearing loss, tinnitus and sore throat  RESPIRATORY:  negative for cough, sputum, dyspnea  CARDIOVASCULAR:  negative for chest pain  GASTROINTESTINAL:  negative for nausea, vomiting, diarrhea or constipation  GENITOURINARY:  negative for incontinence, dysuria, bladder emptying problems  HEME:  No easy bruising  INTEGUMENT:  negative for rash and pruritus  NEURO:  Negative for headache, seizure disorder    Allergies:   Allergies   Allergen Reactions     Nka [No Known Allergies]        Medications:  Prescription Medications as of 9/13/2018             acetaminophen-codeine (TYLENOL WITH CODEINE #3) 300-30 MG per tablet Take 1-2 tablets by mouth every 6 hours as needed for pain      Facility Administered Medications as of 9/13/2018             iohexol (OMNIPAQUE 300) injection 5 mL Inject 5 mLs into the vein once        Exam:   Temp:  [97.5  F (36.4  C)] 97.5  F (36.4  C)  Pulse:  [69] 69  BP: (124-136)/(83-85) 136/85  SpO2:  [100 %] 100 %  Appearance: in no apparent distress.   Skin: normal  Head and Neck: Normal, no rashes or jaundice  Respiratory: normal respiratory excursions, no audible wheeze  Cardiovascular: RRR  Abdomen: flat, No distention   Extremeties: Edema, none  Neuro: without deficit       Labs:   ABO: B  Chemistries:   Recent Labs   Lab Test  09/13/18   0730  07/09/18   1930   NA  138  141   POTASSIUM  4.0  3.5   CHLORIDE  104  106   CO2  30  29   ANIONGAP  4  6   GLC  102*  85   BUN  12  12   CR  0.84  0.77   FLASH  9.1  9.0       Urine Studies:   Recent Labs   Lab  Test  09/13/18   0746  11/02/16   1708   COLOR  Straw  Yellow   APPEARANCE  Clear  Slightly Cloudy   URINEGLC  Negative  Negative   URINEBILI  Negative  Negative   URINEKETONE  Negative  5*   SG  1.006  1.021   UBLD  Negative  Negative   URINEPH  7.0  6.0   PROTEIN  Negative  10*   NITRITE  Negative  Negative   LEUKEST  Negative  Negative   RBCU  1  1   WBCU  1  2     Recent Labs   Lab Test  09/13/18   0746   UTPG  Unable to calculate due to low value   MICROL  <5       Hematology:      Recent Labs   Lab Test  09/13/18   0730   WBC  6.5   RBC  5.19   HGB  15.3   HCT  47.3*   MCV  91   MCH  29.5   MCHC  32.3   RDW  12.1   PLT  301       Coags:   Recent Labs   Lab Test  09/13/18   0730   INR  0.95       Lipid Profile:   Cholesterol   Date Value Ref Range Status   09/13/2018 261 (H) <200 mg/dL Final     Comment:     Desirable:       <200 mg/dl     Triglycerides   Date Value Ref Range Status   09/13/2018 98 <150 mg/dL Final     HDL Cholesterol   Date Value Ref Range Status   09/13/2018 62 >49 mg/dL Final     LDL Cholesterol Calculated   Date Value Ref Range Status   09/13/2018 180 (H) <100 mg/dL Final     Comment:     Above desirable:  100-129 mg/dl  Borderline High:  130-159 mg/dL  High:             160-189 mg/dL  Very high:       >189 mg/dl       Non HDL Cholesterol   Date Value Ref Range Status   09/13/2018 199 (H) <130 mg/dL Final     Comment:     Above Desirable:  130-159 mg/dl  Borderline high:  160-189 mg/dl  High:             190-219 mg/dl  Very high:       >219 mg/dl         Virals:  CMV and EBV pending.   No lab results found.   No results found for: HCVAB, HQTG, HCGENO, HCPCR, HQTRNA, HEPRNA, CRYOG    No results found for: HCVAB, HBSAB, HBSAG

## 2018-09-13 NOTE — LETTER
9/13/2018       RE: Eugenia Earl  [address] [place] [address]     Dear Colleague,    Thank you for referring your patient, Eugenia Earl, to the [facility]ID ORGAN TRANSPLANT at [facility]. Please see a copy of my visit note below.    [facility]  Consult Note     Medical record number: [id number]  YOB: 1970,     Date of Visit:  09/13/2018  Consult requested by the patient for evaluation of kidney donation candidacy.    Assessment and Recommendations: Ms. Earl appears to be a good candidate for kidney donation at this point in the evaluation. The following issues will need to be addressed prior to formal review:  47 yo healthy kidney donor  H/O hysterectomy - laparoscopic vaginal  Recreational marijuana use biweekly  Herniated disc L4-5 had surgery 2 years ago  Wants bikini line incision  Will depend on anatomy  Risks of the surgical procedure including but not limited to the rare risk of mortality discussed in detail. Patient verbalized good understanding and had several pertinent questions which were answered satisfactorily.           The majority of our visit today was spent in counseling regarding the medical and surgical risks of kidney donation, the typical valerie-and post-operative experience and recovery/return to work pattern.  We also talked about post-op visits and longer term health care maintenance, as well as the implications of having one remaining kidney. This discussion included, but was not limited to rates of complications such as bleeding, infection, need for transfusion, reoperation, other organ injury, future bowel obstruction, incisional hernia, port site pain, varicocele, venous thrombosis, pulmonary embolism, renal failure, and death (3 per 10,000). At the conclusion of the visit, all questions had been answered and Ms. Earl's candidacy for donation will be reviewed at our Multidisciplinary Donor  Selection Committee.     Total time: 45 minutes  Counselling time: 30 minutes    .    ---------------------------------------------------------------------------------------------------    HPI: Ms. Earl wishes to donate a kidney to friend's brother in law.           NO  Personal history of cancer    []         Comment:     Personal history of kidney problems   []         Comment:   Personal history of diabetes    []         Comment:                  Bladder emptying problems (prostate, urinary retention) []         Comment:   Neck or Back problems:     []         Comment:   Constipation      []         Comment:       Frequent NSAID use:         []         Comment:       Other:        []         Comment:                Past Medical History:   Diagnosis Date     Dysmenorrhea 04/16/2001     Endometriosis of uterus 05/10/2001     Endometriosis, site unspecified 06/18/2001     Follow-up examination, following other surgery 10/01/2008     Follow-up examination, following unspecified surgery 04/19/2001     Nonallopathic lesion of cervical region, not elsewhere classified 12/19/2001     Past Surgical History:   Procedure Laterality Date     LAMINECTOMY LUMBAR POSTERIOR MICROSCOPIC ONE LEVEL N/A 9/30/2015    Procedure: LAMINECTOMY LUMBAR POSTERIOR MICROSCOPIC ONE LEVEL;  Surgeon: Danyel Kaba MD;  Location: [facility]     LAPAROSCOPIC ASSISTED HYSTERECTOMY VAGINAL, BILATERAL SALPINGO-OOPHORECTOMY, COMBINED Left 9/27/2017    Procedure: COMBINED LAPAROSCOPIC ASSISTED HYSTERECTOMY VAGINAL, SALPINGO-OOPHORECTOMY;  LAPAROSCOPIC ASSISTED VAGINAL HYSTERECTOMY, BILATERAL SALPINGECTOMY, LEFT OOPHORECTOMY, Cystoscopy;  Surgeon: Chapo Szymanski MD;  Location: [facility]     TONSILLECTOMY       TUBAL LIGATION       Family History   Problem Relation Age of Onset     Diabetes Mother      Coronary Artery Disease Father      Hyperlipidemia Father      Social History     Social History     Marital status: Single     Spouse name: N/A      Number of children: N/A     Years of education: N/A     Occupational History     Not on file.     Social History Main Topics     Smoking status: Former Smoker     Smokeless tobacco: Never Used     Alcohol use Yes      Comment: occasionally     Drug use: No     Sexual activity: Yes     Partners: Male     Other Topics Concern      Service No     Blood Transfusions No     Caffeine Concern No     Occupational Exposure No     Hobby Hazards No     Sleep Concern No     Stress Concern No     Weight Concern No     Special Diet No     Back Care Yes     Exercise Yes     Seat Belt No     Self-Exams Yes     Social History Narrative       ROS:   CONSTITUTIONAL:  No fevers or chills  EYES: negative for icterus  ENT:  negative for hearing loss, tinnitus and sore throat  RESPIRATORY:  negative for cough, sputum, dyspnea  CARDIOVASCULAR:  negative for chest pain  GASTROINTESTINAL:  negative for nausea, vomiting, diarrhea or constipation  GENITOURINARY:  negative for incontinence, dysuria, bladder emptying problems  HEME:  No easy bruising  INTEGUMENT:  negative for rash and pruritus  NEURO:  Negative for headache, seizure disorder    Allergies:   Allergies   Allergen Reactions     Nka [No Known Allergies]        Medications:  Prescription Medications as of 9/13/2018             acetaminophen-codeine (TYLENOL WITH CODEINE #3) 300-30 MG per tablet Take 1-2 tablets by mouth every 6 hours as needed for pain      Facility Administered Medications as of 9/13/2018             iohexol (OMNIPAQUE 300) injection 5 mL Inject 5 mLs into the vein once        Exam:   Temp:  [97.5  F (36.4  C)] 97.5  F (36.4  C)  Pulse:  [69] 69  BP: (124-136)/(83-85) 136/85  SpO2:  [100 %] 100 %  Appearance: in no apparent distress.   Skin: normal  Head and Neck: Normal, no rashes or jaundice  Respiratory: normal respiratory excursions, no audible wheeze  Cardiovascular: RRR  Abdomen: flat, No distention   Extremeties: Edema, none  Neuro: without deficit        Labs:   ABO: B  Chemistries:   Recent Labs   Lab Test  09/13/18   0730  07/09/18   1930   NA  138  141   POTASSIUM  4.0  3.5   CHLORIDE  104  106   CO2  30  29   ANIONGAP  4  6   GLC  102*  85   BUN  12  12   CR  0.84  0.77   FLASH  9.1  9.0       Urine Studies:   Recent Labs   Lab Test  09/13/18   0746  11/02/16   1708   COLOR  Straw  Yellow   APPEARANCE  Clear  Slightly Cloudy   URINEGLC  Negative  Negative   URINEBILI  Negative  Negative   URINEKETONE  Negative  5*   SG  1.006  1.021   UBLD  Negative  Negative   URINEPH  7.0  6.0   PROTEIN  Negative  10*   NITRITE  Negative  Negative   LEUKEST  Negative  Negative   RBCU  1  1   WBCU  1  2     Recent Labs   Lab Test  09/13/18   0746   UTPG  Unable to calculate due to low value   MICROL  <5       Hematology:      Recent Labs   Lab Test  09/13/18   0730   WBC  6.5   RBC  5.19   HGB  15.3   HCT  47.3*   MCV  91   MCH  29.5   MCHC  32.3   RDW  12.1   PLT  301       Coags:   Recent Labs   Lab Test  09/13/18   0730   INR  0.95       Lipid Profile:   Cholesterol   Date Value Ref Range Status   09/13/2018 261 (H) <200 mg/dL Final     Comment:     Desirable:       <200 mg/dl     Triglycerides   Date Value Ref Range Status   09/13/2018 98 <150 mg/dL Final     HDL Cholesterol   Date Value Ref Range Status   09/13/2018 62 >49 mg/dL Final     LDL Cholesterol Calculated   Date Value Ref Range Status   09/13/2018 180 (H) <100 mg/dL Final     Comment:     Above desirable:  100-129 mg/dl  Borderline High:  130-159 mg/dL  High:             160-189 mg/dL  Very high:       >189 mg/dl       Non HDL Cholesterol   Date Value Ref Range Status   09/13/2018 199 (H) <130 mg/dL Final     Comment:     Above Desirable:  130-159 mg/dl  Borderline high:  160-189 mg/dl  High:             190-219 mg/dl  Very high:       >219 mg/dl         Virals:  CMV and EBV pending.   No lab results found.   No results found for: HCVAB, HQTG, HCGENO, HCPCR, HQTRNA, HEPRNA, CRYOG    No results found for:  HCVAB, HBSAB, HBSAG        Again, thank you for allowing me to participate in the care of your patient.      Sincerely,    Betina Mendoza MD

## 2018-09-14 LAB — INTERPRETATION ECG - MUSE: NORMAL

## 2018-09-17 LAB
GAMMA INTERFERON BACKGROUND BLD IA-ACNC: 0 IU/ML
M TB IFN-G BLD-IMP: NEGATIVE
M TB IFN-G CD4+ BCKGRND COR BLD-ACNC: 0.04 IU/ML
MITOGEN IGNF BCKGRD COR BLD-ACNC: 0 IU/ML
MITOGEN IGNF BCKGRD COR BLD-ACNC: 0.04 IU/ML

## 2018-09-18 LAB
BSA: 1.47 M2
IOHEXOL CL UR+SERPL-VRATE: 12.63 MG/DL
IOHEXOL CL UR+SERPL-VRATE: 6.18 MG/DL
IOHEXOL CL UR+SERPL-VRATE: 68 ML/MIN
IOHEXOL CL UR+SERPL-VRATE: 80 /1.73 M2

## 2018-09-19 ENCOUNTER — TELEPHONE (OUTPATIENT)
Dept: TRANSPLANT | Facility: CLINIC | Age: 48
End: 2018-09-19

## 2018-09-19 NOTE — TELEPHONE ENCOUNTER
Called Eugenia to review the results of her Living Donor Committee discussion.  Informed Eugenia that she has been does not meet our criteria for living kidney donation and that we have to formally deny her from the kidney donor evaluation process.     We talked about her abnormal results:     Low GFR (Glomuler Filtration Rate) as evidenced by Iohexal testing value of 68 --> This value is does not meet our criteria for kidney donation.  You should review these results with your Primary Care Provider (PCP)     Elevated cholesterol (261) and LDL (180) --> Follow up with your PCP    Fasting glucose: 102 (previous results: 102, 103, 104) --> Follow up with your PCP    Heart murmur noted on Nephrology physical exam --> you had a subsequent ECHO completed on day of eval and those results were within normal limits (results are attached).  You should review these results with your PCP.     Eugenia was sad and disappointed to hear she could not continue in the process of living donor evaluation but verbalized understanding the above information and was appreciative of the chance to be evaluated.  I thanked Eugenia for her generous offer and for the seema and effort that went into this process.    I will mail and email Eugenia (per her request) her lab work, imaging, and Nephrology/Surgery consults for her own records and to review with her PCP.    Questions answered.  Eugenia knows how to reach out with additional questions/concerns.      Earlene Chaudhary RN, BSN, CCTN  Living Donor Coordinator  [phone]

## 2018-09-20 LAB — CANNABINOIDS UR CFM-MCNC: >1000 NG/ML

## 2019-02-13 ENCOUNTER — HOSPITAL ENCOUNTER (EMERGENCY)
Facility: HOSPITAL | Age: 49
Discharge: HOME | End: 2019-02-13
Attending: NURSE PRACTITIONER | Admitting: NURSE PRACTITIONER
Payer: MEDICAID

## 2019-02-13 ENCOUNTER — APPOINTMENT (OUTPATIENT)
Dept: GENERAL RADIOLOGY | Facility: HOSPITAL | Age: 49
End: 2019-02-13
Attending: NURSE PRACTITIONER
Payer: MEDICAID

## 2019-02-13 VITALS
DIASTOLIC BLOOD PRESSURE: 95 MMHG | OXYGEN SATURATION: 98 % | RESPIRATION RATE: 16 BRPM | SYSTOLIC BLOOD PRESSURE: 128 MMHG | TEMPERATURE: 98.2 F

## 2019-02-13 DIAGNOSIS — T14.8XXA SPRAIN AND STRAIN: ICD-10-CM

## 2019-02-13 PROCEDURE — 99213 OFFICE O/P EST LOW 20 MIN: CPT | Mod: Z6 | Performed by: NURSE PRACTITIONER

## 2019-02-13 PROCEDURE — G0463 HOSPITAL OUTPT CLINIC VISIT: HCPCS

## 2019-02-13 PROCEDURE — 73630 X-RAY EXAM OF FOOT: CPT | Mod: TC,LT

## 2019-02-13 ASSESSMENT — ENCOUNTER SYMPTOMS
FEVER: 0
WEAKNESS: 0
PSYCHIATRIC NEGATIVE: 1
MYALGIAS: 1
NUMBNESS: 1
ARTHRALGIAS: 1

## 2019-02-13 NOTE — ED AVS SNAPSHOT
[facility]  [address]  [place] [address]  Phone:  [phone]                                    Eugenia Earl   MRN: [id number]    Department:  [facility]   Date of Visit:  2/13/2019           After Visit Summary Signature Page    I have received my discharge instructions, and my questions have been answered. I have discussed any challenges I see with this plan with the nurse or doctor.    ..........................................................................................................................................  Patient/Patient Representative Signature      ..........................................................................................................................................  Patient Representative Print Name and Relationship to Patient    ..................................................               ................................................  Date                                   Time    ..........................................................................................................................................  Reviewed by Signature/Title    ...................................................              ..............................................  Date                                               Time          22EPIC Rev 08/18

## 2019-02-13 NOTE — ED PROVIDER NOTES
History     Chief Complaint   Patient presents with     Foot Pain     left foot injury     HPI  Eugenia Earl is a 49 year old female who was walking, twisted her left  Foot inward , now has pain to top foot and up shin. Difficult to walk.   States has had left leg tingling for years.  Some numbness to top foot.      Allergies:  Allergies   Allergen Reactions     Nka [No Known Allergies]        Problem List:    Patient Active Problem List    Diagnosis Date Noted     Transplant donor evaluation 08/29/2018     Priority: Medium     Post-operative state 11/10/2017     Priority: Medium     Status post laparoscopic assisted vaginal hysterectomy 09/28/2017     Priority: Medium     Surgery, elective 09/27/2017     Priority: Medium     Compression of lumbar nerve root 02/28/2017     Priority: Medium     Endometriosis of uterus 05/10/2001     Priority: Medium     Dysmenorrhea 04/16/2001     Priority: Medium        Past Medical History:    Past Medical History:   Diagnosis Date     Dysmenorrhea 04/16/2001     Endometriosis of uterus 05/10/2001     Endometriosis, site unspecified 06/18/2001     Follow-up examination, following other surgery 10/01/2008     Follow-up examination, following unspecified surgery 04/19/2001     Nonallopathic lesion of cervical region, not elsewhere classified 12/19/2001       Past Surgical History:    Past Surgical History:   Procedure Laterality Date     LAMINECTOMY LUMBAR POSTERIOR MICROSCOPIC ONE LEVEL N/A 9/30/2015    Procedure: LAMINECTOMY LUMBAR POSTERIOR MICROSCOPIC ONE LEVEL;  Surgeon: Danyel Kaba MD;  Location: [facility]     LAPAROSCOPIC ASSISTED HYSTERECTOMY VAGINAL, BILATERAL SALPINGO-OOPHORECTOMY, COMBINED Left 9/27/2017    Procedure: COMBINED LAPAROSCOPIC ASSISTED HYSTERECTOMY VAGINAL, SALPINGO-OOPHORECTOMY;  LAPAROSCOPIC ASSISTED VAGINAL HYSTERECTOMY, BILATERAL SALPINGECTOMY, LEFT OOPHORECTOMY, Cystoscopy;  Surgeon: Chapo Szymanski MD;  Location: [facility]     TONSILLECTOMY        TUBAL LIGATION         Family History:    Family History   Problem Relation Age of Onset     Diabetes Mother      Coronary Artery Disease Father      Hyperlipidemia Father        Social History:  Marital Status:  Single [1]  Social History     Tobacco Use     Smoking status: Former Smoker     Smokeless tobacco: Never Used   Substance Use Topics     Alcohol use: Yes     Comment: occasionally     Drug use: Yes     Types: Marijuana     Comment: 3 times weeks         Medications:      acetaminophen-codeine (TYLENOL WITH CODEINE #3) 300-30 MG per tablet         Review of Systems   Constitutional: Negative for fever.   Musculoskeletal: Positive for arthralgias and myalgias.   Neurological: Positive for numbness. Negative for weakness.   Psychiatric/Behavioral: Negative.        Physical Exam   BP: 128/95  Heart Rate: 111  Temp: 98.2  F (36.8  C)  Resp: 16  SpO2: 98 %      Physical Exam   Constitutional: She is oriented to person, place, and time. She appears well-developed and well-nourished. No distress.   Eyes: EOM are normal. Pupils are equal, round, and reactive to light.   Musculoskeletal: She exhibits tenderness. She exhibits no edema or deformity.   Left foot not swollen, tender to top foot. No bruising.  Pulses intact.     Neurological: She is alert and oriented to person, place, and time. She displays normal reflexes.   Skin: Skin is warm and dry.   Psychiatric: She has a normal mood and affect.       ED Course        Procedures                 Results for orders placed or performed during the hospital encounter of 02/13/19 (from the past 24 hour(s))   Foot XR, G/E 3 views, left    Narrative    PROCEDURE:  XR FOOT LT G/E 3 VW    HISTORY: twisted foot today    COMPARISON:  None.    TECHNIQUE:  3 views of the left foot were obtained.    FINDINGS:  No fracture or dislocation is identified. The joint spaces  are preserved.  There is bony spurring at the insertion of the plantar  fascia into the calcaneus.       Impression    IMPRESSION: No acute fracture.      JOSE GARCIA MD       Medications - No data to display    Assessments & Plan (with Medical Decision Making)     I have reviewed the nursing notes.    I have reviewed the findings, diagnosis, plan and need for follow up with the patient.         Medication List      There are no discharge medications for this visit.         Final diagnoses:   Sprain and strain     ASSESSMENT / PLAN:  (T14.8XXA) Sprain and strain  Comment:   Plan: 1. Rest, ice, ace, elevate.   2. Note to be off work today and tomorrow.        2/13/2019   [facility]     Anish Scruggs, NP  02/13/19 1134

## 2019-02-13 NOTE — ED TRIAGE NOTES
Pt presents with left foot pain. States that she was walking this morning when she twisted her foot and heard a crunch. States that the top of her foot hurts and is able to move her toes very little.

## 2019-02-13 NOTE — ED TRIAGE NOTES
"Patient presents with complaints of left foot pain.  States she was walking around, twisted her foot/ankle and heard \"crunching\"  "

## 2019-02-13 NOTE — DISCHARGE INSTRUCTIONS
1. Sprain in foot  2. Ace bandae day and night for few days  2. Ibuprofen 800mg  with food 3 times a day for 3-5 days  3.  Rest, Elevate, ice, compress with ace bandage

## 2019-10-03 NOTE — ED NOTES
Patient presents with sore throat X 1 week.     [Medical Records] : medical records [Patient] : patient

## 2020-03-02 ENCOUNTER — HEALTH MAINTENANCE LETTER (OUTPATIENT)
Age: 50
End: 2020-03-02

## 2020-10-18 ENCOUNTER — HOSPITAL ENCOUNTER (EMERGENCY)
Facility: HOSPITAL | Age: 50
Discharge: HOME | End: 2020-10-18
Attending: NURSE PRACTITIONER | Admitting: NURSE PRACTITIONER
Payer: COMMERCIAL

## 2020-10-18 ENCOUNTER — APPOINTMENT (OUTPATIENT)
Dept: GENERAL RADIOLOGY | Facility: HOSPITAL | Age: 50
End: 2020-10-18
Attending: NURSE PRACTITIONER
Payer: COMMERCIAL

## 2020-10-18 VITALS
DIASTOLIC BLOOD PRESSURE: 99 MMHG | TEMPERATURE: 97.6 F | OXYGEN SATURATION: 97 % | HEART RATE: 107 BPM | RESPIRATION RATE: 14 BRPM | SYSTOLIC BLOOD PRESSURE: 131 MMHG

## 2020-10-18 DIAGNOSIS — M54.2 NECK PAIN: ICD-10-CM

## 2020-10-18 DIAGNOSIS — S29.011A MUSCLE STRAIN OF CHEST WALL, INITIAL ENCOUNTER: ICD-10-CM

## 2020-10-18 PROCEDURE — 99214 OFFICE O/P EST MOD 30 MIN: CPT | Performed by: NURSE PRACTITIONER

## 2020-10-18 PROCEDURE — G0463 HOSPITAL OUTPT CLINIC VISIT: HCPCS | Mod: 25

## 2020-10-18 PROCEDURE — 250N000011 HC RX IP 250 OP 636: Performed by: NURSE PRACTITIONER

## 2020-10-18 PROCEDURE — 96372 THER/PROPH/DIAG INJ SC/IM: CPT | Performed by: NURSE PRACTITIONER

## 2020-10-18 PROCEDURE — 72040 X-RAY EXAM NECK SPINE 2-3 VW: CPT

## 2020-10-18 PROCEDURE — 71046 X-RAY EXAM CHEST 2 VIEWS: CPT

## 2020-10-18 RX ORDER — KETOROLAC TROMETHAMINE 30 MG/ML
30 INJECTION, SOLUTION INTRAMUSCULAR; INTRAVENOUS ONCE
Status: COMPLETED | OUTPATIENT
Start: 2020-10-18 | End: 2020-10-18

## 2020-10-18 RX ORDER — CYCLOBENZAPRINE HCL 10 MG
10 TABLET ORAL 3 TIMES DAILY PRN
Qty: 18 TABLET | Refills: 0 | Status: SHIPPED | OUTPATIENT
Start: 2020-10-18 | End: 2020-10-24

## 2020-10-18 RX ADMIN — KETOROLAC TROMETHAMINE 30 MG: 30 INJECTION, SOLUTION INTRAMUSCULAR at 15:42

## 2020-10-18 ASSESSMENT — ENCOUNTER SYMPTOMS
CHEST TIGHTNESS: 0
CHILLS: 0
VOMITING: 0
NECK PAIN: 1
HEADACHES: 1
ACTIVITY CHANGE: 1
NAUSEA: 1
FEVER: 0
LIGHT-HEADEDNESS: 0
DIZZINESS: 0
SHORTNESS OF BREATH: 0

## 2020-10-18 NOTE — DISCHARGE INSTRUCTIONS
Keep affected extremity elevated as much as possible for next 24 - 48 hours. Ice to affected area 20 minutes every hour as needed for comfort. After 48 hours you can apply heat. Ibuprofen 600 to 800 mg (3 - 4 tabs of over the counter med) every six to eight hours as needed;not to exceed maximum amount of 3200 mg in 24 hours.Tylenol 650 to 1000 mg every four to six hours as needed (not to exceed more than 4000 mg in a 24 hour period). May use interchangeably. Suggest medicating around the clock for the next 24-48 hours.  Follow up with primary provider for neck pain.
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
## 2020-10-18 NOTE — ED AVS SNAPSHOT
[facility]  [address]  [place] [address]  Phone: [phone]                                    Eugenia Earl   MRN: [id number]    Department: [facility]   Date of Visit: 10/18/2020           After Visit Summary Signature Page    I have received my discharge instructions, and my questions have been answered. I have discussed any challenges I see with this plan with the nurse or doctor.    ..........................................................................................................................................  Patient/Patient Representative Signature      ..........................................................................................................................................  Patient Representative Print Name and Relationship to Patient    ..................................................               ................................................  Date                                   Time    ..........................................................................................................................................  Reviewed by Signature/Title    ...................................................              ..............................................  Date                                               Time          22EPIC Rev 08/18

## 2020-11-21 ENCOUNTER — HOSPITAL ENCOUNTER (EMERGENCY)
Facility: HOSPITAL | Age: 50
Discharge: HOME | End: 2020-11-21
Attending: FAMILY MEDICINE | Admitting: FAMILY MEDICINE
Payer: COMMERCIAL

## 2020-11-21 VITALS
TEMPERATURE: 99.9 F | SYSTOLIC BLOOD PRESSURE: 134 MMHG | OXYGEN SATURATION: 98 % | RESPIRATION RATE: 18 BRPM | HEART RATE: 111 BPM | DIASTOLIC BLOOD PRESSURE: 95 MMHG

## 2020-11-21 DIAGNOSIS — M54.50 ACUTE RIGHT-SIDED LOW BACK PAIN, UNSPECIFIED WHETHER SCIATICA PRESENT: Primary | ICD-10-CM

## 2020-11-21 PROCEDURE — 250N000011 HC RX IP 250 OP 636: Performed by: FAMILY MEDICINE

## 2020-11-21 PROCEDURE — 96372 THER/PROPH/DIAG INJ SC/IM: CPT | Performed by: FAMILY MEDICINE

## 2020-11-21 PROCEDURE — 99284 EMERGENCY DEPT VISIT MOD MDM: CPT | Mod: 25

## 2020-11-21 PROCEDURE — 250N000013 HC RX MED GY IP 250 OP 250 PS 637: Performed by: EMERGENCY MEDICINE

## 2020-11-21 PROCEDURE — 250N000013 HC RX MED GY IP 250 OP 250 PS 637: Performed by: FAMILY MEDICINE

## 2020-11-21 PROCEDURE — 99283 EMERGENCY DEPT VISIT LOW MDM: CPT | Performed by: FAMILY MEDICINE

## 2020-11-21 RX ORDER — KETOROLAC TROMETHAMINE 15 MG/ML
15 INJECTION, SOLUTION INTRAMUSCULAR; INTRAVENOUS ONCE
Status: COMPLETED | OUTPATIENT
Start: 2020-11-21 | End: 2020-11-21

## 2020-11-21 RX ORDER — OXYCODONE AND ACETAMINOPHEN 5; 325 MG/1; MG/1
1-2 TABLET ORAL EVERY 4 HOURS PRN
Qty: 6 TABLET | Refills: 0 | Status: SHIPPED | OUTPATIENT
Start: 2020-11-21 | End: 2022-04-18

## 2020-11-21 RX ORDER — OXYCODONE AND ACETAMINOPHEN 5; 325 MG/1; MG/1
1 TABLET ORAL ONCE
Status: DISCONTINUED | OUTPATIENT
Start: 2020-11-21 | End: 2020-11-21 | Stop reason: CLARIF

## 2020-11-21 RX ORDER — LIDOCAINE 4 G/G
1 PATCH TOPICAL ONCE
Status: DISCONTINUED | OUTPATIENT
Start: 2020-11-21 | End: 2020-11-21 | Stop reason: HOSPADM

## 2020-11-21 RX ORDER — CYCLOBENZAPRINE HCL 10 MG
10 TABLET ORAL ONCE
Status: COMPLETED | OUTPATIENT
Start: 2020-11-21 | End: 2020-11-21

## 2020-11-21 RX ADMIN — CYCLOBENZAPRINE 10 MG: 10 TABLET, FILM COATED ORAL at 08:05

## 2020-11-21 RX ADMIN — Medication 1 PATCH: at 08:46

## 2020-11-21 RX ADMIN — KETOROLAC TROMETHAMINE 15 MG: 15 INJECTION, SOLUTION INTRAMUSCULAR; INTRAVENOUS at 08:05

## 2020-11-21 ASSESSMENT — ENCOUNTER SYMPTOMS
ARTHRALGIAS: 0
FATIGUE: 0
NECK STIFFNESS: 0
DIFFICULTY URINATING: 0
CONFUSION: 0
HEADACHES: 0
UNEXPECTED WEIGHT CHANGE: 0
WHEEZING: 0
ABDOMINAL PAIN: 0
COLOR CHANGE: 0
EYE REDNESS: 0

## 2020-11-21 NOTE — DISCHARGE INSTRUCTIONS
You were seen today in the [facility] emergency department for right-sided back pain.  We believe your symptoms are related to your known osteoarthritis as well as a possible paraspinal muscle strain.  We treated you with several medications which should help you feel more comfortable today.  We would also recommend that you continue to take Flexeril 10 mg up to twice daily.  You should take in addition to this naproxen 500 mg up to twice daily and Tylenol 500 mg every 6 hours.  You can also use topical agents like lidocaine patches and creams which are available over-the-counter.  Please continue to follow-up with the spine specialist as you have planned.  Today we do not think you need emergency neuroimaging but there are other studies like MRI of your back which could be performed to further evaluate this in the outpatient setting.  If you have any severe weakness of your right leg or other immediate concern we can reevaluate in the ER.

## 2020-11-21 NOTE — ED NOTES
Patient states that she would not like to take the percocet so close to taking the flexeril. Requests to take at home if possible. Reported to Dr. Graham.

## 2020-11-21 NOTE — ED AVS SNAPSHOT
[facility]  [address]  [place] [address]  Phone: [phone]                                    Eugenia Earl   MRN: [id number]    Department: [facility]   Date of Visit: 11/21/2020           After Visit Summary Signature Page    I have received my discharge instructions, and my questions have been answered. I have discussed any challenges I see with this plan with the nurse or doctor.    ..........................................................................................................................................  Patient/Patient Representative Signature      ..........................................................................................................................................  Patient Representative Print Name and Relationship to Patient    ..................................................               ................................................  Date                                   Time    ..........................................................................................................................................  Reviewed by Signature/Title    ...................................................              ..............................................  Date                                               Time          22EPIC Rev 08/18

## 2020-11-21 NOTE — ED NOTES
Patient presents with right sided lumbar back pain that radiates down the left buttock and down left leg. Patient moved in bed this morning and felt a pop. Patient denies any weakness or loss of bowel or bladder. Ice pack given.
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
## 2020-11-21 NOTE — ED PROVIDER NOTES
History     Chief Complaint   Patient presents with     Back Pain     HPI  Eugenia Earl is a 50 year old woman with history of L4/L5 laminectomy (2015) who presents with the acute onset of low back pain after she rolled over in bed. She feels an acute throbbing pain that intermittently radiates around her right thigh and groin and down toward her knee. She is able to walk, but it is painful. No inability to urinate or fecal incontinence.    No fevers/chills, cough, shortness of breath, loss of taste/smell or nausea/vomiting/diarrhea. No recent known exposure to COVID.      Allergies:  Allergies   Allergen Reactions     Nka [No Known Allergies]        Problem List:    Patient Active Problem List    Diagnosis Date Noted     Transplant donor evaluation 08/29/2018     Priority: Medium     Post-operative state 11/10/2017     Priority: Medium     Status post laparoscopic assisted vaginal hysterectomy 09/28/2017     Priority: Medium     Surgery, elective 09/27/2017     Priority: Medium     Compression of lumbar nerve root 02/28/2017     Priority: Medium     Endometriosis of uterus 05/10/2001     Priority: Medium     Dysmenorrhea 04/16/2001     Priority: Medium        Past Medical History:    Past Medical History:   Diagnosis Date     Dysmenorrhea 04/16/2001     Endometriosis of uterus 05/10/2001     Endometriosis, site unspecified 06/18/2001     Follow-up examination, following other surgery 10/01/2008     Follow-up examination, following unspecified surgery 04/19/2001     Nonallopathic lesion of cervical region, not elsewhere classified 12/19/2001       Past Surgical History:    Past Surgical History:   Procedure Laterality Date     LAMINECTOMY LUMBAR POSTERIOR MICROSCOPIC ONE LEVEL N/A 9/30/2015    Procedure: LAMINECTOMY LUMBAR POSTERIOR MICROSCOPIC ONE LEVEL;  Surgeon: Danyel Kaba MD;  Location: [facility]     LAPAROSCOPIC ASSISTED HYSTERECTOMY VAGINAL, BILATERAL SALPINGO-OOPHORECTOMY, COMBINED Left 9/27/2017     Procedure: COMBINED LAPAROSCOPIC ASSISTED HYSTERECTOMY VAGINAL, SALPINGO-OOPHORECTOMY;  LAPAROSCOPIC ASSISTED VAGINAL HYSTERECTOMY, BILATERAL SALPINGECTOMY, LEFT OOPHORECTOMY, Cystoscopy;  Surgeon: Chapo Szymanski MD;  Location: [facility]     TONSILLECTOMY       TUBAL LIGATION         Family History:    Family History   Problem Relation Age of Onset     Diabetes Mother      Coronary Artery Disease Father      Hyperlipidemia Father        Social History:  Marital Status:  Single [1]  Social History     Tobacco Use     Smoking status: Former Smoker     Smokeless tobacco: Never Used   Substance Use Topics     Alcohol use: Yes     Comment: occasionally     Drug use: Yes     Types: Marijuana     Comment: 3 times weeks         Medications:    No current outpatient medications on file.        Review of Systems   Constitutional: Negative for fatigue and unexpected weight change.   HENT: Negative for congestion.    Eyes: Negative for redness.   Respiratory: Negative for wheezing.    Cardiovascular: Negative for chest pain.   Gastrointestinal: Negative for abdominal pain.   Genitourinary: Negative for difficulty urinating.   Musculoskeletal: Negative for arthralgias and neck stiffness.   Skin: Negative for color change.   Neurological: Negative for headaches.   Psychiatric/Behavioral: Negative for confusion.       Physical Exam   BP: 134/95  Pulse: 111  Temp: 99.9  F (37.7  C)  Resp: 18  SpO2: 98 %      Physical Exam    General: awake, alert, sitting comfortably    Head: atraumatic.    Ears: no drainage, external ears normal.    Eyes: no injection or discharge, non-icteric.    Nose: no discharge or congestion.    Mouth/Throat: moist mucous membranes.    Neck: supple, full & painless ROM.    Lungs: no retractions or acute respiratory distress. No tachypnea.    Musculoskeletal/Extremities: full & painless ROM, no lower extremity edema or gross abnormality.    Back: pain with palpation of left paraspinous musculature at L2 level with  radiation of pain around toward right groin.    Skin: warm, dry, no cyanosis or rash.    Psych: alert & oriented x 3, fluid, logical thought & speech.      ED Course     0800  Patient's care transferred to Dr. Graham at shift change at 0800.     Procedures           No results found for this or any previous visit (from the past 24 hour(s)).    Medications   ketorolac (TORADOL) injection 15 mg (has no administration in time range)   cyclobenzaprine (FLEXERIL) tablet 10 mg (has no administration in time range)       Assessments & Plan (with Medical Decision Making)   The patient is a 50 year old woman with acute lumbar back pain.    1) Acute lumbar back pain    I have reviewed the nursing notes.    I have reviewed the findings, diagnosis, plan and need for follow up with the patient.  Addendum: I met the patient after signout and she was feeling slightly more comfortable after initial interventions of Toradol and Flexeril.  We applied a lidocaine patch to her as well.  Her symptoms seem mechanical and likely related to paraspinal muscle strain especially given her history of underlying degenerative disease.  No signs of cauda equina or other emergent pathology.  No infectious symptoms or urinary symptoms.  She was comfortable with the plan to discharge home.  She is already following up with spine care and will review any ongoing symptoms with them.  Otherwise will take scheduled NSAIDs, Flexeril as needed, and can use Percocet, #6 prescribed for breakthrough severe discomfort at nighttime only.  She will the emergency department stable condition.    Giacomo Graham MD        Final diagnoses:   Acute right-sided low back pain, unspecified whether sciatica present       11/21/2020   [facility]

## 2020-11-21 NOTE — ED NOTES
Patient given written and verbal discharge instructions and patient verbalizes understanding. Patient given written prescription for percocet. Patient left department ambulatory.

## 2020-12-20 ENCOUNTER — HEALTH MAINTENANCE LETTER (OUTPATIENT)
Age: 50
End: 2020-12-20

## 2021-02-28 ENCOUNTER — HEALTH MAINTENANCE LETTER (OUTPATIENT)
Age: 51
End: 2021-02-28

## 2021-04-18 ENCOUNTER — HEALTH MAINTENANCE LETTER (OUTPATIENT)
Age: 51
End: 2021-04-18

## 2021-06-22 ENCOUNTER — MEDICAL CORRESPONDENCE (OUTPATIENT)
Dept: HEALTH INFORMATION MANAGEMENT | Facility: HOSPITAL | Age: 51
End: 2021-06-22

## 2021-06-25 ENCOUNTER — HOSPITAL ENCOUNTER (OUTPATIENT)
Dept: ULTRASOUND IMAGING | Facility: HOSPITAL | Age: 51
End: 2021-06-25
Attending: NURSE PRACTITIONER
Payer: COMMERCIAL

## 2021-06-25 ENCOUNTER — HOSPITAL ENCOUNTER (OUTPATIENT)
Dept: MAMMOGRAPHY | Facility: OTHER | Age: 51
End: 2021-06-25
Attending: NURSE PRACTITIONER
Payer: COMMERCIAL

## 2021-06-25 DIAGNOSIS — N63.20 LEFT BREAST LUMP: ICD-10-CM

## 2021-06-25 PROCEDURE — 77061 BREAST TOMOSYNTHESIS UNI: CPT | Mod: TC

## 2021-06-25 PROCEDURE — 76642 ULTRASOUND BREAST LIMITED: CPT | Mod: LT

## 2021-10-03 ENCOUNTER — HEALTH MAINTENANCE LETTER (OUTPATIENT)
Age: 51
End: 2021-10-03

## 2021-11-23 NOTE — ED AVS SNAPSHOT
[facility]    [address] [address]    Phone:  [phone]                                       Eugenia Earl   MRN: [id number]    Department:  [facility]   Date of Visit:  7/9/2018           After Visit Summary Signature Page     I have received my discharge instructions, and my questions have been answered. I have discussed any challenges I see with this plan with the nurse or doctor.    ..........................................................................................................................................  Patient/Patient Representative Signature      ..........................................................................................................................................  Patient Representative Print Name and Relationship to Patient    ..................................................               ................................................  Date                                            Time    ..........................................................................................................................................  Reviewed by Signature/Title    ...................................................              ..............................................  Date                                                            Time           No chief complaint on file.      Patient Active Problem List   Diagnosis   • Pure hypercholesterolemia   • Lumbago   • S/P Left Total Hip Replacement        Past Surgical History:   Procedure Laterality Date   • Arthroscopy hip w femoroplasty Left 07/25/2013   • Arthroscopy hip w femoroplasty Right 12/11/2013   • Cystourethroscopy  9/5/2015    Dr AMRIT Butler/ [facility]   • Laparoscopy, cholecystectomy  04/12/2019    Dr. Matt Salinas   • Open access colonoscopy  2006,2011    [place]; polyps; FHx colon CA   • Pelvis/hip joint surgery unlisted  8/13    L hip resurfacing, labral tear-  Matthew   • Total hip replacement Left 12/09/2015    Dr. Marino       Social History     Socioeconomic History   • Marital status: /Civil Union     Spouse name: Not on file   • Number of children: Not on file   • Years of education: Not on file   • Highest education level: Not on file   Occupational History   • Occupation: banker   Tobacco Use   • Smoking status: Never Smoker   • Smokeless tobacco: Never Used   Substance and Sexual Activity   • Alcohol use: Yes     Comment: rare   • Drug use: No   • Sexual activity: Not on file   Other Topics Concern   • Not on file   Social History Narrative   • Not on file     Social Determinants of Health     Financial Resource Strain:    • Social Determinants: Financial Resource Strain: Not on file   Food Insecurity:    • Social Determinants: Food Insecurity: Not on file   Transportation Needs:    • Lack of Transportation (Medical): Not on file   • Lack of Transportation (Non-Medical): Not on file   Physical Activity:    • Days of Exercise per Week: Not on file   • Minutes of Exercise per Session: Not on file   Stress:    • Social Determinants: Stress: Not on file   Social Connections:    • Social Determinants: Social Connections: Not on file   Intimate Partner Violence:    • Social Determinants: Intimate Partner Violence Past Fear: Not on file   • Social Determinants: Intimate Partner Violence  Current Fear: Not on file       Family History   Problem Relation Age of Onset   • Cancer Father 60        colon       Current Outpatient Medications   Medication Sig Dispense Refill   • cephalexin (KEFLEX) 500 MG capsule Take 4 tabs 1 hour prior to any dental procedure or invasive procedure where an antibiotic will not be prescribed. 20 capsule 2   • pravastatin (PRAVACHOL) 40 MG tablet Take 1 tablet by mouth daily. 90 tablet 3   • alfuzosin (UROXATRAL) 10 MG 24 hr tablet Take 1 tablet by mouth daily. 90 tablet 3   • aspirin 81 MG tablet Take 81 mg by mouth daily.       No current facility-administered medications for this visit.       Eyes: Patient denies visual blurring, double vision, glaucoma, decrease vision, eye pain  ENT: Patient denies headaches, oropharyngeal problems, respiratory tract allergies  Cardiovascular: Patient denies palpitations, exertional chest pain or pressure, paroxysmal nocturnal dyspnea, shortness of breath, orthopnea, lower extremity edema, claudication  Respiratory: Patient denies cough, sputum, hemoptysis, pleuritic type chest pain, wheezing  Gastrointestinal: Patient denies nausea, vomiting, heartburn or reflux, dyspepsia, abdominal pain, constipation, diarrhea, jaundice  Genitourinary: Patient denies nocturia, dysuria, frequency, hesitancy, hematuria, incontinence  Muscoloskeletal: Patient denies joint pains  Integumentary: Patient denies rash, easy sunburn, hair changes, nail changes  Neurological: Patient denies migraine headaches, syncope, seizures, dizzyness or lightheadedness, focal neurological deficits  Psychiatric: Patient denies anxiety, depression, sleep disturbance  Endocrine: Negative history of cold intolerance, heat intolerance, polyphagia, polydipsia, polyuria  Hematologic and/or Lymphatic: Patient denies fever, night sweats, chills, easy fatiguibility, recent change in appetite, recent weight change      OBJECTIVE:  The patient appears healthy,alert,in no  distress.  BUILD:  Body mass index is 26.04 kg/m².    adenopathy. Thyroid symmetric, normal size,  EXTREMITIES: the extremities are normal with no signs of inflammation or injury. There is no edema.  CHEST: chest symmetric with normal A/P diameter, no deformities noted  LUNGS: clear to auscultation and percussion  HEART: PMI normal, regular rate and rhythm, S1 and S2 normal, no S3 or S4, with no gallops, murmurs or rubs and no clicks  ABDOMEN: no guarding, ridigity or tenderness, no renal angle tenderness, no organomegaly, no masses, no free fluid, bowel sounds normal  NEUROLOGIC: skull and spine normal, CN II-XII intact, motor system- bulk, tone and power at all joints normal, sensory system- both superficial and deep normal, cerebellum normal, reflexes normal and symmetric and plantars flexor  SKIN: Negative    ASSESSMENT: Routine general medical examination at a health care facility  (primary encounter diagnosis)       Recent Review Flowsheet Data     Date 12/1/2020    Adult PHQ 2 Score 0    Adult PHQ 2 Interpretation No further screening needed    Little interest or pleasure in activity? 0    Feeling down, depressed or hopeless? 0       View Complete Flowsheet ...More Data Exists          DEPRESSION ASSESSMENT/PLAN:  Depression screening is negative no further plan needed.     Patient has or her family history his dad a heart attack in his grandpa had a stroke  He is not able to tolerate the statin  He is on a natural supplement which seems to have helped   If it is high this time I have told him that he could have a consultation with the cardiologist  In the meanwhile, he did have a cardiac CT which showed a score of 4  I have encouraged him to stop the baby aspirin     He believes he is current with a colonoscopy  Will check the dates and call us back     Continues to haveLUTS  I have put in a urology consultation

## 2021-12-22 ENCOUNTER — APPOINTMENT (OUTPATIENT)
Dept: CT IMAGING | Facility: HOSPITAL | Age: 51
End: 2021-12-22
Attending: PHYSICIAN ASSISTANT
Payer: COMMERCIAL

## 2021-12-22 ENCOUNTER — HOSPITAL ENCOUNTER (EMERGENCY)
Facility: HOSPITAL | Age: 51
Discharge: HOME | End: 2021-12-22
Admitting: PHYSICIAN ASSISTANT
Payer: COMMERCIAL

## 2021-12-22 VITALS
HEART RATE: 87 BPM | TEMPERATURE: 97.5 F | RESPIRATION RATE: 14 BRPM | OXYGEN SATURATION: 98 % | DIASTOLIC BLOOD PRESSURE: 73 MMHG | SYSTOLIC BLOOD PRESSURE: 118 MMHG

## 2021-12-22 DIAGNOSIS — T81.49XA POSTOPERATIVE ABSCESS: Primary | ICD-10-CM

## 2021-12-22 LAB
ANION GAP SERPL CALCULATED.3IONS-SCNC: 4 MMOL/L (ref 3–14)
BASOPHILS # BLD AUTO: 0.1 10E3/UL (ref 0–0.2)
BASOPHILS NFR BLD AUTO: 1 %
BUN SERPL-MCNC: 17 MG/DL (ref 7–30)
CALCIUM SERPL-MCNC: 9.3 MG/DL (ref 8.5–10.1)
CHLORIDE BLD-SCNC: 105 MMOL/L (ref 94–109)
CO2 SERPL-SCNC: 29 MMOL/L (ref 20–32)
CREAT SERPL-MCNC: 0.67 MG/DL (ref 0.52–1.04)
EOSINOPHIL # BLD AUTO: 0.4 10E3/UL (ref 0–0.7)
EOSINOPHIL NFR BLD AUTO: 5 %
ERYTHROCYTE [DISTWIDTH] IN BLOOD BY AUTOMATED COUNT: 12.2 % (ref 10–15)
GFR SERPL CREATININE-BSD FRML MDRD: >90 ML/MIN/1.73M2
GLUCOSE BLD-MCNC: 103 MG/DL (ref 70–99)
HCT VFR BLD AUTO: 40.5 % (ref 35–47)
HGB BLD-MCNC: 13.6 G/DL (ref 11.7–15.7)
IMM GRANULOCYTES # BLD: 0 10E3/UL
IMM GRANULOCYTES NFR BLD: 0 %
LACTATE SERPL-SCNC: 0.8 MMOL/L (ref 0.7–2)
LYMPHOCYTES # BLD AUTO: 2.8 10E3/UL (ref 0.8–5.3)
LYMPHOCYTES NFR BLD AUTO: 31 %
MCH RBC QN AUTO: 29.6 PG (ref 26.5–33)
MCHC RBC AUTO-ENTMCNC: 33.6 G/DL (ref 31.5–36.5)
MCV RBC AUTO: 88 FL (ref 78–100)
MONOCYTES # BLD AUTO: 0.6 10E3/UL (ref 0–1.3)
MONOCYTES NFR BLD AUTO: 7 %
NEUTROPHILS # BLD AUTO: 4.9 10E3/UL (ref 1.6–8.3)
NEUTROPHILS NFR BLD AUTO: 56 %
NRBC # BLD AUTO: 0 10E3/UL
NRBC BLD AUTO-RTO: 0 /100
PLATELET # BLD AUTO: 321 10E3/UL (ref 150–450)
POTASSIUM BLD-SCNC: 4 MMOL/L (ref 3.4–5.3)
RBC # BLD AUTO: 4.59 10E6/UL (ref 3.8–5.2)
SODIUM SERPL-SCNC: 138 MMOL/L (ref 133–144)
WBC # BLD AUTO: 8.8 10E3/UL (ref 4–11)

## 2021-12-22 PROCEDURE — 99285 EMERGENCY DEPT VISIT HI MDM: CPT | Mod: 25

## 2021-12-22 PROCEDURE — 82374 ASSAY BLOOD CARBON DIOXIDE: CPT | Performed by: PHYSICIAN ASSISTANT

## 2021-12-22 PROCEDURE — 99284 EMERGENCY DEPT VISIT MOD MDM: CPT | Performed by: PHYSICIAN ASSISTANT

## 2021-12-22 PROCEDURE — 250N000013 HC RX MED GY IP 250 OP 250 PS 637: Performed by: PHYSICIAN ASSISTANT

## 2021-12-22 PROCEDURE — 250N000011 HC RX IP 250 OP 636: Performed by: RADIOLOGY

## 2021-12-22 PROCEDURE — 70491 CT SOFT TISSUE NECK W/DYE: CPT

## 2021-12-22 PROCEDURE — 36415 COLL VENOUS BLD VENIPUNCTURE: CPT | Performed by: PHYSICIAN ASSISTANT

## 2021-12-22 PROCEDURE — 96374 THER/PROPH/DIAG INJ IV PUSH: CPT | Mod: XU

## 2021-12-22 PROCEDURE — 250N000011 HC RX IP 250 OP 636: Performed by: PHYSICIAN ASSISTANT

## 2021-12-22 PROCEDURE — 83605 ASSAY OF LACTIC ACID: CPT | Performed by: PHYSICIAN ASSISTANT

## 2021-12-22 PROCEDURE — 85004 AUTOMATED DIFF WBC COUNT: CPT | Performed by: PHYSICIAN ASSISTANT

## 2021-12-22 RX ORDER — IOPAMIDOL 755 MG/ML
75 INJECTION, SOLUTION INTRAVASCULAR ONCE
Status: COMPLETED | OUTPATIENT
Start: 2021-12-22 | End: 2021-12-22

## 2021-12-22 RX ORDER — VALACYCLOVIR HYDROCHLORIDE 500 MG/1
TABLET, FILM COATED ORAL
COMMUNITY
Start: 2021-04-07 | End: 2022-04-18

## 2021-12-22 RX ORDER — DEXAMETHASONE SODIUM PHOSPHATE 10 MG/ML
10 INJECTION, SOLUTION INTRAMUSCULAR; INTRAVENOUS ONCE
Status: COMPLETED | OUTPATIENT
Start: 2021-12-22 | End: 2021-12-22

## 2021-12-22 RX ORDER — DEXAMETHASONE 2 MG/1
TABLET ORAL
Qty: 20 TABLET | Refills: 0 | Status: SHIPPED | OUTPATIENT
Start: 2021-12-22 | End: 2022-04-18

## 2021-12-22 RX ORDER — CYCLOBENZAPRINE HCL 10 MG
10 TABLET ORAL 3 TIMES DAILY PRN
COMMUNITY
Start: 2021-12-19 | End: 2024-07-19

## 2021-12-22 RX ORDER — OXYCODONE HYDROCHLORIDE 5 MG/1
5 TABLET ORAL ONCE
Status: COMPLETED | OUTPATIENT
Start: 2021-12-22 | End: 2021-12-22

## 2021-12-22 RX ADMIN — OXYCODONE HYDROCHLORIDE 5 MG: 5 TABLET ORAL at 14:40

## 2021-12-22 RX ADMIN — IOPAMIDOL 75 ML: 755 INJECTION, SOLUTION INTRAVENOUS at 15:10

## 2021-12-22 RX ADMIN — DEXAMETHASONE SODIUM PHOSPHATE 10 MG: 10 INJECTION, SOLUTION INTRAMUSCULAR; INTRAVENOUS at 16:57

## 2021-12-22 NOTE — ED NOTES
"Patient reports that she had a cervical fusion on the 16th, was supposed to be discharged home with PCA and it was never arranged. Patient reports difficulty swallowing, d/t \"swelling,\" and pain right sided neck/shoulder pain.  "

## 2021-12-22 NOTE — ED TRIAGE NOTES
"Pt states she had cervical surgery on 12/16. States \"feels like there is swelling and I can't swallow right\". Pt also stated stress at home \"I was suppose to go to rehab to help me recover\". Pt states she is unable to clean the incision herself. Weak cough noted in triage.  "

## 2021-12-22 NOTE — DISCHARGE INSTRUCTIONS
What to expect when you have contrast    During your exam, we will inject  contrast  into your vein or artery. (Contrast is a clear liquid with iodine in it. It shows up on X-rays.)    You may feel warm or hot. You may have a metal taste in your mouth and a slight upset stomach. You may also feel pressure near the kidneys and bladder. These effects will last about 1 to 3 minutes.    Please tell us if you have:   Sneezing    Itching   Hives    Swelling in the face   A hoarse voice   Breathing problems   Other new symptoms    Serious problems are rare.  They may include:   Irregular heartbeat    Seizures   Kidney failure             Tissue damage   Shock     Death    If you have any problems during the exam, we  will treat them right away.    When you get home    Call your hospital if you have any new symptoms in the next 2 days, like hives or swelling. (Phone numbers are at the bottom of this page.) Or call your family doctor.     If you have wheezing or trouble breathing, call 911.    Self-care  -Drink at least 4 extra glasses of water today.   This reduces the stress on your kidneys.  -Keep taking your regular medicines.    The contrast will pass out of your body in your  Urine(pee). This will happen in the next 24 hours. You  will not feel this. Your urine will not  change color.    If you have kidney problems or take metformin    Drink 4 to 8 large glasses of water for the next  2 days, if you are not on a fluid restriction.    ?If you take metformin (Glucophage or Glucovance) for diabetes, keep taking it.      ?Your kidney function tests are abnormal.  If you take Metformin, do not take it for 48 hours. Please go to your clinic for a blood test within 3 days after your exam before the restarting this medicine.     (Note to provider:please give patient prescription for lab tests.)    ?Special instructions: -      I have read and understand the above information.    Patient Sign  Here:______________________________________Date:________Time:______    Staff Sign Here:________________________________________Date:_______Time:______      Radiology Departments:     ?[facility]: [phone] ?[facility]: [phone]     ?[place]: [phone] ?[facility]:[phone]      ?[place]: [phone]  ?[facility]: [phone]  ?[facility]:[phone]    ?[facility]:[phone]  ?[facility]:[phone]    You were seen in the emergency department for swelling in your neck.  Lab work showed no signs of systemic infection.  Your CT was seen and read by radiology and I did show some soft tissue swelling surgery at [facility] in [place] reviewed it and they stated to start you on Decadron and send you home.  If you start having difficulty swallowing water or start having severe pain please return to emergency department.  Please adhere to the discharge instructions on your Decadron as this is a taper.  If your condition worsens please return to the emergency department.

## 2021-12-22 NOTE — ED PROVIDER NOTES
History     Chief Complaint   Patient presents with     Post-op Problem     HPI  Eugenia Earl is a 51 year old female who arrives to the emergency department with neck pain and difficulty swallowing.  Has a past medical history of dysmenorrhea, endometriosis, compression of lumbar nerve root, hysterectomy, and recent facet surgery of C4-C7 on December 16 of 2021.  She states last night she feels more full in her throat and there is difficulty swallowing however she is able to keep liquids down.  She is also been able to take her pain pills at night which include oxycodone and Flexeril.  She states she is able to swallow food as long as it is soft.  She is a concern for her c-collar size it does seem appropriate.  She feels that this is more of an internal swelling versus an external swelling.  She states she has been trolling her pain during the day with acetaminophen due to at home obligations.    Allergies:  Allergies   Allergen Reactions     Nka [No Known Allergies]        Problem List:    Patient Active Problem List    Diagnosis Date Noted     Transplant donor evaluation 08/29/2018     Priority: Medium     Post-operative state 11/10/2017     Priority: Medium     Status post laparoscopic assisted vaginal hysterectomy 09/28/2017     Priority: Medium     Surgery, elective 09/27/2017     Priority: Medium     Compression of lumbar nerve root 02/28/2017     Priority: Medium     Endometriosis of uterus 05/10/2001     Priority: Medium     Dysmenorrhea 04/16/2001     Priority: Medium        Past Medical History:    Past Medical History:   Diagnosis Date     Dysmenorrhea 04/16/2001     Endometriosis of uterus 05/10/2001     Endometriosis, site unspecified 06/18/2001     Follow-up examination, following other surgery 10/01/2008     Follow-up examination, following unspecified surgery 04/19/2001     Nonallopathic lesion of cervical region, not elsewhere classified 12/19/2001       Past Surgical History:    Past Surgical  History:   Procedure Laterality Date     LAMINECTOMY LUMBAR POSTERIOR MICROSCOPIC ONE LEVEL N/A 9/30/2015    Procedure: LAMINECTOMY LUMBAR POSTERIOR MICROSCOPIC ONE LEVEL;  Surgeon: Danyel Kaba MD;  Location: [facility]     LAPAROSCOPIC ASSISTED HYSTERECTOMY VAGINAL, BILATERAL SALPINGO-OOPHORECTOMY, COMBINED Left 9/27/2017    Procedure: COMBINED LAPAROSCOPIC ASSISTED HYSTERECTOMY VAGINAL, SALPINGO-OOPHORECTOMY;  LAPAROSCOPIC ASSISTED VAGINAL HYSTERECTOMY, BILATERAL SALPINGECTOMY, LEFT OOPHORECTOMY, Cystoscopy;  Surgeon: Chapo Szymanski MD;  Location: [facility]     TONSILLECTOMY       TUBAL LIGATION         Family History:    Family History   Problem Relation Age of Onset     Diabetes Mother      Coronary Artery Disease Father      Hyperlipidemia Father        Social History:  Marital Status:  Single [1]  Social History     Tobacco Use     Smoking status: Former Smoker     Smokeless tobacco: Never Used   Substance Use Topics     Alcohol use: Yes     Comment: occasionally     Drug use: Yes     Types: Marijuana     Comment: 3 times weeks         Medications:    cyclobenzaprine (FLEXERIL) 10 MG tablet  dexamethasone (DECADRON) 2 MG tablet  oxyCODONE-acetaminophen (PERCOCET) 5-325 MG tablet  valACYclovir (VALTREX) 500 MG tablet          Review of Systems patient denies chills or sweats, body aches, headache, blurry vision or double vision, endorses difficulty swallowing, denies chest pain or palpitations, shortness of breath she has had a slight productive cough, she is not having abdominal pain, no nausea no vomiting she has had no diarrhea no bowel movement since Sunday although she is on opioids and no urinary changes.  He does endorse some anterior neck pain.    Physical Exam   BP: 132/89  Pulse: 118  Temp: 97.1  F (36.2  C)  Resp: 16  SpO2: 99 %      Physical Exam   General: alert, oriented to person, place, time  Head: atraumatic  Eyes: PERRL, corneas clear and conjunctivae clear  Nose: no rhinorrhea/nasal  discharge  Mouth/Throat: no exudates, no erythema, no tonsillar enlargement, structures midline and no hoarseness.  No erythematous or swelling under tongue  Neck: Anterior Steri-Strips in place there is 1 suture on her right inferior to her jawline.  There is some soft tissue swelling under the submandibular area.  Slightly firm to the touch.  Chest/Pulmonary: chest clear with equal lung sounds bilaterally, no chest wall tenderness or deformities, no tachypnea and no cyanosis  Cardiovascular: S1, S2 normal, regular rate and rhythm, no murmur and no pedal edema  Abdomen: soft, non-tender, no guarding, no rebound tenderness  Back/Spine:  No tenderness, no step-offs, no obvious deformities.  Musculoskeletal/Extremities: normal extremities, no edema, erythema, tenderness and 5/5 strength in all 4 extremities  Skin: no diaphoresis and skin color normal  Neuro: speech clear and gait stable  Psychiatric: affect/mood normal, cooperative, normal judgement/insight and memory intact      ED Course        MDM: Differential diagnosis includes but not limited to: Operative pain, systemic infection, Lela's angina, submandibular swelling, oropharynx swelling, neck swelling, and abscess.    At this point, will obtain a CBC, BMP and lactate to check for any signs systemic infection will also obtain a CT of her neck which according to CT will include inferior to her ears as well as her submandibular area.  We will give 5 mg of oxycodone.           Results for orders placed or performed during the hospital encounter of 12/22/21 (from the past 24 hour(s))   CBC with platelets differential    Narrative    The following orders were created for panel order CBC with platelets differential.  Procedure                               Abnormality         Status                     ---------                               -----------         ------                     CBC with platelets and d...[[id number]]                      Final result                  Please view results for these tests on the individual orders.   Lactic acid whole blood   Result Value Ref [facility]    Lactic Acid 0.8 0.7 - 2.0 mmol/L   Basic metabolic panel   Result Value Ref [facility]    Sodium 138 133 - 144 mmol/L    Potassium 4.0 3.4 - 5.3 mmol/L    Chloride 105 94 - 109 mmol/L    Carbon Dioxide (CO2) 29 20 - 32 mmol/L    Anion Gap 4 3 - 14 mmol/L    Urea Nitrogen 17 7 - 30 mg/dL    Creatinine 0.67 0.52 - 1.04 mg/dL    Calcium 9.3 8.5 - 10.1 mg/dL    Glucose 103 (H) 70 - 99 mg/dL    GFR Estimate >90 >60 mL/min/1.73m2   CBC with platelets and differential   Result Value Ref [facility]    WBC Count 8.8 4.0 - 11.0 10e3/uL    RBC Count 4.59 3.80 - 5.20 10e6/uL    Hemoglobin 13.6 11.7 - 15.7 g/dL    Hematocrit 40.5 35.0 - 47.0 %    MCV 88 78 - 100 fL    MCH 29.6 26.5 - 33.0 pg    MCHC 33.6 31.5 - 36.5 g/dL    RDW 12.2 10.0 - 15.0 %    Platelet Count 321 150 - 450 10e3/uL    % Neutrophils 56 %    % Lymphocytes 31 %    % Monocytes 7 %    % Eosinophils 5 %    % Basophils 1 %    % Immature Granulocytes 0 %    NRBCs per 100 WBC 0 <1 /100    Absolute Neutrophils 4.9 1.6 - 8.3 10e3/uL    Absolute Lymphocytes 2.8 0.8 - 5.3 10e3/uL    Absolute Monocytes 0.6 0.0 - 1.3 10e3/uL    Absolute Eosinophils 0.4 0.0 - 0.7 10e3/uL    Absolute Basophils 0.1 0.0 - 0.2 10e3/uL    Absolute Immature Granulocytes 0.0 <=0.4 10e3/uL    Absolute NRBCs 0.0 10e3/uL   Soft tissue neck CT w contrast    Narrative    PROCEDURE: CT SOFT TISSUE NECK W CONTRAST    HISTORY: Soft tissue mass, neck, no prior imaging    TECHNIQUE: Following the administration of intravenous contrast,  helical straight and angled axial images of the neck were obtained.  Multiplanar reformatted images were reviewed.    COMPARISON: None.    FINDINGS:    Postoperative changes of recent C4-C7 anterior cervical discectomy and  fusion are seen. The hardware appears appropriately positioned.  Cervical lordosis is straightened. A tiny focus of gas within the  C4-5  intervertebral space may reflect recent postoperative timing.    Width in the prevertebral/danger space extending anteriorly along the  surgical approach, there is a peripherally enhancing intermediate  density fluid collection spanning 2.8 x 2.1 x 5.4 cm. This results in  mild displacement of the esophagus. The airway remains patent.  Prevertebral fluid does not appear to extend to the level of the  oropharynx or into the superior mediastinum.    No pathologic adenopathy is identified. No acute fracture is seen.  Multiple missing teeth are noted. The lung apices are clear.       Impression    IMPRESSION:    Recent prior C4 C7 anterior cervical discectomy and fusion.     Prevertebral/danger space peripherally enhancing intermediate density  fluid collection extending anteriorly along the surgical approach.  Differential considerations include hematoma or seroma. Superimposed  infection is not excluded in the appropriate clinical context.     There is associated mass effect upon the esophagus. The airway remains  patent. Close clinical follow-up is recommended.    NOLA BARNES MD         SYSTEM ID:  [id number]       Medications   oxyCODONE (ROXICODONE) tablet 5 mg (5 mg Oral Given 12/22/21 1440)   iopamidol (ISOVUE-370) solution 75 mL (75 mLs Intravenous Given 12/22/21 1510)   sodium chloride (PF) 0.9% PF flush 60 mL (50 mLs Intravenous Given 12/22/21 1510)   dexamethasone PF (DECADRON) injection 10 mg (10 mg Intravenous Given 12/22/21 1657)       Assessments & Plan (with Medical Decision Making)     I have reviewed the nursing notes.    I have reviewed the findings, diagnosis, plan and need for follow up with the patient.      New Prescriptions    DEXAMETHASONE (DECADRON) 2 MG TABLET    Take 3 tabs by mouth daily x 3 days, then 2 tabs daily x 3 days, then 1 tab daily x 3 days, then 1/2 tab daily x 3 days.       Final diagnoses:   Postoperative abscess       12/22/2021   [facility]      Modesto Guerin PA-C  12/22/21 1434       Modesto Guerin PA-C  12/22/21 1709

## 2021-12-22 NOTE — PROGRESS NOTES
Care Transitions focused note:      This patient is on a medicaid [employer] plan and potentially has access to case management.  This might be a good consideration since patient states she is struggling and has a difficult time at home.    Left message with case management at [facility].  Will expect a return call tomorrow
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
## 2021-12-30 ENCOUNTER — CARE COORDINATION (OUTPATIENT)
Dept: CARE COORDINATION | Facility: OTHER | Age: 51
End: 2021-12-30
Payer: COMMERCIAL

## 2021-12-30 NOTE — PROGRESS NOTES
Care Transitions focused note:      Placed call to [employer]  while patient was in the ED.  Connected with  ([phone]) and discussed patients case and situation to see if they could provide additional support and assistance for patient.  Informed  what brought patient to the ED.  She will be reaching out to patient and following.

## 2022-04-18 ENCOUNTER — HOSPITAL ENCOUNTER (EMERGENCY)
Facility: HOSPITAL | Age: 52
Discharge: HOME | End: 2022-04-18
Attending: EMERGENCY MEDICINE | Admitting: EMERGENCY MEDICINE
Payer: COMMERCIAL

## 2022-04-18 VITALS
HEART RATE: 98 BPM | TEMPERATURE: 98.8 F | RESPIRATION RATE: 18 BRPM | HEIGHT: 62 IN | SYSTOLIC BLOOD PRESSURE: 135 MMHG | BODY MASS INDEX: 19.51 KG/M2 | WEIGHT: 106 LBS | OXYGEN SATURATION: 98 % | DIASTOLIC BLOOD PRESSURE: 84 MMHG

## 2022-04-18 DIAGNOSIS — M54.2 NECK PAIN: ICD-10-CM

## 2022-04-18 LAB
ANION GAP SERPL CALCULATED.3IONS-SCNC: 3 MMOL/L (ref 3–14)
BASOPHILS # BLD AUTO: 0.1 10E3/UL (ref 0–0.2)
BASOPHILS NFR BLD AUTO: 1 %
BUN SERPL-MCNC: 11 MG/DL (ref 7–30)
CALCIUM SERPL-MCNC: 9.2 MG/DL (ref 8.5–10.1)
CHLORIDE BLD-SCNC: 105 MMOL/L (ref 94–109)
CO2 SERPL-SCNC: 30 MMOL/L (ref 20–32)
CREAT SERPL-MCNC: 0.81 MG/DL (ref 0.52–1.04)
EOSINOPHIL # BLD AUTO: 0.2 10E3/UL (ref 0–0.7)
EOSINOPHIL NFR BLD AUTO: 3 %
ERYTHROCYTE [DISTWIDTH] IN BLOOD BY AUTOMATED COUNT: 12.4 % (ref 10–15)
ERYTHROCYTE [SEDIMENTATION RATE] IN BLOOD BY WESTERGREN METHOD: 9 MM/HR (ref 0–30)
GFR SERPL CREATININE-BSD FRML MDRD: 88 ML/MIN/1.73M2
GLUCOSE BLD-MCNC: 105 MG/DL (ref 70–99)
HCT VFR BLD AUTO: 42.3 % (ref 35–47)
HGB BLD-MCNC: 13.9 G/DL (ref 11.7–15.7)
IMM GRANULOCYTES # BLD: 0 10E3/UL
IMM GRANULOCYTES NFR BLD: 0 %
LYMPHOCYTES # BLD AUTO: 3.1 10E3/UL (ref 0.8–5.3)
LYMPHOCYTES NFR BLD AUTO: 40 %
MCH RBC QN AUTO: 28.6 PG (ref 26.5–33)
MCHC RBC AUTO-ENTMCNC: 32.9 G/DL (ref 31.5–36.5)
MCV RBC AUTO: 87 FL (ref 78–100)
MONOCYTES # BLD AUTO: 0.7 10E3/UL (ref 0–1.3)
MONOCYTES NFR BLD AUTO: 9 %
NEUTROPHILS # BLD AUTO: 3.7 10E3/UL (ref 1.6–8.3)
NEUTROPHILS NFR BLD AUTO: 47 %
NRBC # BLD AUTO: 0 10E3/UL
NRBC BLD AUTO-RTO: 0 /100
PLATELET # BLD AUTO: 343 10E3/UL (ref 150–450)
POTASSIUM BLD-SCNC: 4 MMOL/L (ref 3.4–5.3)
PROCALCITONIN SERPL-MCNC: <0.05 NG/ML
RBC # BLD AUTO: 4.86 10E6/UL (ref 3.8–5.2)
SODIUM SERPL-SCNC: 138 MMOL/L (ref 133–144)
WBC # BLD AUTO: 7.7 10E3/UL (ref 4–11)

## 2022-04-18 PROCEDURE — 250N000013 HC RX MED GY IP 250 OP 250 PS 637: Performed by: EMERGENCY MEDICINE

## 2022-04-18 PROCEDURE — 85025 COMPLETE CBC W/AUTO DIFF WBC: CPT | Performed by: EMERGENCY MEDICINE

## 2022-04-18 PROCEDURE — 80048 BASIC METABOLIC PNL TOTAL CA: CPT | Performed by: EMERGENCY MEDICINE

## 2022-04-18 PROCEDURE — 99284 EMERGENCY DEPT VISIT MOD MDM: CPT | Performed by: EMERGENCY MEDICINE

## 2022-04-18 PROCEDURE — 36415 COLL VENOUS BLD VENIPUNCTURE: CPT | Performed by: EMERGENCY MEDICINE

## 2022-04-18 PROCEDURE — 250N000009 HC RX 250: Performed by: EMERGENCY MEDICINE

## 2022-04-18 PROCEDURE — 84145 PROCALCITONIN (PCT): CPT | Performed by: EMERGENCY MEDICINE

## 2022-04-18 PROCEDURE — 85652 RBC SED RATE AUTOMATED: CPT | Performed by: EMERGENCY MEDICINE

## 2022-04-18 PROCEDURE — 99283 EMERGENCY DEPT VISIT LOW MDM: CPT

## 2022-04-18 RX ORDER — DEXAMETHASONE SODIUM PHOSPHATE 10 MG/ML
10 INJECTION INTRAMUSCULAR; INTRAVENOUS ONCE
Status: COMPLETED | OUTPATIENT
Start: 2022-04-18 | End: 2022-04-18

## 2022-04-18 RX ORDER — IBUPROFEN 800 MG/1
800 TABLET, FILM COATED ORAL ONCE
Status: COMPLETED | OUTPATIENT
Start: 2022-04-18 | End: 2022-04-18

## 2022-04-18 RX ORDER — METHYLPREDNISOLONE 4 MG
TABLET, DOSE PACK ORAL
Qty: 21 TABLET | Refills: 0 | Status: SHIPPED | OUTPATIENT
Start: 2022-04-18 | End: 2022-08-10

## 2022-04-18 RX ADMIN — DEXAMETHASONE SODIUM PHOSPHATE 10 MG: 10 INJECTION INTRAMUSCULAR; INTRAVENOUS at 19:32

## 2022-04-18 RX ADMIN — IBUPROFEN 800 MG: 800 TABLET ORAL at 19:33

## 2022-04-18 NOTE — ED TRIAGE NOTES
Reports surgery to cervical spine 12/2021 where discs were removed and fusion done. Posterior neck pain/ burning started 5 days ago that is now radiating down right arm. Tingling in hands bilaterally, new since pain started as well as some nerve pain. Reports recent family stress that she feels is causing tension in neck.

## 2022-04-19 ASSESSMENT — ENCOUNTER SYMPTOMS
CHILLS: 0
COUGH: 0
SHORTNESS OF BREATH: 0
FEVER: 0

## 2022-04-19 NOTE — ED PROVIDER NOTES
History     Chief Complaint   Patient presents with     Neck Pain     Radiates down right arm. C4-C7 surgery and fusion 12/2021     HPI  Eugenia Earl is a 52 year old female who is here w/ neck pain. Had cervical procedure 4-5 months ago. Pain began aroudn the time she realized her daughter had run away, went to her families house who did not tell her about it, then was told by family that she ran away from them. Has tingling to her right arm. No numbness. Not worse w/ movement. No fever, chills, weakness, headache or other symptoms.    Allergies:  Allergies   Allergen Reactions     Nka [No Known Allergies]        Problem List:    Patient Active Problem List    Diagnosis Date Noted     Transplant donor evaluation 08/29/2018     Priority: Medium     Post-operative state 11/10/2017     Priority: Medium     Status post laparoscopic assisted vaginal hysterectomy 09/28/2017     Priority: Medium     Surgery, elective 09/27/2017     Priority: Medium     Compression of lumbar nerve root 02/28/2017     Priority: Medium     Endometriosis of uterus 05/10/2001     Priority: Medium     Dysmenorrhea 04/16/2001     Priority: Medium        Past Medical History:    Past Medical History:   Diagnosis Date     Dysmenorrhea 04/16/2001     Endometriosis of uterus 05/10/2001     Endometriosis, site unspecified 06/18/2001     Follow-up examination, following other surgery 10/01/2008     Follow-up examination, following unspecified surgery 04/19/2001     Nonallopathic lesion of cervical region, not elsewhere classified 12/19/2001       Past Surgical History:    Past Surgical History:   Procedure Laterality Date     LAMINECTOMY LUMBAR POSTERIOR MICROSCOPIC ONE LEVEL N/A 9/30/2015    Procedure: LAMINECTOMY LUMBAR POSTERIOR MICROSCOPIC ONE LEVEL;  Surgeon: Danyel Kaba MD;  Location: [facility]     LAPAROSCOPIC ASSISTED HYSTERECTOMY VAGINAL, BILATERAL SALPINGO-OOPHORECTOMY, COMBINED Left 9/27/2017    Procedure: COMBINED LAPAROSCOPIC  "ASSISTED HYSTERECTOMY VAGINAL, SALPINGO-OOPHORECTOMY;  LAPAROSCOPIC ASSISTED VAGINAL HYSTERECTOMY, BILATERAL SALPINGECTOMY, LEFT OOPHORECTOMY, Cystoscopy;  Surgeon: Chapo Szymanski MD;  Location: [facility]     TONSILLECTOMY       TUBAL LIGATION         Family History:    Family History   Problem Relation Age of Onset     Diabetes Mother      Coronary Artery Disease Father      Hyperlipidemia Father        Social History:  Marital Status:  Single [1]  Social History     Tobacco Use     Smoking status: Former Smoker     Smokeless tobacco: Never Used   Substance Use Topics     Alcohol use: Yes     Comment: occasionally     Drug use: Yes     Types: Marijuana     Comment: 3 times weeks         Medications:    cyclobenzaprine (FLEXERIL) 10 MG tablet  methylPREDNISolone (MEDROL DOSEPAK) 4 MG tablet therapy pack          Review of Systems   Constitutional: Negative for chills and fever.   Respiratory: Negative for cough and shortness of breath.    All other systems reviewed and are negative.      Physical Exam   BP: 121/93  Pulse: 119  Temp: 98.8  F (37.1  C)  Resp: 16  Height: 157.5 cm (5' 2\")  Weight: 48.1 kg (106 lb)  SpO2: 97 %      Physical Exam  Constitutional:       General: She is not in acute distress.     Appearance: She is not diaphoretic.   HENT:      Head: Normocephalic and atraumatic.      Right Ear: External ear normal.      Left Ear: External ear normal.      Nose: No congestion or rhinorrhea.      Mouth/Throat:      Pharynx: Oropharynx is clear. No oropharyngeal exudate.   Eyes:      General: No scleral icterus.     Pupils: Pupils are equal, round, and reactive to light.   Neck:      Vascular: No carotid bruit.   Cardiovascular:      Rate and Rhythm: Normal rate and regular rhythm.      Heart sounds: Normal heart sounds.   Pulmonary:      Effort: No respiratory distress.      Breath sounds: Normal breath sounds.   Abdominal:      General: Bowel sounds are normal.      Palpations: Abdomen is soft.      " Tenderness: There is no abdominal tenderness.   Musculoskeletal:         General: No tenderness.      Cervical back: Normal range of motion and neck supple. No rigidity or tenderness.      Right lower leg: No edema.      Left lower leg: No edema.   Skin:     General: Skin is warm.      Capillary Refill: Capillary refill takes less than 2 seconds.      Findings: No rash.   Neurological:      General: No focal deficit present.      Mental Status: She is oriented to person, place, and time. Mental status is at baseline.      Cranial Nerves: No cranial nerve deficit.      Sensory: No sensory deficit.      Motor: No weakness.      Coordination: Coordination normal.      Gait: Gait normal.      Comments: Sensation equal to b/l UE, 5/5  strength   Psychiatric:         Mood and Affect: Mood normal.         Behavior: Behavior normal.         ED Course                 Procedures             Critical Care time:               Results for orders placed or performed during the hospital encounter of 04/18/22 (from the past 24 hour(s))   CBC with platelets differential    Narrative    The following orders were created for panel order CBC with platelets differential.  Procedure                               Abnormality         Status                     ---------                               -----------         ------                     CBC with platelets and d...[[id number]]                      Final result                 Please view results for these tests on the individual orders.   Basic metabolic panel   Result Value Ref Range    Sodium 138 133 - 144 mmol/L    Potassium 4.0 3.4 - 5.3 mmol/L    Chloride 105 94 - 109 mmol/L    Carbon Dioxide (CO2) 30 20 - 32 mmol/L    Anion Gap 3 3 - 14 mmol/L    Urea Nitrogen 11 7 - 30 mg/dL    Creatinine 0.81 0.52 - 1.04 mg/dL    Calcium 9.2 8.5 - 10.1 mg/dL    Glucose 105 (H) 70 - 99 mg/dL    GFR Estimate 88 >60 mL/min/1.73m2   Erythrocyte sedimentation rate auto   Result Value Ref  Range    Erythrocyte Sedimentation Rate 9 0 - 30 mm/hr   Procalcitonin   Result Value Ref Range    Procalcitonin <0.05 <0.05 ng/mL   CBC with platelets and differential   Result Value Ref Range    WBC Count 7.7 4.0 - 11.0 10e3/uL    RBC Count 4.86 3.80 - 5.20 10e6/uL    Hemoglobin 13.9 11.7 - 15.7 g/dL    Hematocrit 42.3 35.0 - 47.0 %    MCV 87 78 - 100 fL    MCH 28.6 26.5 - 33.0 pg    MCHC 32.9 31.5 - 36.5 g/dL    RDW 12.4 10.0 - 15.0 %    Platelet Count 343 150 - 450 10e3/uL    % Neutrophils 47 %    % Lymphocytes 40 %    % Monocytes 9 %    % Eosinophils 3 %    % Basophils 1 %    % Immature Granulocytes 0 %    NRBCs per 100 WBC 0 <1 /100    Absolute Neutrophils 3.7 1.6 - 8.3 10e3/uL    Absolute Lymphocytes 3.1 0.8 - 5.3 10e3/uL    Absolute Monocytes 0.7 0.0 - 1.3 10e3/uL    Absolute Eosinophils 0.2 0.0 - 0.7 10e3/uL    Absolute Basophils 0.1 0.0 - 0.2 10e3/uL    Absolute Immature Granulocytes 0.0 <=0.4 10e3/uL    Absolute NRBCs 0.0 10e3/uL       Medications   ibuprofen (ADVIL/MOTRIN) tablet 800 mg (800 mg Oral Given 4/18/22 1933)   dexamethasone (DECADRON) injectable solution used ORALLY 10 mg (10 mg Oral Given 4/18/22 1932)       Assessments & Plan (with Medical Decision Making)     I have reviewed the nursing notes.    I have reviewed the findings, diagnosis, plan and need for follow up with the patient.  51 yo f here w/ neck pain. No weakness to suggest acute cord compression. No fever or elevated inflammatory markers to suggest abscess. Likely psychosomatic which was discussed w/ pt and we both agree. Medrol dose pack after decadron in ED.    Discharge Medication List as of 4/18/2022  8:30 PM      START taking these medications    Details   methylPREDNISolone (MEDROL DOSEPAK) 4 MG tablet therapy pack Follow package directions and take tablets up to 4 times daily, Disp-21 tablet, R-0, [facility]             Final diagnoses:   Neck pain       4/18/2022   [facility]     Herve Chatterjee MD  04/19/22  [id number]

## 2022-05-14 ENCOUNTER — HEALTH MAINTENANCE LETTER (OUTPATIENT)
Age: 52
End: 2022-05-14

## 2022-08-10 ENCOUNTER — APPOINTMENT (OUTPATIENT)
Dept: ULTRASOUND IMAGING | Facility: HOSPITAL | Age: 52
End: 2022-08-10
Attending: NURSE PRACTITIONER
Payer: COMMERCIAL

## 2022-08-10 ENCOUNTER — HOSPITAL ENCOUNTER (EMERGENCY)
Facility: HOSPITAL | Age: 52
Discharge: HOME | End: 2022-08-10
Attending: NURSE PRACTITIONER | Admitting: NURSE PRACTITIONER
Payer: COMMERCIAL

## 2022-08-10 ENCOUNTER — NURSE TRIAGE (OUTPATIENT)
Dept: FAMILY MEDICINE | Facility: OTHER | Age: 52
End: 2022-08-10

## 2022-08-10 VITALS
HEART RATE: 92 BPM | TEMPERATURE: 98.1 F | RESPIRATION RATE: 16 BRPM | SYSTOLIC BLOOD PRESSURE: 154 MMHG | DIASTOLIC BLOOD PRESSURE: 93 MMHG | OXYGEN SATURATION: 99 %

## 2022-08-10 DIAGNOSIS — R10.31 RIGHT GROIN PAIN: Primary | ICD-10-CM

## 2022-08-10 PROCEDURE — G0463 HOSPITAL OUTPT CLINIC VISIT: HCPCS

## 2022-08-10 PROCEDURE — 99213 OFFICE O/P EST LOW 20 MIN: CPT | Performed by: NURSE PRACTITIONER

## 2022-08-10 PROCEDURE — 76705 ECHO EXAM OF ABDOMEN: CPT

## 2022-08-10 ASSESSMENT — ENCOUNTER SYMPTOMS
VOMITING: 0
FEVER: 0
ROS GI COMMENTS: RIGHT GROIN PAIN
ABDOMINAL PAIN: 0
CHILLS: 0
PSYCHIATRIC NEGATIVE: 1
SHORTNESS OF BREATH: 0
NAUSEA: 0
DIARRHEA: 0

## 2022-08-10 NOTE — ED TRIAGE NOTES
Patient presents to urgent care in her right groin that causing pain x5 days.  Patient has issues with her low back and bilateral hips. Patient reports she had a Cortizone shot in her right hip on 6/28 and she states it hasn't helped. Pain 10/10.

## 2022-08-10 NOTE — ED PROVIDER NOTES
History     Chief Complaint   Patient presents with     Groin Pain     HPI  Eugenia Earl is a 52 year old female who presents to urgent care today ambulatory with complaints of right groin pain that started 5 days ago, unknown cause.  History of cortisone injection to right hip on 6/28/2022.  History of lower back and bilateral hip pain.  No known fall, injury or trauma.  Denies any recent illness or infection.  Denies any fever, chills, nausea, vomiting, diarrhea, shortness of breath or chest pain.  Attempted cool pack to area, no other treatment attempted.  No other concerns.    Allergies:  Allergies   Allergen Reactions     Nka [No Known Allergies]        Problem List:    Patient Active Problem List    Diagnosis Date Noted     Transplant donor evaluation 08/29/2018     Priority: Medium     Post-operative state 11/10/2017     Priority: Medium     Status post laparoscopic assisted vaginal hysterectomy 09/28/2017     Priority: Medium     Surgery, elective 09/27/2017     Priority: Medium     Compression of lumbar nerve root 02/28/2017     Priority: Medium     Endometriosis of uterus 05/10/2001     Priority: Medium     Dysmenorrhea 04/16/2001     Priority: Medium        Past Medical History:    Past Medical History:   Diagnosis Date     Dysmenorrhea 04/16/2001     Endometriosis of uterus 05/10/2001     Endometriosis, site unspecified 06/18/2001     Follow-up examination, following other surgery 10/01/2008     Follow-up examination, following unspecified surgery 04/19/2001     Nonallopathic lesion of cervical region, not elsewhere classified 12/19/2001       Past Surgical History:    Past Surgical History:   Procedure Laterality Date     LAMINECTOMY LUMBAR POSTERIOR MICROSCOPIC ONE LEVEL N/A 9/30/2015    Procedure: LAMINECTOMY LUMBAR POSTERIOR MICROSCOPIC ONE LEVEL;  Surgeon: Danyel Kaba MD;  Location: [facility]     LAPAROSCOPIC ASSISTED HYSTERECTOMY VAGINAL, BILATERAL SALPINGO-OOPHORECTOMY, COMBINED Left  9/27/2017    Procedure: COMBINED LAPAROSCOPIC ASSISTED HYSTERECTOMY VAGINAL, SALPINGO-OOPHORECTOMY;  LAPAROSCOPIC ASSISTED VAGINAL HYSTERECTOMY, BILATERAL SALPINGECTOMY, LEFT OOPHORECTOMY, Cystoscopy;  Surgeon: Chapo Szymanski MD;  Location: [facility]     TONSILLECTOMY       TUBAL LIGATION         Family History:    Family History   Problem Relation Age of Onset     Diabetes Mother      Coronary Artery Disease Father      Hyperlipidemia Father        Social History:  Marital Status:  Single [1]  Social History     Tobacco Use     Smoking status: Former Smoker     Smokeless tobacco: Never Used   Substance Use Topics     Alcohol use: Yes     Comment: occasionally     Drug use: Yes     Types: Marijuana     Comment: 3 times weeks         Medications:    cyclobenzaprine (FLEXERIL) 10 MG tablet      Review of Systems   Constitutional: Negative for chills and fever.   Respiratory: Negative for shortness of breath.    Cardiovascular: Negative for chest pain.   Gastrointestinal: Negative for abdominal pain, diarrhea, nausea and vomiting.        Right groin pain   Musculoskeletal: Negative for gait problem.   Skin: Negative.    Psychiatric/Behavioral: Negative.      Physical Exam   BP: 154/93  Pulse: 92  Temp: 98.1  F (36.7  C)  Resp: 16  SpO2: 99 %    Physical Exam  Vitals and nursing note reviewed.   Constitutional:       General: She is not in acute distress.     Appearance: Normal appearance. She is not ill-appearing or toxic-appearing.   Cardiovascular:      Rate and Rhythm: Normal rate and regular rhythm.      Pulses: Normal pulses.      Heart sounds: Normal heart sounds.   Pulmonary:      Effort: Pulmonary effort is normal.      Breath sounds: Normal breath sounds.   Abdominal:      General: Bowel sounds are normal.      Palpations: Abdomen is soft. There is no mass.      Tenderness: There is no abdominal tenderness. There is no guarding.      Hernia: No hernia is present.   Neurological:      Mental Status: She is alert.    Psychiatric:         Mood and Affect: Mood normal.       ED Course     Results for orders placed or performed during the hospital encounter of 08/10/22 (from the past 24 hour(s))   US Hernia Evaluation    Narrative    Ultrasound of the right groin    HISTORY: Right groin mass    findings: There are normal-sized right inguinal lymph nodes. No  inguinal hernia is seen.      Impression    IMPRESSION: No right groin masses or hernias are seen.    JOSE GARCIA MD         SYSTEM ID:  [id number]       Medications - No data to display    Assessments & Plan (with Medical Decision Making)     I have reviewed the nursing notes.    I have reviewed the findings, diagnosis, plan and need for follow up with the patient.  (R10.31) Right groin pain  (primary encounter diagnosis)  Plan:   Patient ambulatory without difficulty.  Nontoxic appearance.  Denies any fever, chills, nausea, vomiting, diarrhea, shortness of breath or chest pain.  Patient arrived with complaints of a lump to right groin, no lump felt upon palpation.  No skin abnormalities to groin.  Denies any STD risk.  Denies any dysuria or hematuria.  Denies any vaginal bleeding/discharge.  Ultrasound hernia evaluation completed and impression shows no right groin masses or hernias are seen.  Patient states she arrived today as her friend made her nervous and told her to come in, do not see anything concerning at this time.  Symptoms consistent with possible groin strain.  Patient declines any further work-up at this time, wants to monitor and follow-up as needed.  Alternate Tylenol and ibuprofen as needed for pain.  Alternate heat and cold to area of discomfort.  Patient to return to urgent care-ED with any worsening in condition or additional concerns.  Patient is in agreement with treatment plan.    Discharge Medication List as of 8/10/2022 12:53 PM        Final diagnoses:   Right groin pain     8/10/2022   [facility]     Zakiya Mcelroy NP  08/10/22  [id number]

## 2022-08-10 NOTE — ED TRIAGE NOTES
Pt presents with c/o a lump in her right groin that has been causing pain for the past 5 days.

## 2022-09-04 ENCOUNTER — HEALTH MAINTENANCE LETTER (OUTPATIENT)
Age: 52
End: 2022-09-04

## 2022-12-21 ENCOUNTER — APPOINTMENT (OUTPATIENT)
Dept: GENERAL RADIOLOGY | Facility: HOSPITAL | Age: 52
End: 2022-12-21
Attending: NURSE PRACTITIONER
Payer: COMMERCIAL

## 2022-12-21 ENCOUNTER — HOSPITAL ENCOUNTER (EMERGENCY)
Facility: HOSPITAL | Age: 52
Discharge: HOME | End: 2022-12-21
Attending: NURSE PRACTITIONER | Admitting: NURSE PRACTITIONER
Payer: COMMERCIAL

## 2022-12-21 VITALS
DIASTOLIC BLOOD PRESSURE: 77 MMHG | TEMPERATURE: 98.2 F | HEART RATE: 123 BPM | OXYGEN SATURATION: 100 % | RESPIRATION RATE: 16 BRPM | SYSTOLIC BLOOD PRESSURE: 121 MMHG

## 2022-12-21 DIAGNOSIS — M25.512 ACUTE PAIN OF LEFT SHOULDER: ICD-10-CM

## 2022-12-21 PROCEDURE — 73030 X-RAY EXAM OF SHOULDER: CPT | Mod: LT

## 2022-12-21 PROCEDURE — 99213 OFFICE O/P EST LOW 20 MIN: CPT | Performed by: NURSE PRACTITIONER

## 2022-12-21 PROCEDURE — 250N000013 HC RX MED GY IP 250 OP 250 PS 637: Performed by: NURSE PRACTITIONER

## 2022-12-21 PROCEDURE — G0463 HOSPITAL OUTPT CLINIC VISIT: HCPCS | Mod: 25

## 2022-12-21 PROCEDURE — 96372 THER/PROPH/DIAG INJ SC/IM: CPT | Performed by: NURSE PRACTITIONER

## 2022-12-21 PROCEDURE — 250N000011 HC RX IP 250 OP 636: Performed by: NURSE PRACTITIONER

## 2022-12-21 PROCEDURE — 71046 X-RAY EXAM CHEST 2 VIEWS: CPT

## 2022-12-21 RX ORDER — TIZANIDINE 2 MG/1
2 TABLET ORAL 3 TIMES DAILY PRN
Qty: 12 TABLET | Refills: 0 | Status: SHIPPED | OUTPATIENT
Start: 2022-12-21 | End: 2023-01-02

## 2022-12-21 RX ORDER — TIZANIDINE 2 MG/1
2 TABLET ORAL ONCE
Status: COMPLETED | OUTPATIENT
Start: 2022-12-21 | End: 2022-12-21

## 2022-12-21 RX ORDER — KETOROLAC TROMETHAMINE 30 MG/ML
30 INJECTION, SOLUTION INTRAMUSCULAR; INTRAVENOUS ONCE
Status: COMPLETED | OUTPATIENT
Start: 2022-12-21 | End: 2022-12-21

## 2022-12-21 RX ADMIN — TIZANIDINE 2 MG: 2 TABLET ORAL at 13:22

## 2022-12-21 RX ADMIN — KETOROLAC TROMETHAMINE 30 MG: 30 INJECTION, SOLUTION INTRAMUSCULAR; INTRAVENOUS at 13:22

## 2022-12-21 ASSESSMENT — ENCOUNTER SYMPTOMS
LIGHT-HEADEDNESS: 0
NECK PAIN: 1
NUMBNESS: 1
VOMITING: 0
DIZZINESS: 0
HEADACHES: 0
FEVER: 0
NAUSEA: 1
CHILLS: 0
ACTIVITY CHANGE: 1
ARTHRALGIAS: 1

## 2022-12-21 ASSESSMENT — ACTIVITIES OF DAILY LIVING (ADL): ADLS_ACUITY_SCORE: 33

## 2022-12-21 NOTE — ED PROVIDER NOTES
History     Chief Complaint   Patient presents with     Shoulder Pain     HPI  Eugenia Earl is a 52 year old female who is accompanied per her niece.  She presents with a 2-day history of left shoulder and left upper anterior chest and neck discomfort.  Accompanied with numbness in her left arm and pain/numbness across her upper, superior chest region.  Is nauseated because of the pain.  Has applied ice and topical analgesia without relief of her discomfort.  Was moving furniture previous to her shoulder and chest discomfort, otherwise, no known trauma or overuse.  Cervical plates and screws placed December, 2021.  Surgery performed anteriorly through her neck.  Has had similar discomfort on her right side where the rib was out of place but never on her left.  Quit smoking 15 years ago.  Denies fevers, chills, vomiting, chest pain, headaches, dizziness, and lightheadedness.    Musculoskeletal problem/pain      Duration: two days    Description  Location:  Left shoulder    Intensity:  10/10 constant hurts    Accompanying signs and symptoms: radiation of pain to across upper chest and numbness    History  Previous similar problem: YES- on the right side but never on left  Previous evaluation:  none    Precipitating or alleviating factors:  Trauma or overuse: YES- moving furniture  Aggravating factors include: lifting and movement    Therapies tried and outcome: ice and deep heating rub did not help         Allergies:  Allergies   Allergen Reactions     Nka [No Known Allergies]        Problem List:    Patient Active Problem List    Diagnosis Date Noted     Transplant donor evaluation 08/29/2018     Priority: Medium     Post-operative state 11/10/2017     Priority: Medium     Status post laparoscopic assisted vaginal hysterectomy 09/28/2017     Priority: Medium     Surgery, elective 09/27/2017     Priority: Medium     Compression of lumbar nerve root 02/28/2017     Priority: Medium     Endometriosis of uterus  05/10/2001     Priority: Medium     Dysmenorrhea 04/16/2001     Priority: Medium        Past Medical History:    Past Medical History:   Diagnosis Date     Dysmenorrhea 04/16/2001     Endometriosis of uterus 05/10/2001     Endometriosis, site unspecified 06/18/2001     Follow-up examination, following other surgery 10/01/2008     Follow-up examination, following unspecified surgery 04/19/2001     Nonallopathic lesion of cervical region, not elsewhere classified 12/19/2001       Past Surgical History:    Past Surgical History:   Procedure Laterality Date     LAMINECTOMY LUMBAR POSTERIOR MICROSCOPIC ONE LEVEL N/A 9/30/2015    Procedure: LAMINECTOMY LUMBAR POSTERIOR MICROSCOPIC ONE LEVEL;  Surgeon: Danyel Kaba MD;  Location: [facility]     LAPAROSCOPIC ASSISTED HYSTERECTOMY VAGINAL, BILATERAL SALPINGO-OOPHORECTOMY, COMBINED Left 9/27/2017    Procedure: COMBINED LAPAROSCOPIC ASSISTED HYSTERECTOMY VAGINAL, SALPINGO-OOPHORECTOMY;  LAPAROSCOPIC ASSISTED VAGINAL HYSTERECTOMY, BILATERAL SALPINGECTOMY, LEFT OOPHORECTOMY, Cystoscopy;  Surgeon: Chapo Szymanski MD;  Location: [facility]     TONSILLECTOMY       TUBAL LIGATION         Family History:    Family History   Problem Relation Age of Onset     Diabetes Mother      Coronary Artery Disease Father      Hyperlipidemia Father        Social History:  Marital Status:  Single [1]  Social History     Tobacco Use     Smoking status: Former     Smokeless tobacco: Never   Substance Use Topics     Alcohol use: Yes     Comment: occasionally     Drug use: Yes     Types: Marijuana     Comment: 3 times weeks         Medications:    cyclobenzaprine (FLEXERIL) 10 MG tablet          Review of Systems   Constitutional: Positive for activity change. Negative for chills and fever.   Cardiovascular: Negative for chest pain.   Gastrointestinal: Positive for nausea (from pain). Negative for vomiting.   Musculoskeletal: Positive for arthralgias (left shoulder) and neck pain.   Neurological:  Positive for numbness (left arm). Negative for dizziness, light-headedness and headaches.       Physical Exam   BP: 121/77  Pulse: (!) 123  Temp: 98.2  F (36.8  C)  Resp: 16  SpO2: 100 %      Physical Exam  Vitals and nursing note reviewed.   Constitutional:       General: She is in acute distress.      Appearance: She is normal weight.   Neck:     Cardiovascular:      Rate and Rhythm: Regular rhythm. Tachycardia present.      Heart sounds: Normal heart sounds. No murmur heard.  Pulmonary:      Effort: Pulmonary effort is normal. No respiratory distress.      Breath sounds: Normal breath sounds. No wheezing or rales.   Chest:      Chest wall: Tenderness present.       Musculoskeletal:      Left shoulder: Tenderness present. Decreased range of motion. Decreased strength. Normal pulse.      Cervical back: Muscular tenderness present. No spinous process tenderness.   Skin:     General: Skin is warm and dry.      Findings: No bruising or erythema.   Neurological:      Mental Status: She is alert and oriented to person, place, and time.   Psychiatric:         Behavior: Behavior normal.         ED Course                 Procedures             Results for orders placed or performed during the hospital encounter of 12/21/22 (from the past 24 hour(s))   XR Shoulder Left G/E 3 Views    Narrative    PROCEDURE:  XR SHOULDER LEFT G/E 3 VIEWS    HISTORY: Left anterior shoulder pain with decreased range of motion    COMPARISON:  None.    TECHNIQUE:  4 view left shoulder    FINDINGS:  No fracture or dislocation is identified. No suspicious  osseous lesion. Mild degenerative changes of the acromioclavicular  joint. Glenohumeral joint space is preserved. Visualized left lung is  clear. Postsurgical changes of anterior cervical discectomy and  fusion.       Impression    IMPRESSION:   No acute osseous abnormality.    SHIREEN BARRAGAN MD         SYSTEM ID:  [id number]   Chest XR,  PA & LAT    Narrative    PROCEDURE:  XR CHEST 2  VIEWS    HISTORY: Upper chest and left shoulder discomfort.  History of  displaced ribs right side of chest.  Feels similar.  Moving furniture  2 days previous.  History of neck surgery with placement of plates and  screws performed anteriorly    COMPARISON:  Radiographs 10/18/2020    FINDINGS: PA and lateral chest radiographs  Cardiomediastinal silhouette is within normal limits.  No focal consolidation, effusion or pneumothorax.    No suspicious osseous lesion or subdiaphragmatic free air. Anterior  cervical fusion changes.      Impression    IMPRESSION:    No acute cardiopulmonary process.      SHIREEN BARRAGAN MD         SYSTEM ID:  [id number]       Medications   ketorolac (TORADOL) injection 30 mg (30 mg Intramuscular Given 12/21/22 1322)   tiZANidine (ZANAFLEX) tablet 2 mg (2 mg Oral Given 12/21/22 1322)       Assessments & Plan (with Medical Decision Making)     I have reviewed the nursing notes.    I have reviewed the findings, diagnosis, plan and need for follow up with the patient.  (M25.512) Acute pain of left shoulder  Comment: 52 year old female who is accompanied per her niece.  She presents with a 2-day history of left shoulder and left upper anterior chest and neck discomfort.  Accompanied with numbness in her left arm and pain/numbness across her upper, superior chest region.  Is nauseated because of the pain.  Has applied ice and topical analgesia without relief of her discomfort.  Was moving furniture previous to her shoulder and chest discomfort, otherwise, no known trauma or overuse.  Cervical plates and screws placed December, 2021.  Surgery performed anteriorly through her neck.  Has had similar discomfort on her right side where the rib was out of place but never on her left.  Quit smoking 15 years ago.  Denies fevers, chills, vomiting, chest pain, headaches, dizziness, and lightheadedness.    MDM:NHT. Lungs CTA    Ketorolac 30 mg IM and tizanidine 2 mg given orally in urgent care that did decrease  her discomfort and improved her ability to move her left arm    Chest x-ray reviewed and per radiology; no acute cardiopulmonary process  Left shoulder x-ray reviewed and per radiology; no acute osseous abnormality    Plan: Education provided for arthralgia.   Ice to affected area 20 minutes every hour as needed for comfort. After 48 hours you can apply heat.  After 6:30 PM tonight may start; ibuprofen 600 to 800 mg (3 - 4 tabs of over the counter med) every six to eight hours as needed;not to exceed maximum amount of 3200 mg in 24 hours. Take with food. Tylenol 650 to 1000 mg every four to six hours as needed (not to exceed more than 4000 mg in a 24 hour period). May use interchangeably. Suggest medicating around the clock for the next 24-48 hours. Use left arm sling until you can move your left arm without having discomfort.  Slowly start to wiggle your hand/fingers and move left arm as often as possible but not beyond the point of pain. Follow up with primary provider or orthopedics as needed    Do not drive motor vehicles or operate heavy equipment while taking muscle relaxors.    Return to ER/urgent care for worsening of symptoms and/or shortness of breath  These discharge instructions and medications were reviewed with her and her neice understanding verbalized.    This document was prepared using a combination of typing and voice generated software.  While every attempt was made for accuracy, spelling and grammatical errors may exist.    New Prescriptions    No medications on file       Final diagnoses:   Acute pain of left shoulder       12/21/2022   [facility]       Debby Sanders, CNP  12/21/22 2217

## 2022-12-21 NOTE — ED TRIAGE NOTES
Pt presents with c/o left shoulder pain for the p[ast 2 days that she believes is from moving furniture.

## 2022-12-21 NOTE — DISCHARGE INSTRUCTIONS
Ice to affected area 20 minutes every hour as needed for comfort. After 48 hours you can apply heat.  After 6:30 PM tonight may start; ibuprofen 600 to 800 mg (3 - 4 tabs of over the counter med) every six to eight hours as needed;not to exceed maximum amount of 3200 mg in 24 hours. Take with food. Tylenol 650 to 1000 mg every four to six hours as needed (not to exceed more than 4000 mg in a 24 hour period). May use interchangeably. Suggest medicating around the clock for the next 24-48 hours. Use left arm sling until you can move your left arm without having discomfort.  Slowly start to wiggle your hand/fingers and move left arm as often as possible but not beyond the point of pain. Follow up with primary provider or orthopedics as needed    Do not drive motor vehicles or operate heavy equipment while taking muscle relaxors.    Return to ER/urgent care for worsening of symptoms and/or shortness of breath

## 2022-12-21 NOTE — ED TRIAGE NOTES
Pt presents with left shoulder from moving furniture a few days ago. Pt states some tingling in left hand and pain has started to radiate to pectoral region. Pt had c4-c7 surgery last December. Pt rates current pain 10/10.

## 2023-06-03 ENCOUNTER — HEALTH MAINTENANCE LETTER (OUTPATIENT)
Age: 53
End: 2023-06-03

## 2023-10-01 ENCOUNTER — HEALTH MAINTENANCE LETTER (OUTPATIENT)
Age: 53
End: 2023-10-01

## 2023-12-17 ENCOUNTER — HOSPITAL ENCOUNTER (EMERGENCY)
Facility: HOSPITAL | Age: 53
Discharge: HOME | End: 2023-12-17
Attending: EMERGENCY MEDICINE | Admitting: EMERGENCY MEDICINE
Payer: COMMERCIAL

## 2023-12-17 ENCOUNTER — APPOINTMENT (OUTPATIENT)
Dept: ULTRASOUND IMAGING | Facility: HOSPITAL | Age: 53
End: 2023-12-17
Attending: EMERGENCY MEDICINE
Payer: COMMERCIAL

## 2023-12-17 ENCOUNTER — APPOINTMENT (OUTPATIENT)
Dept: CT IMAGING | Facility: HOSPITAL | Age: 53
End: 2023-12-17
Attending: EMERGENCY MEDICINE
Payer: COMMERCIAL

## 2023-12-17 ENCOUNTER — NURSE TRIAGE (OUTPATIENT)
Dept: NURSING | Facility: CLINIC | Age: 53
End: 2023-12-17

## 2023-12-17 VITALS
SYSTOLIC BLOOD PRESSURE: 145 MMHG | HEART RATE: 109 BPM | RESPIRATION RATE: 18 BRPM | TEMPERATURE: 98.3 F | OXYGEN SATURATION: 99 % | DIASTOLIC BLOOD PRESSURE: 81 MMHG | BODY MASS INDEX: 17.92 KG/M2 | WEIGHT: 98 LBS

## 2023-12-17 DIAGNOSIS — R20.8 COLD FOOT WITHOUT PERIPHERAL VASCULAR DISEASE: ICD-10-CM

## 2023-12-17 LAB
ALBUMIN SERPL BCG-MCNC: 4.1 G/DL (ref 3.5–5.2)
ALP SERPL-CCNC: 75 U/L (ref 40–150)
ALT SERPL W P-5'-P-CCNC: 13 U/L (ref 0–50)
ANION GAP SERPL CALCULATED.3IONS-SCNC: 14 MMOL/L (ref 7–15)
AST SERPL W P-5'-P-CCNC: 15 U/L (ref 0–45)
BASOPHILS # BLD AUTO: 0.1 10E3/UL (ref 0–0.2)
BASOPHILS NFR BLD AUTO: 1 %
BILIRUB SERPL-MCNC: 0.4 MG/DL
BUN SERPL-MCNC: 17.5 MG/DL (ref 6–20)
CALCIUM SERPL-MCNC: 9.2 MG/DL (ref 8.6–10)
CHLORIDE SERPL-SCNC: 100 MMOL/L (ref 98–107)
CK SERPL-CCNC: 50 U/L (ref 26–192)
CREAT SERPL-MCNC: 0.68 MG/DL (ref 0.51–0.95)
DEPRECATED HCO3 PLAS-SCNC: 21 MMOL/L (ref 22–29)
EGFRCR SERPLBLD CKD-EPI 2021: >90 ML/MIN/1.73M2
EOSINOPHIL # BLD AUTO: 0.8 10E3/UL (ref 0–0.7)
EOSINOPHIL NFR BLD AUTO: 7 %
ERYTHROCYTE [DISTWIDTH] IN BLOOD BY AUTOMATED COUNT: 12.9 % (ref 10–15)
GLUCOSE SERPL-MCNC: 114 MG/DL (ref 70–99)
HCT VFR BLD AUTO: 34.8 % (ref 35–47)
HGB BLD-MCNC: 11.9 G/DL (ref 11.7–15.7)
HOLD SPECIMEN: NORMAL
IMM GRANULOCYTES # BLD: 0 10E3/UL
IMM GRANULOCYTES NFR BLD: 0 %
INR PPP: 0.98 (ref 0.85–1.15)
LACTATE SERPL-SCNC: 1.1 MMOL/L (ref 0.7–2)
LYMPHOCYTES # BLD AUTO: 3.3 10E3/UL (ref 0.8–5.3)
LYMPHOCYTES NFR BLD AUTO: 30 %
MCH RBC QN AUTO: 29.8 PG (ref 26.5–33)
MCHC RBC AUTO-ENTMCNC: 34.2 G/DL (ref 31.5–36.5)
MCV RBC AUTO: 87 FL (ref 78–100)
MONOCYTES # BLD AUTO: 0.7 10E3/UL (ref 0–1.3)
MONOCYTES NFR BLD AUTO: 7 %
NEUTROPHILS # BLD AUTO: 6.1 10E3/UL (ref 1.6–8.3)
NEUTROPHILS NFR BLD AUTO: 55 %
NRBC # BLD AUTO: 0 10E3/UL
NRBC BLD AUTO-RTO: 0 /100
PLATELET # BLD AUTO: 522 10E3/UL (ref 150–450)
POTASSIUM SERPL-SCNC: 4.1 MMOL/L (ref 3.4–5.3)
PROT SERPL-MCNC: 6.7 G/DL (ref 6.4–8.3)
RBC # BLD AUTO: 4 10E6/UL (ref 3.8–5.2)
SODIUM SERPL-SCNC: 135 MMOL/L (ref 135–145)
WBC # BLD AUTO: 11 10E3/UL (ref 4–11)

## 2023-12-17 PROCEDURE — 85610 PROTHROMBIN TIME: CPT | Performed by: EMERGENCY MEDICINE

## 2023-12-17 PROCEDURE — 36415 COLL VENOUS BLD VENIPUNCTURE: CPT | Performed by: EMERGENCY MEDICINE

## 2023-12-17 PROCEDURE — 83605 ASSAY OF LACTIC ACID: CPT | Performed by: EMERGENCY MEDICINE

## 2023-12-17 PROCEDURE — 99284 EMERGENCY DEPT VISIT MOD MDM: CPT | Performed by: EMERGENCY MEDICINE

## 2023-12-17 PROCEDURE — 82550 ASSAY OF CK (CPK): CPT | Performed by: EMERGENCY MEDICINE

## 2023-12-17 PROCEDURE — 250N000011 HC RX IP 250 OP 636: Performed by: EMERGENCY MEDICINE

## 2023-12-17 PROCEDURE — 99285 EMERGENCY DEPT VISIT HI MDM: CPT | Mod: 25 | Performed by: EMERGENCY MEDICINE

## 2023-12-17 PROCEDURE — 75635 CT ANGIO ABDOMINAL ARTERIES: CPT

## 2023-12-17 PROCEDURE — 82040 ASSAY OF SERUM ALBUMIN: CPT | Performed by: EMERGENCY MEDICINE

## 2023-12-17 PROCEDURE — 85025 COMPLETE CBC W/AUTO DIFF WBC: CPT | Performed by: EMERGENCY MEDICINE

## 2023-12-17 RX ORDER — IOPAMIDOL 755 MG/ML
98 INJECTION, SOLUTION INTRAVASCULAR ONCE
Status: COMPLETED | OUTPATIENT
Start: 2023-12-17 | End: 2023-12-17

## 2023-12-17 RX ADMIN — IOPAMIDOL 98 ML: 755 INJECTION, SOLUTION INTRAVENOUS at 20:34

## 2023-12-17 ASSESSMENT — ACTIVITIES OF DAILY LIVING (ADL): ADLS_ACUITY_SCORE: 35

## 2023-12-17 NOTE — TELEPHONE ENCOUNTER
"[facility] DR Villegas in [place]  Right total hip replacement 12/7--home 12/9 has not had PT or home care yet. .  The lower half of her calf of her surgical leg is painful, the leg is warm, but she cannot tell if it is swollen. Dull pain currently=\"8\". Taking oxycodone and celebrex for pain--\"it makes me fall asleep\", doesn't seem like it is working well for pain. She has decreased sensation in the top half of the leg: thigh all the way around. \"My right foot is cold. I walk with a walker.\"     Triaged to a disposition of Go to ED now. She agrees with this plan.     Lauren Liz RN Triage Nurse Advisor 5:24 PM 12/17/2023.  Reason for Disposition   Entire foot is cool or blue in comparison to other side    Additional Information   Negative: Looks like a broken bone or dislocated joint (e.g., crooked or deformed)   Negative: Sounds like a life-threatening emergency to the triager   Negative: Followed a leg injury   Negative: Leg swelling is main symptom   Negative: Back pain radiating (shooting) into leg(s)   Negative: Knee pain is main symptom   Negative: Ankle pain is main symptom   Negative: Pregnant   Negative: Postpartum (from 0 to 6 weeks after delivery)   Negative: Chest pain   Negative: Difficulty breathing    Protocols used: Leg Pain-A-AH    "

## 2023-12-18 ENCOUNTER — HOSPITAL ENCOUNTER (OUTPATIENT)
Dept: ULTRASOUND IMAGING | Facility: HOSPITAL | Age: 53
Discharge: HOME | End: 2023-12-18
Attending: EMERGENCY MEDICINE | Admitting: EMERGENCY MEDICINE
Payer: COMMERCIAL

## 2023-12-18 DIAGNOSIS — R20.8 COLD FOOT WITHOUT PERIPHERAL VASCULAR DISEASE: ICD-10-CM

## 2023-12-18 PROCEDURE — 93925 LOWER EXTREMITY STUDY: CPT

## 2023-12-18 NOTE — ED NOTES
"Pt c/o R groin pain that started today and R hip and R upper LE numbness. Pt also states her feet, bilaterally, are cold and \"prickly\" feeling. Pedal pulses are +1 and equal, cap refil <3 sec. Pt had R hip replacement on the 7th and she states these issues besides the groin pain have been present since surgery. Pt is A/Ox4 and ambulatory with walker.   "

## 2023-12-18 NOTE — ED TRIAGE NOTES
Patient presents with right hip numbness and warmth that's started yesterday and progressed in to today. Patient reports having a total hip done on 12/7/23 and right leg has been numb since. Patient reports numbness in left foot as well.

## 2023-12-18 NOTE — DISCHARGE INSTRUCTIONS
Come back if your foot feels more cold or if you have a decrease in sensation or any pain. A CT scan was done which showed normal blood flow to your right leg. This doppler study I ordered will help to see if there is a decrease in flow which is clinically relevant. They should call you in the morning with a time to come in for the ultrasound.

## 2023-12-19 ASSESSMENT — ENCOUNTER SYMPTOMS
SHORTNESS OF BREATH: 0
COUGH: 0
CHILLS: 0
FEVER: 0

## 2023-12-19 NOTE — ED PROVIDER NOTES
History     Chief Complaint   Patient presents with    Leg Pain    Cold Extremity     HPI  Eugenia Earl is a 53 year old female who is here with cold right foot.  Had a total right hip done over 10 days ago.  Since then has had very little sensation to that leg.  Yesterday night, noticed her foot felt cold.  That continued throughout the day.  Now thinks sensation is further decreased.    Allergies:  Allergies   Allergen Reactions    Nka [No Known Allergies]        Problem List:    Patient Active Problem List    Diagnosis Date Noted    Transplant donor evaluation 08/29/2018     Priority: Medium    Post-operative state 11/10/2017     Priority: Medium    Status post laparoscopic assisted vaginal hysterectomy 09/28/2017     Priority: Medium    Surgery, elective 09/27/2017     Priority: Medium    Compression of lumbar nerve root 02/28/2017     Priority: Medium    Endometriosis of uterus 05/10/2001     Priority: Medium    Dysmenorrhea 04/16/2001     Priority: Medium        Past Medical History:    Past Medical History:   Diagnosis Date    Dysmenorrhea 04/16/2001    Endometriosis of uterus 05/10/2001    Endometriosis, site unspecified 06/18/2001    Follow-up examination, following other surgery 10/01/2008    Follow-up examination, following unspecified surgery 04/19/2001    Nonallopathic lesion of cervical region, not elsewhere classified 12/19/2001       Past Surgical History:    Past Surgical History:   Procedure Laterality Date    LAMINECTOMY LUMBAR POSTERIOR MICROSCOPIC ONE LEVEL N/A 9/30/2015    Procedure: LAMINECTOMY LUMBAR POSTERIOR MICROSCOPIC ONE LEVEL;  Surgeon: Danyel Kaba MD;  Location: [facility]    LAPAROSCOPIC ASSISTED HYSTERECTOMY VAGINAL, BILATERAL SALPINGO-OOPHORECTOMY, COMBINED Left 9/27/2017    Procedure: COMBINED LAPAROSCOPIC ASSISTED HYSTERECTOMY VAGINAL, SALPINGO-OOPHORECTOMY;  LAPAROSCOPIC ASSISTED VAGINAL HYSTERECTOMY, BILATERAL SALPINGECTOMY, LEFT OOPHORECTOMY, Cystoscopy;  Surgeon: Stephon  Chapo WAY MD;  Location: [facility]    TONSILLECTOMY      TUBAL LIGATION         Family History:    Family History   Problem Relation Age of Onset    Diabetes Mother     Coronary Artery Disease Father     Hyperlipidemia Father        Social History:  Marital Status:  Single [1]  Social History     Tobacco Use    Smoking status: Former    Smokeless tobacco: Never   Substance Use Topics    Alcohol use: Yes     Comment: occasionally    Drug use: Yes     Types: Marijuana     Comment: 3 times weeks         Medications:    cyclobenzaprine (FLEXERIL) 10 MG tablet          Review of Systems   Constitutional:  Negative for chills and fever.   Respiratory:  Negative for cough and shortness of breath.    All other systems reviewed and are negative.      Physical Exam   BP: 155/84  Pulse: (!) 121  Temp: 98.3  F (36.8  C)  Resp: 18  Weight: 44.5 kg (98 lb)  SpO2: 99 %      Physical Exam  Constitutional:       General: She is not in acute distress.     Appearance: She is not diaphoretic.   HENT:      Head: Normocephalic and atraumatic.      Right Ear: External ear normal.      Left Ear: External ear normal.      Nose: No congestion or rhinorrhea.      Mouth/Throat:      Pharynx: Oropharynx is clear. No oropharyngeal exudate.   Eyes:      General: No scleral icterus.     Pupils: Pupils are equal, round, and reactive to light.   Cardiovascular:      Rate and Rhythm: Normal rate and regular rhythm.      Heart sounds: Normal heart sounds.   Pulmonary:      Effort: No respiratory distress.      Breath sounds: Normal breath sounds.   Abdominal:      General: Bowel sounds are normal.      Palpations: Abdomen is soft.      Tenderness: There is no abdominal tenderness.   Musculoskeletal:         General: No tenderness.      Cervical back: Normal range of motion and neck supple.      Right lower leg: No edema.      Left lower leg: No edema.      Comments: 1+ pretibial pulse, right foot noticeably colder than left foot, cap refill less than 2  seconds   Skin:     General: Skin is warm.      Capillary Refill: Capillary refill takes less than 2 seconds.      Findings: No rash.   Neurological:      Mental Status: Mental status is at baseline.      Cranial Nerves: No cranial nerve deficit.   Psychiatric:         Mood and Affect: Mood normal.         Behavior: Behavior normal.         ED Course                 Procedures    Results for orders placed during the hospital encounter of 12/17/23    POC US FOR DVT UNILATERAL LIMITED    Impression  [facility] Procedure Note    Limited Bedside ED Ultrasound for DVT:    PERFORMED BY: Dr. Herve Chatterjee MD  INDICATIONS:  leg pain  PROBE: High frequency linear probe  BODY LOCATION:  Right leg  FINDINGS:  Right:  Popliteal vein: Compressible  Thrombus:  Absent      INTERPRETATION:  The popliteal veins were identified and differentiated from the corresponding arteries.  Compression of the vein demonstrated no DVT and no thrombus was visualized in the veins. Popliteal artery had excellent waveform, markedly stronger compared to PT and DP  IMAGE DOCUMENTATION: Images were archived to PACs system           Critical Care time:               No results found for this or any previous visit (from the past 24 hour(s)).    Medications   sodium chloride (PF) 0.9% PF flush 150 mL (150 mLs Intravenous $Given 12/17/23 2034)   iopamidol (ISOVUE-370) solution 98 mL (98 mLs Intravenous $Given 12/17/23 2034)       Assessments & Plan (with Medical Decision Making)     I have reviewed the nursing notes.    I have reviewed the findings, diagnosis, plan and need for follow up with the patient.          Medical Decision Making  The patient's presentation was of moderate complexity (an undiagnosed new problem with uncertain diagnosis).    The patient's evaluation involved:  ordering and/or review of 3+ test(s) in this encounter (see separate area of note for details)    The patient's management necessitated only low risk  treatment.    53-year-old female here with cold right foot.  Initially concern for some maternal obstruction.  I did a bedside ultrasound, was able to find flow to pretibial and dorsalis pedis however dorsalis pedis markedly diminished.  Popliteal artery had perfectly fine flow.  Obtain CTA which showed no obstructions.  Given patient has perfusion, will put her in for outpatient Doppler study, if any acute findings notified they will call us and we will coordinate care.    Discharge Medication List as of 12/17/2023  9:52 PM          Final diagnoses:   Cold foot without peripheral vascular disease       12/17/2023   [facility]       Herve Chatterjee MD  12/19/23 [id number]

## 2024-03-27 ENCOUNTER — APPOINTMENT (OUTPATIENT)
Dept: GENERAL RADIOLOGY | Facility: HOSPITAL | Age: 54
End: 2024-03-27
Attending: NURSE PRACTITIONER
Payer: COMMERCIAL

## 2024-03-27 ENCOUNTER — HOSPITAL ENCOUNTER (EMERGENCY)
Facility: HOSPITAL | Age: 54
Discharge: HOME | End: 2024-03-27
Attending: NURSE PRACTITIONER | Admitting: NURSE PRACTITIONER
Payer: COMMERCIAL

## 2024-03-27 VITALS
HEIGHT: 65 IN | SYSTOLIC BLOOD PRESSURE: 122 MMHG | HEART RATE: 106 BPM | WEIGHT: 98.11 LBS | BODY MASS INDEX: 16.35 KG/M2 | TEMPERATURE: 98.9 F | OXYGEN SATURATION: 97 % | RESPIRATION RATE: 16 BRPM | DIASTOLIC BLOOD PRESSURE: 77 MMHG

## 2024-03-27 DIAGNOSIS — M54.50 LOWER BACK PAIN: Primary | ICD-10-CM

## 2024-03-27 PROCEDURE — 96372 THER/PROPH/DIAG INJ SC/IM: CPT | Performed by: NURSE PRACTITIONER

## 2024-03-27 PROCEDURE — G0463 HOSPITAL OUTPT CLINIC VISIT: HCPCS | Mod: 25

## 2024-03-27 PROCEDURE — 73502 X-RAY EXAM HIP UNI 2-3 VIEWS: CPT

## 2024-03-27 PROCEDURE — 99213 OFFICE O/P EST LOW 20 MIN: CPT | Performed by: NURSE PRACTITIONER

## 2024-03-27 PROCEDURE — 250N000013 HC RX MED GY IP 250 OP 250 PS 637: Performed by: NURSE PRACTITIONER

## 2024-03-27 PROCEDURE — 250N000011 HC RX IP 250 OP 636: Performed by: NURSE PRACTITIONER

## 2024-03-27 PROCEDURE — 72100 X-RAY EXAM L-S SPINE 2/3 VWS: CPT

## 2024-03-27 RX ORDER — CEFADROXIL 500 MG/1
CAPSULE ORAL
COMMUNITY
Start: 2024-02-07 | End: 2024-07-19

## 2024-03-27 RX ORDER — OXYCODONE HYDROCHLORIDE 5 MG/1
TABLET ORAL
COMMUNITY
Start: 2023-12-15 | End: 2024-07-19

## 2024-03-27 RX ORDER — CELECOXIB 200 MG/1
CAPSULE ORAL
COMMUNITY
Start: 2023-12-29 | End: 2024-07-19

## 2024-03-27 RX ORDER — PREDNISONE 10 MG/1
30 TABLET ORAL DAILY
Qty: 30 TABLET | Refills: 0 | Status: SHIPPED | OUTPATIENT
Start: 2024-03-27 | End: 2024-04-01

## 2024-03-27 RX ORDER — ASPIRIN 81 MG
TABLET,CHEWABLE ORAL
COMMUNITY
Start: 2023-12-08 | End: 2024-07-19

## 2024-03-27 RX ORDER — LIDOCAINE 4 G/G
1 PATCH TOPICAL ONCE
Status: DISCONTINUED | OUTPATIENT
Start: 2024-03-27 | End: 2024-03-27 | Stop reason: HOSPADM

## 2024-03-27 RX ORDER — DOCUSATE SODIUM AND SENNOSIDES 8.6; 5 MG/1; MG/1
TABLET, FILM COATED ORAL
COMMUNITY
Start: 2023-12-08 | End: 2024-07-19

## 2024-03-27 RX ORDER — ACETAMINOPHEN 325 MG/1
650 TABLET ORAL
COMMUNITY
Start: 2023-12-08 | End: 2024-07-19

## 2024-03-27 RX ORDER — KETOROLAC TROMETHAMINE 15 MG/ML
15 INJECTION, SOLUTION INTRAMUSCULAR; INTRAVENOUS ONCE
Status: COMPLETED | OUTPATIENT
Start: 2024-03-27 | End: 2024-03-27

## 2024-03-27 RX ADMIN — KETOROLAC TROMETHAMINE 15 MG: 15 INJECTION, SOLUTION INTRAMUSCULAR; INTRAVENOUS at 20:24

## 2024-03-27 RX ADMIN — LIDOCAINE 1 PATCH: 4 PATCH TOPICAL at 20:24

## 2024-03-27 ASSESSMENT — ENCOUNTER SYMPTOMS
DYSURIA: 0
ABDOMINAL PAIN: 0
NECK PAIN: 0
FEVER: 0
VOMITING: 0
PSYCHIATRIC NEGATIVE: 1
NAUSEA: 0
MYALGIAS: 1
NECK STIFFNESS: 0
DIARRHEA: 0
HEMATURIA: 0
FREQUENCY: 0
SHORTNESS OF BREATH: 0
BACK PAIN: 1
CHILLS: 0

## 2024-03-27 ASSESSMENT — ACTIVITIES OF DAILY LIVING (ADL): ADLS_ACUITY_SCORE: 35

## 2024-03-28 NOTE — ED TRIAGE NOTES
"Pt presents with c/o right hip pain. Pain started 3 days. States she L4 and L5 shaved off for a herniated disc. States her hip feels like its \"slipping\". No otc meds.         "

## 2024-03-28 NOTE — ED PROVIDER NOTES
History     Chief Complaint   Patient presents with    Hip Pain     Right hip pain     HPI  Eugenia Earl is a 54 year old female who presents to urgent care today ambulatory with complaints of right lower back pain which has been ongoing for the past 4 days and right hip pain which occurs at times.  Denies any fall, injury or trauma.  Denies any bowel or bladder dysfunction.  Denies any saddle anesthesia.  Denies any numbness or weakness bilateral lower extremities not attributed to pain.  Denies any fever, chills, nausea, vomiting, diarrhea, shortness of breath or chest pain.  Denies any dysuria, frequency or hematuria.  Patient took an old Celebrex earlier today for pain, ran out of tizanidine.  No other concerns.    Allergies:  Allergies   Allergen Reactions    Nka [No Known Allergies]        Problem List:    Patient Active Problem List    Diagnosis Date Noted    Transplant donor evaluation 08/29/2018     Priority: Medium    Post-operative state 11/10/2017     Priority: Medium    Status post laparoscopic assisted vaginal hysterectomy 09/28/2017     Priority: Medium    Surgery, elective 09/27/2017     Priority: Medium    Compression of lumbar nerve root 02/28/2017     Priority: Medium    Endometriosis of uterus 05/10/2001     Priority: Medium    Dysmenorrhea 04/16/2001     Priority: Medium        Past Medical History:    Past Medical History:   Diagnosis Date    Dysmenorrhea 04/16/2001    Endometriosis of uterus 05/10/2001    Endometriosis, site unspecified 06/18/2001    Follow-up examination, following other surgery 10/01/2008    Follow-up examination, following unspecified surgery 04/19/2001    Nonallopathic lesion of cervical region, not elsewhere classified 12/19/2001       Past Surgical History:    Past Surgical History:   Procedure Laterality Date    LAMINECTOMY LUMBAR POSTERIOR MICROSCOPIC ONE LEVEL N/A 9/30/2015    Procedure: LAMINECTOMY LUMBAR POSTERIOR MICROSCOPIC ONE LEVEL;  Surgeon: Danyel Kaba  "MD Toni;  Location: [facility]    LAPAROSCOPIC ASSISTED HYSTERECTOMY VAGINAL, BILATERAL SALPINGO-OOPHORECTOMY, COMBINED Left 9/27/2017    Procedure: COMBINED LAPAROSCOPIC ASSISTED HYSTERECTOMY VAGINAL, SALPINGO-OOPHORECTOMY;  LAPAROSCOPIC ASSISTED VAGINAL HYSTERECTOMY, BILATERAL SALPINGECTOMY, LEFT OOPHORECTOMY, Cystoscopy;  Surgeon: Chapo Szymanski MD;  Location: [facility]    TONSILLECTOMY      TUBAL LIGATION         Family History:    Family History   Problem Relation Age of Onset    Diabetes Mother     Coronary Artery Disease Father     Hyperlipidemia Father        Social History:  Marital Status:  Single [1]  Social History     Tobacco Use    Smoking status: Former    Smokeless tobacco: Never   Substance Use Topics    Alcohol use: Yes     Comment: occasionally    Drug use: Yes     Types: Marijuana     Comment: 3 times weeks         Medications:    acetaminophen (TYLENOL) 325 MG tablet  ASPIRIN LOW DOSE 81 MG chewable tablet  cefadroxil (DURICEF) 500 MG capsule  celecoxib (CELEBREX) 200 MG capsule  cyclobenzaprine (FLEXERIL) 10 MG tablet  oxyCODONE (ROXICODONE) 5 MG tablet  predniSONE (DELTASONE) 10 MG tablet  STOOL SOFTENER/LAXATIVE 50-8.6 MG tablet  tiZANidine (ZANAFLEX) 4 MG tablet      Review of Systems   Constitutional:  Negative for chills and fever.   Respiratory:  Negative for shortness of breath.    Cardiovascular:  Negative for chest pain.   Gastrointestinal:  Negative for abdominal pain, diarrhea, nausea and vomiting.   Genitourinary:  Negative for decreased urine volume, dysuria, frequency and hematuria.   Musculoskeletal:  Positive for back pain and myalgias. Negative for gait problem, neck pain and neck stiffness.        Right hip pain   Skin: Negative.    Psychiatric/Behavioral: Negative.       Physical Exam   BP: 122/77  Pulse: 106  Temp: 98.9  F (37.2  C)  Resp: 16  Height: 165.1 cm (5' 5\")  Weight: 44.5 kg (98 lb 1.7 oz)  SpO2: 97 %  AP: 98    Physical Exam  Vitals and nursing note reviewed. "   Constitutional:       General: She is not in acute distress.     Appearance: Normal appearance. She is not ill-appearing or toxic-appearing.   Cardiovascular:      Rate and Rhythm: Normal rate and regular rhythm.      Pulses: Normal pulses.      Heart sounds: Normal heart sounds.   Pulmonary:      Effort: Pulmonary effort is normal.      Breath sounds: Normal breath sounds.   Musculoskeletal:      Cervical back: Normal.      Thoracic back: Normal.      Lumbar back: Tenderness present. No swelling, edema, deformity, signs of trauma, lacerations or spasms. Decreased range of motion.      Right hip: Tenderness present. No deformity, lacerations or crepitus. Normal range of motion. Normal strength.      Right ankle: Normal.   Skin:     General: Skin is warm and dry.      Capillary Refill: Capillary refill takes less than 2 seconds.   Neurological:      Mental Status: She is alert.   Psychiatric:         Mood and Affect: Mood normal.       ED Course     Procedures    No results found for this or any previous visit (from the past 24 hour(s)).    Medications   ketorolac (TORADOL) injection 15 mg (15 mg Intramuscular $Given 3/27/24 2024)     Assessments & Plan (with Medical Decision Making)     I have reviewed the nursing notes.    I have reviewed the findings, diagnosis, plan and need for follow up with the patient.  (M54.50) Lower back pain  Plan:   Patient ambulatory with a nontoxic appearance.  Patient will stand from seated position in order to ambulate.  Primary pain is right lower back, does not radiate.  Patient also has intermittent right hip pain.  Pain onset was 4 days ago.  Denies any fall, injury or trauma.  No skin abnormalities, bruising, swelling or signs of infection.  Decreased ROM of lower back.  No red flag symptoms.  Toradol and Lidoderm patch placed which was helpful for pain, patient states pain started to decrease.  Will prescribe short course of prednisone.  Topical anesthetics such as Bengay,  IcyHot, Biofreeze or Lidoderm patches as needed.  Alternate ice and heat.  Follow-up with primary care provider or return to urgent care/ED with any worsening in condition or additional concerns.  Patient in agreement treatment plan.    Discharge Medication List as of 3/27/2024  8:41 PM        START taking these medications    Details   predniSONE (DELTASONE) 10 MG tablet Take 3 tablets (30 mg) by mouth daily for 5 days, Disp-30 tablet, R-0, [facility]           Final diagnoses:   Lower back pain     3/27/2024   [facility]       Zakiya Mcelroy, LIZZIE  03/31/24 0848

## 2024-03-28 NOTE — DISCHARGE INSTRUCTIONS
Prednisone as ordered    Over-the-counter topical anesthetics such as Bengay, IcyHot, Biofreeze or Lidoderm patches as needed    Gentle stretching    Alternate ice and heat    Follow-up with primary care provider or return to urgent care/ED with any worsening in condition or additional concerns.

## 2024-07-07 ENCOUNTER — HEALTH MAINTENANCE LETTER (OUTPATIENT)
Age: 54
End: 2024-07-07

## 2024-07-19 ENCOUNTER — OFFICE VISIT (OUTPATIENT)
Dept: FAMILY MEDICINE | Facility: OTHER | Age: 54
End: 2024-07-19
Attending: NURSE PRACTITIONER
Payer: MEDICARE

## 2024-07-19 VITALS
SYSTOLIC BLOOD PRESSURE: 128 MMHG | WEIGHT: 99.5 LBS | HEART RATE: 107 BPM | DIASTOLIC BLOOD PRESSURE: 78 MMHG | TEMPERATURE: 99.1 F | RESPIRATION RATE: 20 BRPM | HEIGHT: 62 IN | OXYGEN SATURATION: 99 % | BODY MASS INDEX: 18.31 KG/M2

## 2024-07-19 DIAGNOSIS — Z12.31 ENCOUNTER FOR SCREENING MAMMOGRAM FOR BREAST CANCER: Primary | ICD-10-CM

## 2024-07-19 DIAGNOSIS — Z12.11 SCREENING FOR COLON CANCER: ICD-10-CM

## 2024-07-19 DIAGNOSIS — F34.1 DYSTHYMIA: ICD-10-CM

## 2024-07-19 DIAGNOSIS — H81.10 BENIGN PAROXYSMAL POSITIONAL VERTIGO, UNSPECIFIED LATERALITY: ICD-10-CM

## 2024-07-19 DIAGNOSIS — Z76.89 ENCOUNTER TO ESTABLISH CARE: ICD-10-CM

## 2024-07-19 PROBLEM — M50.121 CERVICAL DISC DISORDER AT C4-C5 LEVEL WITH RADICULOPATHY: Status: ACTIVE | Noted: 2018-03-26

## 2024-07-19 PROBLEM — M51.26 RECURRENT HERNIATION OF LUMBAR DISC: Status: ACTIVE | Noted: 2021-01-27

## 2024-07-19 PROBLEM — M62.81 MUSCLE WEAKNESS: Status: ACTIVE | Noted: 2019-10-17

## 2024-07-19 PROBLEM — M16.11 PRIMARY OSTEOARTHRITIS OF RIGHT HIP: Status: ACTIVE | Noted: 2023-11-09

## 2024-07-19 PROBLEM — G89.29 CHRONIC BILATERAL LOW BACK PAIN WITH RIGHT-SIDED SCIATICA: Status: ACTIVE | Noted: 2024-04-02

## 2024-07-19 PROBLEM — M51.9 LUMBAR DISC DISEASE: Status: ACTIVE | Noted: 2021-01-27

## 2024-07-19 PROBLEM — F43.10 PTSD (POST-TRAUMATIC STRESS DISORDER): Status: ACTIVE | Noted: 2020-07-15

## 2024-07-19 PROBLEM — M54.16 LUMBAR RADICULOPATHY: Status: ACTIVE | Noted: 2021-01-27

## 2024-07-19 PROBLEM — L40.4 PSORIASIS, GUTTATE: Status: ACTIVE | Noted: 2019-08-23

## 2024-07-19 PROBLEM — R73.01 ELEVATED FASTING GLUCOSE: Status: ACTIVE | Noted: 2018-12-13

## 2024-07-19 PROBLEM — F43.23 ADJUSTMENT DISORDER WITH MIXED ANXIETY AND DEPRESSED MOOD: Status: ACTIVE | Noted: 2022-01-11

## 2024-07-19 PROBLEM — M25.69 BACK STIFFNESS: Status: ACTIVE | Noted: 2024-04-02

## 2024-07-19 PROBLEM — M54.41 CHRONIC BILATERAL LOW BACK PAIN WITH RIGHT-SIDED SCIATICA: Status: ACTIVE | Noted: 2024-04-02

## 2024-07-19 PROBLEM — M48.02 CERVICAL SPINAL STENOSIS: Status: ACTIVE | Noted: 2017-02-28

## 2024-07-19 PROBLEM — M54.12 CERVICAL RADICULOPATHY: Status: ACTIVE | Noted: 2021-09-30

## 2024-07-19 PROBLEM — E78.00 HYPERCHOLESTEROLEMIA: Status: ACTIVE | Noted: 2018-12-13

## 2024-07-19 PROBLEM — M25.611 SHOULDER STIFFNESS, RIGHT: Status: ACTIVE | Noted: 2021-11-08

## 2024-07-19 PROBLEM — Z96.641 S/P TOTAL RIGHT HIP ARTHROPLASTY: Status: ACTIVE | Noted: 2023-12-07

## 2024-07-19 PROBLEM — G89.29 CHRONIC RIGHT SHOULDER PAIN: Status: ACTIVE | Noted: 2021-03-22

## 2024-07-19 PROBLEM — L40.9 PSORIASIS OF SCALP: Status: ACTIVE | Noted: 2019-10-15

## 2024-07-19 PROBLEM — F41.9 ANXIETY: Status: ACTIVE | Noted: 2020-06-16

## 2024-07-19 PROBLEM — M25.511 CHRONIC RIGHT SHOULDER PAIN: Status: ACTIVE | Noted: 2021-03-22

## 2024-07-19 PROCEDURE — G0463 HOSPITAL OUTPT CLINIC VISIT: HCPCS

## 2024-07-19 PROCEDURE — 99213 OFFICE O/P EST LOW 20 MIN: CPT | Performed by: NURSE PRACTITIONER

## 2024-07-19 PROCEDURE — G2211 COMPLEX E/M VISIT ADD ON: HCPCS | Performed by: NURSE PRACTITIONER

## 2024-07-19 RX ORDER — MECLIZINE HYDROCHLORIDE 25 MG/1
25 TABLET ORAL 3 TIMES DAILY PRN
Qty: 30 TABLET | Refills: 0 | Status: SHIPPED | OUTPATIENT
Start: 2024-07-19 | End: 2024-09-18

## 2024-07-19 ASSESSMENT — PATIENT HEALTH QUESTIONNAIRE - PHQ9
10. IF YOU CHECKED OFF ANY PROBLEMS, HOW DIFFICULT HAVE THESE PROBLEMS MADE IT FOR YOU TO DO YOUR WORK, TAKE CARE OF THINGS AT HOME, OR GET ALONG WITH OTHER PEOPLE: VERY DIFFICULT
SUM OF ALL RESPONSES TO PHQ QUESTIONS 1-9: 15
SUM OF ALL RESPONSES TO PHQ QUESTIONS 1-9: 15

## 2024-07-19 ASSESSMENT — PAIN SCALES - GENERAL: PAINLEVEL: MODERATE PAIN (5)

## 2024-07-19 NOTE — PROGRESS NOTES
Assessment & Plan     (Z12.31) Encounter for screening mammogram for breast cancer  (primary encounter diagnosis)  Comment: Due for a mammogram   Plan: MA Screen Bilateral w/Robinson            (Z12.11) Screening for colon cancer  Comment: Due for colon cancer screening   Plan: Colonoscopy Screening  Referral            (Z76.89) Encounter to establish care  Comment: Reviewed past medical, surgical, and family history. Medications reviewed. Care established   Plan: As above     (H81.10) Benign paroxysmal positional vertigo, unspecified laterality  Comment: Treat with Meclizine. Supportive care discussed. Discussed PT if symptoms don't improve   Plan: meclizine (ANTIVERT) 25 MG tablet            (F34.1) Dysthymia  Comment: Has been since age 11. She declined therapy or medication. She denied SI/HI thoughts. She has tried a variety of medication from previous PCP and nothing really helped   Plan: Continue to follow       The longitudinal plan of care for the diagnosis(es)/condition(s) as documented were addressed during this visit. Due to the added complexity in care, I will continue to support Eugenia in the subsequent management and with ongoing continuity of care.    Depression Screening Follow Up        7/19/2024     1:33 PM   PHQ   PHQ-9 Total Score 15   Q9: Thoughts of better off dead/self-harm past 2 weeks Not at all         7/19/2024     1:33 PM   Last PHQ-9   1.  Little interest or pleasure in doing things 2   2.  Feeling down, depressed, or hopeless 2   3.  Trouble falling or staying asleep, or sleeping too much 3   4.  Feeling tired or having little energy 2   5.  Poor appetite or overeating 2   6.  Feeling bad about yourself 1   7.  Trouble concentrating 2   8.  Moving slowly or restless 1   Q9: Thoughts of better off dead/self-harm past 2 weeks 0   PHQ-9 Total Score 15     Denies SI/HI thoughts.     Follow Up Actions Taken  Crisis resource information provided in After Visit Summary  Discussed therapy  and medication. She declined.         See Patient Instructions    Return in about 3 months (around 10/19/2024) for Fasting physical .    Jose Bello is a 54 year old, presenting for the following health issues:    Establish Care      7/19/2024     1:40 PM   Additional Questions   Roomed by Natali TYLER     History of Present Illness       Reason for visit:  Want her as my dr    She eats 0-1 servings of fruits and vegetables daily.She consumes 3 sweetened beverage(s) daily.She exercises with enough effort to increase her heart rate 9 or less minutes per day.  She exercises with enough effort to increase her heart rate 3 or less days per week.   She is taking medications regularly.       New Patient/Transfer of Care     Previous PCP: Dr. Yan Villegas     She has a daughter and a son.     Dizziness  Onset/Duration: 2 weeks  Description:   Do you feel faint: YES  Does it feel like the surroundings (bed, room) are moving: YES  Unsteady/off balance: YES  Have you passed out or fallen: No  Progression of Symptoms: same and intermittent  Accompanying Signs & Symptoms:  Heart palpitations or chest pain: No  Nausea, vomiting: YES  Weakness or lack of coordination in arms or legs: YES  Vision or speech changes: No  Numbness or tingling: No  Ringing in ears (Tinnitus): No  Hearing Loss: fish bowl  History:   Head trauma/concussion history: years ago  Previous similar symptoms: No, possible vertigo years ago and feels the same   Recent bleeding history: No  Any new medications (BP?): No  Precipitating factors:   Worse with activity: No  Worse with head movement: YES  Alleviating factors:   Does staying in a fixed position give relief: YES  Therapies tried and outcome: None    Health Care Directive  Patient does not have a Health Care Directive or Living Will: Discussed advance care planning with patient; information given to patient to review.        Review of Systems  Constitutional, HEENT, cardiovascular, pulmonary, GI, ,  "musculoskeletal, neuro, skin, endocrine and psych systems are negative, except as otherwise noted.      Objective    /78 (BP Location: Right arm, Patient Position: Sitting, Cuff Size: Adult Regular)   Pulse 107   Temp 99.1  F (37.3  C) (Tympanic)   Resp 20   Ht 1.562 m (5' 1.5\")   Wt 45.1 kg (99 lb 8 oz)   LMP 08/10/2017 (Approximate)   SpO2 99%   BMI 18.50 kg/m    Body mass index is 18.5 kg/m .  Physical Exam   GENERAL: alert and no distress  EYES: Eyes grossly normal to inspection, PERRL and conjunctivae and sclerae normal  HENT: ear canals and TM's normal, nose and mouth without ulcers or lesions  NECK: no adenopathy, no asymmetry, masses, or scars  RESP: lungs clear to auscultation - no rales, rhonchi or wheezes  CV: regular rate and rhythm, normal S1 S2, no S3 or S4, no murmur, click or rub, no peripheral edema  MS: no gross musculoskeletal defects noted, no edema  SKIN: no suspicious lesions or rashes  NEURO: Normal strength and tone, mentation intact and speech normal. CN II-XII grossly intact   PSYCH: mentation appears normal, affect normal/bright      Signed Electronically by: PHOENIX Valdez CNP    "

## 2024-09-10 ENCOUNTER — ANCILLARY PROCEDURE (OUTPATIENT)
Dept: GENERAL RADIOLOGY | Facility: OTHER | Age: 54
End: 2024-09-10
Attending: NURSE PRACTITIONER
Payer: MEDICARE

## 2024-09-10 ENCOUNTER — OFFICE VISIT (OUTPATIENT)
Dept: FAMILY MEDICINE | Facility: OTHER | Age: 54
End: 2024-09-10
Attending: NURSE PRACTITIONER
Payer: MEDICARE

## 2024-09-10 VITALS
HEIGHT: 61 IN | BODY MASS INDEX: 19.77 KG/M2 | DIASTOLIC BLOOD PRESSURE: 82 MMHG | SYSTOLIC BLOOD PRESSURE: 136 MMHG | OXYGEN SATURATION: 98 % | WEIGHT: 104.7 LBS | RESPIRATION RATE: 18 BRPM | TEMPERATURE: 97.7 F | HEART RATE: 77 BPM

## 2024-09-10 DIAGNOSIS — R73.09 ELEVATED GLUCOSE: Primary | ICD-10-CM

## 2024-09-10 DIAGNOSIS — M79.672 LEFT FOOT PAIN: ICD-10-CM

## 2024-09-10 DIAGNOSIS — N63.0 BREAST LUMP IN UPPER INNER QUADRANT: ICD-10-CM

## 2024-09-10 DIAGNOSIS — E78.00 HYPERCHOLESTEROLEMIA: ICD-10-CM

## 2024-09-10 DIAGNOSIS — Z12.83 SCREENING FOR SKIN CANCER: ICD-10-CM

## 2024-09-10 DIAGNOSIS — R73.09 ELEVATED GLUCOSE: ICD-10-CM

## 2024-09-10 DIAGNOSIS — Z00.00 ENCOUNTER FOR MEDICARE ANNUAL WELLNESS EXAM: Primary | ICD-10-CM

## 2024-09-10 DIAGNOSIS — Z12.11 SCREENING FOR COLON CANCER: ICD-10-CM

## 2024-09-10 LAB
ALBUMIN SERPL BCG-MCNC: 4.6 G/DL (ref 3.5–5.2)
ALBUMIN UR-MCNC: NEGATIVE MG/DL
ALP SERPL-CCNC: 105 U/L (ref 40–150)
ALT SERPL W P-5'-P-CCNC: 29 U/L (ref 0–50)
ANION GAP SERPL CALCULATED.3IONS-SCNC: 13 MMOL/L (ref 7–15)
APPEARANCE UR: CLEAR
AST SERPL W P-5'-P-CCNC: 34 U/L (ref 0–45)
BILIRUB SERPL-MCNC: 0.3 MG/DL
BILIRUB UR QL STRIP: NEGATIVE
BUN SERPL-MCNC: 10.2 MG/DL (ref 6–20)
CALCIUM SERPL-MCNC: 9.6 MG/DL (ref 8.8–10.4)
CHLORIDE SERPL-SCNC: 102 MMOL/L (ref 98–107)
CHOLEST SERPL-MCNC: 285 MG/DL
COLOR UR AUTO: YELLOW
CREAT SERPL-MCNC: 0.76 MG/DL (ref 0.51–0.95)
EGFRCR SERPLBLD CKD-EPI 2021: >90 ML/MIN/1.73M2
ERYTHROCYTE [DISTWIDTH] IN BLOOD BY AUTOMATED COUNT: 12.7 % (ref 10–15)
FASTING STATUS PATIENT QL REPORTED: YES
FASTING STATUS PATIENT QL REPORTED: YES
GLUCOSE SERPL-MCNC: 100 MG/DL (ref 70–99)
GLUCOSE UR STRIP-MCNC: NEGATIVE MG/DL
HCO3 SERPL-SCNC: 26 MMOL/L (ref 22–29)
HCT VFR BLD AUTO: 44.6 % (ref 35–47)
HDLC SERPL-MCNC: 66 MG/DL
HGB BLD-MCNC: 14.6 G/DL (ref 11.7–15.7)
HGB UR QL STRIP: NEGATIVE
KETONES UR STRIP-MCNC: NEGATIVE MG/DL
LDLC SERPL CALC-MCNC: 197 MG/DL
LEUKOCYTE ESTERASE UR QL STRIP: NEGATIVE
MCH RBC QN AUTO: 28.7 PG (ref 26.5–33)
MCHC RBC AUTO-ENTMCNC: 32.7 G/DL (ref 31.5–36.5)
MCV RBC AUTO: 88 FL (ref 78–100)
NITRATE UR QL: NEGATIVE
NONHDLC SERPL-MCNC: 219 MG/DL
PH UR STRIP: 7.5 [PH] (ref 5–7)
PLATELET # BLD AUTO: 368 10E3/UL (ref 150–450)
POTASSIUM SERPL-SCNC: 3.8 MMOL/L (ref 3.4–5.3)
PROT SERPL-MCNC: 7.6 G/DL (ref 6.4–8.3)
RBC # BLD AUTO: 5.09 10E6/UL (ref 3.8–5.2)
SODIUM SERPL-SCNC: 141 MMOL/L (ref 135–145)
SP GR UR STRIP: 1.01 (ref 1–1.03)
TRIGL SERPL-MCNC: 108 MG/DL
TSH SERPL DL<=0.005 MIU/L-ACNC: 1.06 UIU/ML (ref 0.3–4.2)
UROBILINOGEN UR STRIP-ACNC: 0.2 E.U./DL
WBC # BLD AUTO: 6.7 10E3/UL (ref 4–11)

## 2024-09-10 PROCEDURE — 73630 X-RAY EXAM OF FOOT: CPT | Mod: TC,LT,FY

## 2024-09-10 PROCEDURE — 85027 COMPLETE CBC AUTOMATED: CPT | Mod: ZL | Performed by: NURSE PRACTITIONER

## 2024-09-10 PROCEDURE — G0463 HOSPITAL OUTPT CLINIC VISIT: HCPCS

## 2024-09-10 PROCEDURE — 84460 ALANINE AMINO (ALT) (SGPT): CPT | Mod: ZL | Performed by: NURSE PRACTITIONER

## 2024-09-10 PROCEDURE — G0439 PPPS, SUBSEQ VISIT: HCPCS | Performed by: NURSE PRACTITIONER

## 2024-09-10 PROCEDURE — 80061 LIPID PANEL: CPT | Mod: ZL | Performed by: NURSE PRACTITIONER

## 2024-09-10 PROCEDURE — 84443 ASSAY THYROID STIM HORMONE: CPT | Mod: ZL | Performed by: NURSE PRACTITIONER

## 2024-09-10 PROCEDURE — 99214 OFFICE O/P EST MOD 30 MIN: CPT | Mod: 25 | Performed by: NURSE PRACTITIONER

## 2024-09-10 PROCEDURE — 82374 ASSAY BLOOD CARBON DIOXIDE: CPT | Mod: ZL | Performed by: NURSE PRACTITIONER

## 2024-09-10 PROCEDURE — 36415 COLL VENOUS BLD VENIPUNCTURE: CPT | Mod: ZL | Performed by: NURSE PRACTITIONER

## 2024-09-10 PROCEDURE — 83036 HEMOGLOBIN GLYCOSYLATED A1C: CPT | Mod: ZL | Performed by: NURSE PRACTITIONER

## 2024-09-10 PROCEDURE — 81003 URINALYSIS AUTO W/O SCOPE: CPT | Mod: ZL | Performed by: NURSE PRACTITIONER

## 2024-09-10 ASSESSMENT — PAIN SCALES - GENERAL: PAINLEVEL: EXTREME PAIN (8)

## 2024-09-10 ASSESSMENT — PATIENT HEALTH QUESTIONNAIRE - PHQ9: SUM OF ALL RESPONSES TO PHQ QUESTIONS 1-9: 12

## 2024-09-10 NOTE — PATIENT INSTRUCTIONS
Patient Education   Preventive Care Advice   This is general advice given by our system to help you stay healthy. However, your care team may have specific advice just for you. Please talk to your care team about your preventive care needs.  Nutrition  Eat 5 or more servings of fruits and vegetables each day.  Try wheat bread, brown rice and whole grain pasta (instead of white bread, rice, and pasta).  Get enough calcium and vitamin D. Check the label on foods and aim for 100% of the RDA (recommended daily allowance).  Lifestyle  Exercise at least 150 minutes each week  (30 minutes a day, 5 days a week).  Do muscle strengthening activities 2 days a week. These help control your weight and prevent disease.  No smoking.  Wear sunscreen to prevent skin cancer.  Have a dental exam and cleaning every 6 months.  Yearly exams  See your health care team every year to talk about:  Any changes in your health.  Any medicines your care team has prescribed.  Preventive care, family planning, and ways to prevent chronic diseases.  Shots (vaccines)   HPV shots (up to age 26), if you've never had them before.  Hepatitis B shots (up to age 59), if you've never had them before.  COVID-19 shot: Get this shot when it's due.  Flu shot: Get a flu shot every year.  Tetanus shot: Get a tetanus shot every 10 years.  Pneumococcal, hepatitis A, and RSV shots: Ask your care team if you need these based on your risk.  Shingles shot (for age 50 and up)  General health tests  Diabetes screening:  Starting at age 35, Get screened for diabetes at least every 3 years.  If you are younger than age 35, ask your care team if you should be screened for diabetes.  Cholesterol test: At age 39, start having a cholesterol test every 5 years, or more often if advised.  Bone density scan (DEXA): At age 50, ask your care team if you should have this scan for osteoporosis (brittle bones).  Hepatitis C: Get tested at least once in your life.  STIs (sexually  transmitted infections)  Before age 24: Ask your care team if you should be screened for STIs.  After age 24: Get screened for STIs if you're at risk. You are at risk for STIs (including HIV) if:  You are sexually active with more than one person.  You don't use condoms every time.  You or a partner was diagnosed with a sexually transmitted infection.  If you are at risk for HIV, ask about PrEP medicine to prevent HIV.  Get tested for HIV at least once in your life, whether you are at risk for HIV or not.  Cancer screening tests  Cervical cancer screening: If you have a cervix, begin getting regular cervical cancer screening tests starting at age 21.  Breast cancer scan (mammogram): If you've ever had breasts, begin having regular mammograms starting at age 40. This is a scan to check for breast cancer.  Colon cancer screening: It is important to start screening for colon cancer at age 45.  Have a colonoscopy test every 10 years (or more often if you're at risk) Or, ask your provider about stool tests like a FIT test every year or Cologuard test every 3 years.  To learn more about your testing options, visit:   .  For help making a decision, visit:   https://bit.ly/qs83768.  Prostate cancer screening test: If you have a prostate, ask your care team if a prostate cancer screening test (PSA) at age 55 is right for you.  Lung cancer screening: If you are a current or former smoker ages 50 to 80, ask your care team if ongoing lung cancer screenings are right for you.  For informational purposes only. Not to replace the advice of your health care provider. Copyright   2023 [facility]. All rights reserved. Clinically reviewed by the [facility] Transitions Program. [employer] 615254 - REV 01/24.     Lung Cancer Screening   Frequently Asked Questions  If you are at high-risk for lung cancer, getting screened with low-dose computed tomography (LDCT) every year can help save your life. This handout offers  answers to some of the most common questions about lung cancer screening. If you have other questions, please call [phone] ([phone]).     What is it?  Lung cancer screening uses special X-ray technology to create an image of your lung tissue. The exam is quick and easy and takes less than 10 seconds. We don t give you any medicine or use any needles. You can eat before and after the exam. You don t need to change your clothes as long as the clothing on your chest doesn t contain metal. But, you do need to be able to hold your breath for at least 6 seconds during the exam.    What is the goal of lung cancer screening?  The goal of lung cancer screening is to save lives. Many times, lung cancer is not found until a person starts having physical symptoms. Lung cancer screening can help detect lung cancer in the earliest stages when it may be easier to treat.    Who should be screened for lung cancer?  We suggest lung cancer screening for anyone who is at high-risk for lung cancer. You are in the high-risk group if you:      are between the ages of 55 and 79, and    have smoked at least 1 pack of cigarettes a day for 20 or more years, and    still smoke or have quit within the past 15 years.    However, if you have a new cough or shortness of breath, you should talk to your doctor before being screened.    Why does it matter if I have symptoms?  Certain symptoms can be a sign that you have a condition in your lungs that should be checked and treated by your doctor. These symptoms include fever, chest pain, a new or changing cough, shortness of breath that you have never felt before, coughing up blood or unexplained weight loss. Having any of these symptoms can greatly affect the results of lung cancer screening.       Should all smokers get an LDCT lung cancer screening exam?  It depends. Lung cancer screening is for a very specific group of men and women who have a history of heavy smoking over a long  period of time (see  Who should be screened for lung cancer  above).  I am in the high-risk group, but have been diagnosed with cancer in the past. Is LDCT lung cancer screening right for me?  In some cases, you should not have LDCT lung screening, such as when your doctor is already following your cancer with CT scan studies. Your doctor will help you decide if LDCT lung screening is right for you.  Do I need to have a screening exam every year?  Yes. If you are in the high-risk group described earlier, you should get an LDCT lung cancer screening exam every year until you are 79, or are no longer willing or able to undergo screening and possible procedures to diagnose and treat lung cancer.  How effective is LDCT at preventing death from lung cancer?  Studies have shown that LDCT lung cancer screening can lower the risk of death from lung cancer by 20 percent in people who are at high-risk.  What are the risks?  There are some risks and limitations of LDCT lung cancer screening. We want to make sure you understand the risks and benefits, so please let us know if you have any questions. Your doctor may want to talk with you more about these risks.    Radiation exposure: As with any exam that uses radiation, there is a very small increased risk of cancer. The amount of radiation in LDCT is small--about the same amount a person would get from a mammogram. Your doctor orders the exam when he or she feels the potential benefits outweigh the risks.    False negatives: No test is perfect, including LDCT. It is possible that you may have a medical condition, including lung cancer, that is not found during your exam. This is called a false negative result.    False positives and more testing: LDCT very often finds something in the lung that could be cancer, but in fact is not. This is called a false positive result. False positive tests often cause anxiety. To make sure these findings are not cancer, you may need to have  more tests. These tests will be done only if you give us permission. Sometimes patients need a treatment that can have side effects, such as a biopsy. For more information on false positives, see  What can I expect from the results?     Findings not related to lung cancer: Your LDCT exam also takes pictures of areas of your body next to your lungs. In a very small number of cases, the CT scan will show an abnormal finding in one of these areas, such as your kidneys, adrenal glands, liver or thyroid. This finding may not be serious, but you may need more tests. Your doctor can help you decide what other tests you may need, if any.  What can I expect from the results?  About 1 out of 4 LDCT exams will find something that may need more tests. Most of the time, these findings are lung nodules. Lung nodules are very small collections of tissue in the lung. These nodules are very common, and the vast majority--more than 97 percent--are not cancer (benign). Most are normal lymph nodes or small areas of scarring from past infections.  But, if a small lung nodule is found to be cancer, the cancer can be cured more than 90 percent of the time. To know if the nodule is cancer, we may need to get more images before your next yearly screening exam. If the nodule has suspicious features (for example, it is large, has an odd shape or grows over time), we will refer you to a specialist for further testing.  Will my doctor also get the results?  Yes. Your doctor will get a copy of your results.  Is it okay to keep smoking now that there s a cancer screening exam?  No. Tobacco is one of the strongest cancer-causing agents. It causes not only lung cancer, but other cancers and cardiovascular (heart) diseases as well. The damage caused by smoking builds over time. This means that the longer you smoke, the higher your risk of disease. While it is never too late to quit, the sooner you quit, the better.  Where can I find help to quit  smoking?  The best way to prevent lung cancer is to stop smoking. If you have already quit smoking, congratulations and keep it up! For help on quitting smoking, please call [employer] at 2-717-QUITNOW (1-443.780.3457) or the American [employer] Society at 1-542.742.7374 to find local resources near you.  One-on-one health coaching:  If you d prefer to work individually with a health care provider on tobacco cessation, we offer:      Medication Therapy Management:  Our specially trained pharmacists work closely with you and your doctor to help you quit smoking.  Call [phone] or [phone] (toll free).

## 2024-09-10 NOTE — PROGRESS NOTES
Preventive Care Visit  RANGE MER VilllaobosPHOENIX Antony CNP, Family Medicine  Sep 10, 2024      Assessment & Plan     (Z00.00) Encounter for Medicare annual wellness exam  (primary encounter diagnosis)  Comment: Check labs and UA   Plan: CBC with platelets, Comprehensive metabolic         panel (BMP + Alb, Alk Phos, ALT, AST, Total.         Bili, TP), TSH with free T4 reflex, Lipid         Profile (Chol, Trig, HDL, LDL calc), UA         Macroscopic with reflex to Microscopic and         Culture            (E78.00) Hypercholesterolemia  Comment: Check labs. Fasting today   Plan: Comprehensive metabolic panel (BMP + Alb, Alk         Phos, ALT, AST, Total. Bili, TP), Lipid Profile        (Chol, Trig, HDL, LDL calc)            (Z12.11) Screening for colon cancer  Comment: Due for colon cancer screening   Plan: COLOGUARD([employer])            (N63.0) Breast lump in upper inner quadrant  Comment: Check diagnostic mammogram with ultrasound. History of mass removed from right breast   Plan: MA Diagnostic Bilateral w/Robinson, US Breast Right        Limited 1-3 Quadrants            (M79.672) Left foot pain  Comment: Ongoing left foot pain near her 2-4 toes. Check XRAY. Denies injury or trauma   Plan: XR FOOT LT G/E 3 VW (Clinic Performed)            (Z12.83) Screening for skin cancer  Comment: Ref to derm for comprehensive skin check   Plan: Adult Dermatology  Referral              Depression Screening Follow Up        9/10/2024     9:19 AM   PHQ   PHQ-9 Total Score 12   Q9: Thoughts of better off dead/self-harm past 2 weeks Not at all         9/10/2024     9:19 AM   Last PHQ-9   1.  Little interest or pleasure in doing things 1   2.  Feeling down, depressed, or hopeless 1   3.  Trouble falling or staying asleep, or sleeping too much 3   4.  Feeling tired or having little energy 3   5.  Poor appetite or overeating 1   6.  Feeling bad about yourself 1   7.  Trouble concentrating 1   8.  Moving slowly or  restless 1   Q9: Thoughts of better off dead/self-harm past 2 weeks 0   PHQ-9 Total Score 12   Difficulty at work, home, or with people Somewhat difficult     Denies SI/HI thoughts.     Follow Up Actions Taken  Crisis resource information provided in After Visit Summary  Patient declined referral.   Offered medication for depression and she declined      Counseling  Appropriate preventive services were addressed with this patient via screening, questionnaire, or discussion as appropriate for fall prevention, nutrition, physical activity, Tobacco-use cessation, social engagement, weight loss and cognition.  Checklist reviewing preventive services available has been given to the patient.  Reviewed patient's diet, addressing concerns and/or questions.   The patient was instructed to see the dentist every 6 months.   Discussed possible causes of fatigue. Updated plan of care.  Patient reported difficulty with activities of daily living were addressed today.Patient reported safety concerns were addressed today.The patient's PHQ-9 score is consistent with moderate depression. She was provided with information regarding depression.       See Patient Instructions    Return if symptoms worsen or fail to improve.    Jose Bello is a 54 year old, presenting for the following:  Physical        9/10/2024     9:15 AM   Additional Questions   Roomed by [facility]        Health Care Directive  Patient does not have a Health Care Directive or Living Will: Discussed advance care planning with patient; information given to patient to review.    HPI    Breast Concern  Onset/Duration: x 1.5 weeks  Description:   Location: right above nipple  Pain or tenderness: No  Redness: No  Progression of Symptoms: same  Accompanying Signs & Symptoms:  Any lumps in axillary region: No  Movable: YES  Nipple discharge: no  Changes in the skin or nipple: puckered spots, breast looks different, red spots on them  On Hormone therapy: No  Does it  change with menstrual cycle: No  Previous history of similar problem:   First degree relative with breast cancer: not sure  Therapies tried and outcome: None  Patient's last menstrual period was 08/10/2017 (approximate).          9/10/2024   General Health   How would you rate your overall physical health? (!) FAIR   Feel stress (tense, anxious, or unable to sleep) Rather much      (!) STRESS CONCERN      9/10/2024   Nutrition   Diet: I don't know            9/10/2024   Exercise   Days per week of moderate/strenous exercise 0 days      (!) EXERCISE CONCERN      9/10/2024   Social Factors   Frequency of gathering with friends or relatives Patient declined   Worry food won't last until get money to buy more Yes   Food not last or not have enough money for food? No   Do you have housing? (Housing is defined as stable permanent housing and does not include staying ouside in a car, in a tent, in an abandoned building, in an overnight shelter, or couch-surfing.) Yes   Are you worried about losing your housing? No   Lack of transportation? No   Unable to get utilities (heat,electricity)? Patient declined      (!) FOOD SECURITY CONCERN PRESENT      9/10/2024   Fall Risk   Fallen 2 or more times in the past year? No   Trouble with walking or balance? Yes   Gait Speed Test (Document in seconds) 3   Gait Speed Test Interpretation Less than or equal to 5.00 seconds - PASS             9/10/2024   Activities of Daily Living- Home Safety   Needs help with the following daily activites Preparing meals    Housework   Safety concerns in the home No grab bars in the bathroom       Multiple values from one day are sorted in reverse-chronological order         9/10/2024   Dental   Dentist two times every year? (!) NO            9/10/2024   Hearing Screening   Hearing concerns? None of the above            9/10/2024   Driving Risk Screening   Patient/family members have concerns about driving No            9/10/2024   General  Alertness/Fatigue Screening   Have you been more tired than usual lately? (!) YES            9/10/2024   Urinary Incontinence Screening   Bothered by leaking urine in past 6 months No             Today's PHQ-9 Score:       9/10/2024     9:19 AM   PHQ-9 SCORE   PHQ-9 Total Score 12         9/10/2024   Substance Use   Alcohol more than 3/day or more than 7/wk No   Do you have a current opioid prescription? No   How severe/bad is pain from 1 to 10? 8/10   Do you use any other substances recreationally? (!) CANNABIS PRODUCTS        Social History     Tobacco Use    Smoking status: Former     Current packs/day: 0.00     Average packs/day: 0.5 packs/day for 11.0 years (5.5 ttl pk-yrs)     Types: Cigarettes     Start date: 1988     Quit date: 1999     Years since quittin.6    Smokeless tobacco: Never   Vaping Use    Vaping status: Never Used   Substance Use Topics    Alcohol use: Yes     Comment: occasionally    Drug use: Yes     Types: Marijuana     Comment: 3 times weeks           Mammogram Screening - Mammogram every 1-2 years updated in Health Maintenance based on mutual decision making      History of abnormal Pap smear: Status post hysterectomy with removal of cervix and no history of CIN2 or greater or cervical cancer. Health Maintenance and Surgical History updated.       ASCVD Risk   The 10-year ASCVD risk score (Sean CHACON, et al., 2019) is: 2.1%    Values used to calculate the score:      Age: 54 years      Sex: Female      Is Non- : No      Diabetic: No      Tobacco smoker: No      Systolic Blood Pressure: 136 mmHg      Is BP treated: No      HDL Cholesterol: 66 mg/dL      Total Cholesterol: 285 mg/dL      Reviewed and updated as needed this visit by Provider     Meds              Current providers sharing in care for this patient include:  Patient Care Team:  Jesica Hernández APRN CNP as PCP - General (Family Medicine)  Jesica Hernández APRN CNP as  Assigned PCP    The following health maintenance items are reviewed in Epic and correct as of today:  Health Maintenance   Topic Date Due    COLORECTAL CANCER SCREENING  Never done    HEPATITIS B IMMUNIZATION (1 of 3 - 19+ 3-dose series) Never done    ZOSTER IMMUNIZATION (1 of 2) Never done    INFLUENZA VACCINE (1) 09/01/2024    COVID-19 Vaccine (3 - 2023-24 season) 09/01/2024    PHQ-9  03/10/2025    MAMMO SCREENING  09/06/2025    MEDICARE ANNUAL WELLNESS VISIT  09/10/2025    LIPID  09/10/2025    GLUCOSE  09/10/2027    DTAP/TDAP/TD IMMUNIZATION (7 - Td or Tdap) 07/09/2028    ADVANCE CARE PLANNING  09/10/2029    HEPATITIS C SCREENING  Completed    HIV SCREENING  Completed    DEPRESSION ACTION PLAN  Completed    Pneumococcal Vaccine: Pediatrics (0 to 5 Years) and At-Risk Patients (6 to 64 Years)  Aged Out    HPV IMMUNIZATION  Aged Out    MENINGITIS IMMUNIZATION  Aged Out    RSV MONOCLONAL ANTIBODY  Aged Out    PAP  Discontinued    LUNG CANCER SCREENING  Discontinued         Review of Systems  CONSTITUTIONAL: NEGATIVE for fever, chills, change in weight  INTEGUMENTARY/SKIN: NEGATIVE for worrisome rashes, moles or lesions  EYES: NEGATIVE for vision changes or irritation  ENT/MOUTH: NEGATIVE for ear, mouth and throat problems  RESP: NEGATIVE for significant cough or SOB  BREAST: NEGATIVE for tenderness or discharge. +right breast mass   CV: NEGATIVE for chest pain, palpitations or peripheral edema  GI: NEGATIVE for nausea, abdominal pain, heartburn, or change in bowel habits  : NEGATIVE for frequency, dysuria, or hematuria  MUSCULOSKELETAL: NEGATIVE for significant arthralgias or myalgia  NEURO: NEGATIVE for weakness, dizziness or paresthesias  ENDOCRINE: NEGATIVE for temperature intolerance, skin/hair changes  HEME: NEGATIVE for bleeding problems  PSYCHIATRIC: NEGATIVE for changes in mood or affect     Objective    Exam  /82 (BP Location: Right arm, Patient Position: Sitting, Cuff Size: Adult Regular)   Pulse  "77   Temp 97.7  F (36.5  C) (Tympanic)   Resp 18   Ht 1.549 m (5' 1\")   Wt 47.5 kg (104 lb 11.2 oz)   LMP 08/10/2017 (Approximate)   SpO2 98%   BMI 19.78 kg/m     Estimated body mass index is 19.78 kg/m  as calculated from the following:    Height as of this encounter: 1.549 m (5' 1\").    Weight as of this encounter: 47.5 kg (104 lb 11.2 oz).    Physical Exam  GENERAL: alert and no distress  EYES: Eyes grossly normal to inspection, PERRL and conjunctivae and sclerae normal  HENT: ear canals and TM's normal, nose and mouth without ulcers or lesions  NECK: no adenopathy, no asymmetry, masses, or scars  RESP: lungs clear to auscultation - no rales, rhonchi or wheezes  BREAST: normal without masses, tenderness or nipple discharge and no palpable axillary masses or adenopathy to left breast. Right breast with firm lump at 11 O'clock to right breast. Right breast with erythema scab region at 3 O'clock region. Bilateral breast tissue is dense  CV: regular rate and rhythm, normal S1 S2, no S3 or S4, no murmur, click or rub, no peripheral edema  ABDOMEN: soft, nontender, no hepatosplenomegaly, no masses and bowel sounds normal   (female): Declined exam   MS: no gross musculoskeletal defects noted, no edema  SKIN: no suspicious lesions or rashes  NEURO: Normal strength and tone, mentation intact and speech normal  PSYCH: mentation appears normal, affect normal/bright      Vision Screen  Reason Vision Screen Not Completed: Parent/Patient declined - No concerns      Signed Electronically by: PHOENIX Valdez CNP      "

## 2024-09-10 NOTE — LETTER
My Depression Action Plan  Name: Eugenia Earl   Date of Birth 1970  Date: 9/10/2024    My doctor: Jesica Hernández   My clinic: [address]  [facility] [address]  [phone]            GREEN    ZONE   Good Control    What it looks like:   Things are going generally well. You have normal ups and downs. You may even feel depressed from time to time, but bad moods usually last less than a day.   What you need to do:  Continue to care for yourself (see self care plan)  Check your depression survival kit and update it as needed  Follow your physician s recommendations including any medication.  Do not stop taking medication unless you consult with your physician first.             YELLOW         ZONE Getting Worse    What it looks like:   Depression is starting to interfere with your life.   It may be hard to get out of bed; you may be starting to isolate yourself from others.  Symptoms of depression are starting to last most all day and this has happened for several days.   You may have suicidal thoughts but they are not constant.   What you need to do:     Call your care team. Your response to treatment will improve if you keep your care team informed of your progress. Yellow periods are signs an adjustment may need to be made.     Continue your self-care.  Just get dressed and ready for the day.  Don't give yourself time to talk yourself out of it.    Talk to someone in your support network.    Open up your Depression Self-Care Plan/Wellness Kit.             RED    ZONE Medical Alert - Get Help    What it looks like:   Depression is seriously interfering with your life.   You may experience these or other symptoms: You can t get out of bed most days, can t work or engage in other necessary activities, you have trouble taking care of basic hygiene, or basic responsibilities, thoughts of suicide or death that will not go away, self-injurious behavior.     What  you need to do:  Call your care team and request a same-day appointment. If they are not available (weekends or after hours) call your local crisis line, emergency room or 911.          Depression Self-Care Plan / Wellness Kit    Many people find that medication and therapy are helpful treatments for managing depression. In addition, making small changes to your everyday life can help to boost your mood and improve your wellbeing. Below are some tips for you to consider. Be sure to talk with your medical provider and/or behavioral health consultant if your symptoms are worsening or not improving.     Sleep   Sleep hygiene  means all of the habits that support good, restful sleep. It includes maintaining a consistent bedtime and wake time, using your bedroom only for sleeping or sex, and keeping the bedroom dark and free of distractions like a computer, smartphone, or television.     Develop a Healthy Routine  Maintain good hygiene. Get out of bed in the morning, make your bed, brush your teeth, take a shower, and get dressed. Don t spend too much time viewing media that makes you feel stressed. Find time to relax each day.    Exercise  Get some form of exercise every day. This will help reduce pain and release endorphins, the  feel good  chemicals in your brain. It can be as simple as just going for a walk or doing some gardening, anything that will get you moving.      Diet  Strive to eat healthy foods, including fruits and vegetables. Drink plenty of water. Avoid excessive sugar, caffeine, alcohol, and other mood-altering substances.     Stay Connected with Others  Stay in touch with friends and family members.    Manage Your Mood  Try deep breathing, massage therapy, biofeedback, or meditation. Take part in fun activities when you can. Try to find something to smile about each day.     Psychotherapy  Be open to working with a therapist if your provider recommends it.     Medication  Be sure to take your  medication as prescribed. Most anti-depressants need to be taken every day. It usually takes several weeks for medications to work. Not all medicines work for all people. It is important to follow-up with your provider to make sure you have a treatment plan that is working for you. Do not stop your medication abruptly without first discussing it with your provider.    Crisis Resources   These hotlines are for both adults and children. They and are open 24 hours a day, 7 days a week unless noted otherwise.    National Suicide Prevention Lifeline   988 or 6-783-057-WQTH (3310)    Crisis Text Line    www.crisistextline.org  Text HOME to 738935 from anywhere in the [place], anytime, about any type of crisis. A live, trained crisis counselor will receive the text and respond quickly.    [employer] Lifeline for LGBTQ Youth  A national crisis intervention and suicide lifeline for LGBTQ youth under 25. Provides a safe place to talk without judgement. Call 1-782.477.4821; text START to 203923 or visit www.the[employer] Solarvorproject.org to talk to a trained counselor.    For [facility] crisis numbers, visit the [facility] website at:  https://[place].gov/dhs/people-we-serve/adults/health-care/mental-health/resources/crisis-contacts.jsp

## 2024-09-11 ENCOUNTER — HOSPITAL ENCOUNTER (OUTPATIENT)
Dept: ULTRASOUND IMAGING | Facility: HOSPITAL | Age: 54
Discharge: HOME | End: 2024-09-11
Attending: NURSE PRACTITIONER
Payer: MEDICARE

## 2024-09-11 ENCOUNTER — HOSPITAL ENCOUNTER (OUTPATIENT)
Dept: MAMMOGRAPHY | Facility: OTHER | Age: 54
Discharge: HOME | End: 2024-09-11
Attending: NURSE PRACTITIONER
Payer: MEDICARE

## 2024-09-11 DIAGNOSIS — N64.9 BREAST LESION: Primary | ICD-10-CM

## 2024-09-11 DIAGNOSIS — N63.0 BREAST LUMP IN UPPER INNER QUADRANT: ICD-10-CM

## 2024-09-11 LAB
EST. AVERAGE GLUCOSE BLD GHB EST-MCNC: 114 MG/DL
HBA1C MFR BLD: 5.6 %

## 2024-09-11 PROCEDURE — 77062 BREAST TOMOSYNTHESIS BI: CPT | Mod: TC

## 2024-09-11 PROCEDURE — 76642 ULTRASOUND BREAST LIMITED: CPT | Mod: RT

## 2024-09-18 ENCOUNTER — OFFICE VISIT (OUTPATIENT)
Dept: SURGERY | Facility: OTHER | Age: 54
End: 2024-09-18
Attending: NURSE PRACTITIONER
Payer: MEDICARE

## 2024-09-18 VITALS
WEIGHT: 104 LBS | SYSTOLIC BLOOD PRESSURE: 120 MMHG | OXYGEN SATURATION: 98 % | HEIGHT: 61 IN | HEART RATE: 92 BPM | RESPIRATION RATE: 16 BRPM | DIASTOLIC BLOOD PRESSURE: 68 MMHG | BODY MASS INDEX: 19.63 KG/M2

## 2024-09-18 DIAGNOSIS — D24.1 BENIGN NEOPLASM OF RIGHT BREAST: ICD-10-CM

## 2024-09-18 DIAGNOSIS — N64.9 BREAST LESION: Primary | ICD-10-CM

## 2024-09-18 PROCEDURE — G0463 HOSPITAL OUTPT CLINIC VISIT: HCPCS | Mod: 25

## 2024-09-18 PROCEDURE — G0463 HOSPITAL OUTPT CLINIC VISIT: HCPCS

## 2024-09-18 PROCEDURE — 99203 OFFICE O/P NEW LOW 30 MIN: CPT | Performed by: SURGERY

## 2024-09-18 ASSESSMENT — PAIN SCALES - GENERAL: PAINLEVEL: NO PAIN (0)

## 2024-09-18 NOTE — PATIENT INSTRUCTIONS
Thank you for allowing Dr. Garcias and our surgical team to participate in your care. Please call our health unit coordinator at [phone] with scheduling questions or the nurse at [phone] with any other questions or concerns.    6 month follow breast ultrasound scan.    Please call with any questions or concerns.
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
## 2025-02-26 ENCOUNTER — TELEPHONE (OUTPATIENT)
Dept: SURGERY | Facility: OTHER | Age: 55
End: 2025-02-26

## 2025-04-17 NOTE — LETTER
[facility]  [address]  [place] [address]  Phone: [phone]    February 13, 2019        Eugenia Earl  [address]  [place] [address]          To whom it may concern:    RE: Eugenia Earl    Please excuse from work for medical illness 2/13, and 2/14/19/ Seen in Urgent care.      Please contact me for questions or concerns.      Sincerely,        Anish Scruggs CNP    no

## 2025-04-24 ENCOUNTER — OFFICE VISIT (OUTPATIENT)
Dept: FAMILY MEDICINE | Facility: OTHER | Age: 55
End: 2025-04-24
Attending: NURSE PRACTITIONER
Payer: MEDICARE

## 2025-04-24 VITALS
OXYGEN SATURATION: 98 % | HEIGHT: 61 IN | BODY MASS INDEX: 19.45 KG/M2 | SYSTOLIC BLOOD PRESSURE: 122 MMHG | HEART RATE: 103 BPM | DIASTOLIC BLOOD PRESSURE: 80 MMHG | TEMPERATURE: 97.6 F | WEIGHT: 103 LBS

## 2025-04-24 DIAGNOSIS — F43.20 GRIEF REACTION: ICD-10-CM

## 2025-04-24 DIAGNOSIS — G89.29 CHRONIC BILATERAL BACK PAIN, UNSPECIFIED BACK LOCATION: ICD-10-CM

## 2025-04-24 DIAGNOSIS — M54.9 CHRONIC BILATERAL BACK PAIN, UNSPECIFIED BACK LOCATION: ICD-10-CM

## 2025-04-24 DIAGNOSIS — F41.1 GAD (GENERALIZED ANXIETY DISORDER): ICD-10-CM

## 2025-04-24 DIAGNOSIS — Z83.79 FAMILY HISTORY OF LIVER DISEASE: ICD-10-CM

## 2025-04-24 DIAGNOSIS — G47.00 INSOMNIA, UNSPECIFIED TYPE: ICD-10-CM

## 2025-04-24 DIAGNOSIS — R53.83 OTHER FATIGUE: Primary | ICD-10-CM

## 2025-04-24 LAB
ALBUMIN SERPL BCG-MCNC: 4.6 G/DL (ref 3.5–5.2)
ALP SERPL-CCNC: 88 U/L (ref 40–150)
ALT SERPL W P-5'-P-CCNC: 13 U/L (ref 0–50)
ANION GAP SERPL CALCULATED.3IONS-SCNC: 13 MMOL/L (ref 7–15)
AST SERPL W P-5'-P-CCNC: 21 U/L (ref 0–45)
BILIRUB SERPL-MCNC: 0.5 MG/DL
BUN SERPL-MCNC: 14.7 MG/DL (ref 6–20)
CALCIUM SERPL-MCNC: 9.9 MG/DL (ref 8.8–10.4)
CHLORIDE SERPL-SCNC: 102 MMOL/L (ref 98–107)
CREAT SERPL-MCNC: 0.78 MG/DL (ref 0.51–0.95)
EGFRCR SERPLBLD CKD-EPI 2021: 90 ML/MIN/1.73M2
GLUCOSE SERPL-MCNC: 91 MG/DL (ref 70–99)
HCO3 SERPL-SCNC: 26 MMOL/L (ref 22–29)
POTASSIUM SERPL-SCNC: 4.3 MMOL/L (ref 3.4–5.3)
PROT SERPL-MCNC: 7.7 G/DL (ref 6.4–8.3)
SODIUM SERPL-SCNC: 141 MMOL/L (ref 135–145)
TSH SERPL DL<=0.005 MIU/L-ACNC: 0.77 UIU/ML (ref 0.3–4.2)

## 2025-04-24 PROCEDURE — G0463 HOSPITAL OUTPT CLINIC VISIT: HCPCS

## 2025-04-24 PROCEDURE — 3074F SYST BP LT 130 MM HG: CPT | Performed by: NURSE PRACTITIONER

## 2025-04-24 PROCEDURE — G2211 COMPLEX E/M VISIT ADD ON: HCPCS | Performed by: NURSE PRACTITIONER

## 2025-04-24 PROCEDURE — 99214 OFFICE O/P EST MOD 30 MIN: CPT | Performed by: NURSE PRACTITIONER

## 2025-04-24 PROCEDURE — 36415 COLL VENOUS BLD VENIPUNCTURE: CPT | Mod: ZL | Performed by: NURSE PRACTITIONER

## 2025-04-24 PROCEDURE — 82310 ASSAY OF CALCIUM: CPT | Mod: ZL | Performed by: NURSE PRACTITIONER

## 2025-04-24 PROCEDURE — 84443 ASSAY THYROID STIM HORMONE: CPT | Mod: ZL | Performed by: NURSE PRACTITIONER

## 2025-04-24 PROCEDURE — 1125F AMNT PAIN NOTED PAIN PRSNT: CPT | Performed by: NURSE PRACTITIONER

## 2025-04-24 PROCEDURE — 3079F DIAST BP 80-89 MM HG: CPT | Performed by: NURSE PRACTITIONER

## 2025-04-24 RX ORDER — ESCITALOPRAM OXALATE 20 MG/1
20 TABLET ORAL DAILY
Qty: 30 TABLET | Refills: 0 | Status: SHIPPED | OUTPATIENT
Start: 2025-04-24

## 2025-04-24 RX ORDER — LORAZEPAM 0.5 MG/1
0.5 TABLET ORAL 2 TIMES DAILY PRN
Qty: 20 TABLET | Refills: 0 | Status: SHIPPED | OUTPATIENT
Start: 2025-04-24

## 2025-04-24 RX ORDER — TRAZODONE HYDROCHLORIDE 50 MG/1
50 TABLET ORAL AT BEDTIME
Qty: 30 TABLET | Refills: 0 | Status: SHIPPED | OUTPATIENT
Start: 2025-04-24

## 2025-04-24 ASSESSMENT — ANXIETY QUESTIONNAIRES
7. FEELING AFRAID AS IF SOMETHING AWFUL MIGHT HAPPEN: MORE THAN HALF THE DAYS
6. BECOMING EASILY ANNOYED OR IRRITABLE: MORE THAN HALF THE DAYS
4. TROUBLE RELAXING: MORE THAN HALF THE DAYS
GAD7 TOTAL SCORE: 17
IF YOU CHECKED OFF ANY PROBLEMS ON THIS QUESTIONNAIRE, HOW DIFFICULT HAVE THESE PROBLEMS MADE IT FOR YOU TO DO YOUR WORK, TAKE CARE OF THINGS AT HOME, OR GET ALONG WITH OTHER PEOPLE: VERY DIFFICULT
GAD7 TOTAL SCORE: 17
1. FEELING NERVOUS, ANXIOUS, OR ON EDGE: NEARLY EVERY DAY
7. FEELING AFRAID AS IF SOMETHING AWFUL MIGHT HAPPEN: MORE THAN HALF THE DAYS
GAD7 TOTAL SCORE: 17
8. IF YOU CHECKED OFF ANY PROBLEMS, HOW DIFFICULT HAVE THESE MADE IT FOR YOU TO DO YOUR WORK, TAKE CARE OF THINGS AT HOME, OR GET ALONG WITH OTHER PEOPLE?: VERY DIFFICULT
2. NOT BEING ABLE TO STOP OR CONTROL WORRYING: NEARLY EVERY DAY
3. WORRYING TOO MUCH ABOUT DIFFERENT THINGS: NEARLY EVERY DAY
5. BEING SO RESTLESS THAT IT IS HARD TO SIT STILL: MORE THAN HALF THE DAYS

## 2025-04-24 ASSESSMENT — PATIENT HEALTH QUESTIONNAIRE - PHQ9
SUM OF ALL RESPONSES TO PHQ QUESTIONS 1-9: 21
10. IF YOU CHECKED OFF ANY PROBLEMS, HOW DIFFICULT HAVE THESE PROBLEMS MADE IT FOR YOU TO DO YOUR WORK, TAKE CARE OF THINGS AT HOME, OR GET ALONG WITH OTHER PEOPLE: EXTREMELY DIFFICULT
SUM OF ALL RESPONSES TO PHQ QUESTIONS 1-9: 21

## 2025-04-24 ASSESSMENT — ENCOUNTER SYMPTOMS: NERVOUS/ANXIOUS: 1

## 2025-04-24 ASSESSMENT — PAIN SCALES - GENERAL: PAINLEVEL_OUTOF10: SEVERE PAIN (8)

## 2025-04-24 NOTE — PROGRESS NOTES
"  Assessment & Plan     There are no diagnoses linked to this encounter.          Depression Screening Follow Up        4/24/2025    11:07 AM   PHQ   PHQ-9 Total Score 21    Q9: Thoughts of better off dead/self-harm past 2 weeks Not at all       Patient-reported         4/24/2025    11:07 AM   Last PHQ-9   1.  Little interest or pleasure in doing things 3   2.  Feeling down, depressed, or hopeless 2   3.  Trouble falling or staying asleep, or sleeping too much 3   4.  Feeling tired or having little energy 3   5.  Poor appetite or overeating 3   6.  Feeling bad about yourself 1   7.  Trouble concentrating 3   8.  Moving slowly or restless 3   Q9: Thoughts of better off dead/self-harm past 2 weeks 0   PHQ-9 Total Score 21        Patient-reported         Follow Up Actions Taken  {Positive screening follow up actions:670679::\"Crisis resource information provided in After Visit Summary\"}       {Follow-up (Optional):041516}    Jose Bello is a 55 year old, presenting for the following health issues:  Anxiety, Depression, and Pain        4/24/2025    11:28 AM   Additional Questions   Roomed by Natali TYLER     History of Present Illness       Back Pain:  She presents for follow up of back pain. Patient's back pain is a chronic problem.  Location of back pain:  Right lower back, left lower back, right middle of back, left middle of back, right upper back, left upper back, right side of neck, left side of neck, right shoulder, left shoulder, right buttock, left buttock, right hip, left hip, right side of waist and left side of waist  Description of back pain: burning, dull ache, gnawing, sharp, shooting and stabbing  Back pain spreads: right buttocks, left buttocks, right thigh, left thigh, right knee, left knee, right foot, left foot, right shoulder, left shoulder, right side of neck and left side of neck    Since patient first noticed back pain, pain is: always present, but gets better and worse  Does back pain interfere " with her job:  Not applicable       Mental Health Follow-up:  Patient presents to follow-up on Depression & Anxiety.Patient's depression since last visit has been:  Worse  The patient is having other symptoms associated with depression.  Patient's anxiety since last visit has been:  Medium  The patient is not having other symptoms associated with anxiety.  Any significant life events: grief or loss  Patient is feeling anxious or having panic attacks.  Patient has no concerns about alcohol or drug use.    Reason for visit:  Follow up    She eats 0-1 servings of fruits and vegetables daily.She consumes 4 sweetened beverage(s) daily.She exercises with enough effort to increase her heart rate 9 or less minutes per day.  She exercises with enough effort to increase her heart rate 3 or less days per week.   She is taking medications regularly.        Depression and Anxiety   How are you doing with your depression since your last visit? Worsened   How are you doing with your anxiety since your last visit?  Worsened   Are you having other symptoms that might be associated with depression or anxiety? Yes:  very close panic attacks, pain in chest due to loss  Have you had a significant life event? Grief or Loss   Do you have any concerns with your use of alcohol or other drugs? No    Social History     Tobacco Use    Smoking status: Former     Current packs/day: 0.00     Average packs/day: 0.5 packs/day for 11.0 years (5.5 ttl pk-yrs)     Types: Cigarettes     Start date: 1988     Quit date: 1999     Years since quittin.2    Smokeless tobacco: Never   Vaping Use    Vaping status: Never Used   Substance Use Topics    Alcohol use: Yes     Comment: occasionally    Drug use: Yes     Types: Marijuana     Comment: 3 times weeks          2024     1:33 PM 9/10/2024     9:19 AM 2025    11:07 AM   PHQ   PHQ-9 Total Score 15 12 21    Q9: Thoughts of better off dead/self-harm past 2 weeks Not at all Not at all  "Not at all       Patient-reported         9/26/2017     1:00 PM 4/24/2025    11:08 AM   WALLACE-7 SCORE   Total Score  17 (severe anxiety)   Total Score 0 17        Patient-reported     Suicide Assessment Five-step Evaluation and Treatment (SAFE-T)  {Provider  Link to Depression Care Package SmartSet :400069}      Chronic/Recurring Back Pain Follow Up  Where is your back pain located? (Select all that apply) low back bilateral, middle of back bilateral, upper back bilateral, neck bilateral, shoulders bilateral, hip bilateral, and waist bilateral  How would you describe your back pain?  burning, dull ache, gnawing, sharp, shooting, and stabbing  Where does your back pain spread? the right and left buttock, the right and left  thigh, the right and left  knee, the right and left foot, the right and left shoulder, and the right and left neck  Since your last clinic visit for back pain, how has your pain changed? gradually worsening  Does your back pain interfere with your job? Not applicable  Since your last visit, have you tried any new treatment? No    {ROS Picklists (Optional):634520}      Objective    /80 (BP Location: Right arm, Patient Position: Sitting, Cuff Size: Adult Regular)   Pulse 103   Temp 97.6  F (36.4  C) (Tympanic)   Ht 1.549 m (5' 1\")   Wt 46.7 kg (103 lb)   LMP 08/10/2017 (Approximate)   SpO2 98%   BMI 19.46 kg/m    Body mass index is 19.46 kg/m .  Physical Exam   {Exam List (Optional):624201}    {Diagnostic Test Results (Optional):297485}        Signed Electronically by: PHOENIX Valdez CNP  {Email feedback regarding this note to [email or web address]   :476018}  " "4/24/2025    11:08 AM   WALLACE-7 SCORE   Total Score  17 (severe anxiety)   Total Score 0 17        Patient-reported     Denies SI/HI thoughts.       Suicide Assessment Five-step Evaluation and Treatment (SAFE-T)        Chronic/Recurring Back Pain Follow Up  Where is your back pain located? (Select all that apply) low back bilateral, middle of back bilateral, upper back bilateral, neck bilateral, shoulders bilateral, hip bilateral, and waist bilateral  How would you describe your back pain?  burning, dull ache, gnawing, sharp, shooting, and stabbing  Where does your back pain spread? the right and left buttock, the right and left  thigh, the right and left  knee, the right and left foot, the right and left shoulder, and the right and left neck  Since your last clinic visit for back pain, how has your pain changed? gradually worsening  Does your back pain interfere with your job? Not applicable  Since your last visit, have you tried any new treatment? No      Review of Systems  Constitutional, HEENT, cardiovascular, pulmonary, GI, , musculoskeletal, neuro, skin, endocrine and psych systems are negative, except as otherwise noted.      Objective    /80 (BP Location: Right arm, Patient Position: Sitting, Cuff Size: Adult Regular)   Pulse 103   Temp 97.6  F (36.4  C) (Tympanic)   Ht 1.549 m (5' 1\")   Wt 46.7 kg (103 lb)   LMP 08/10/2017 (Approximate)   SpO2 98%   BMI 19.46 kg/m    Body mass index is 19.46 kg/m .  Physical Exam   GENERAL: alert and no distress  NECK: no adenopathy, no asymmetry, masses, or scars  RESP: lungs clear to auscultation - no rales, rhonchi or wheezes  CV: regular rate and rhythm, normal S1 S2, no S3 or S4, no murmur, click or rub, no peripheral edema  ABDOMEN: soft, nontender, no hepatosplenomegaly, no masses and bowel sounds normal  MS: no gross musculoskeletal defects noted, no edema. Generalized back pain. Paraspinal muscle tenderness throughout back. No rash, erythema, bruising, " or warmth to the touch. Distal pulses intact   SKIN: no suspicious lesions or rashes  NEURO: Normal strength and tone, mentation intact and speech normal  PSYCH: mentation appears normal, affect normal/bright        Signed Electronically by: PHOENIX Valdez CNP

## (undated) DEVICE — TUBING-INSUFFLATION/LAPAROFLATOR W/FILTER

## (undated) DEVICE — SCD SLEEVE-THIGH REG.

## (undated) DEVICE — TOWEL-OR DISP 4PKS

## (undated) DEVICE — PRESSURE INFUSOR DISP. 3000CC

## (undated) DEVICE — IRRIGATION-NACL 1000ML

## (undated) DEVICE — SUTURE-VICRYL 0 CT-1 J346H

## (undated) DEVICE — TRAY-SKIN PREP POVIDONE/IODINE

## (undated) DEVICE — BIN-UROLOGY / CYSTO

## (undated) DEVICE — SET-TUR Y-TYPE BLADDER IRRIGATION

## (undated) DEVICE — NDL-INSUFFLATION 120MM

## (undated) DEVICE — TROCAR-5X100MM BLADED W/FIXATION

## (undated) DEVICE — LABEL-STERILE PREPRINTED FOR OR

## (undated) DEVICE — SUTURE-VICRYL 0 CT-2 POP-OFF J727D

## (undated) DEVICE — PACK-LAP LAVH-CUSTOM

## (undated) DEVICE — CATH-URETHRAL 14FR

## (undated) DEVICE — GLV-8.5 BIOGEL LATEX

## (undated) DEVICE — GLV-7.0 PROTEXIS PI CLASSIC LF/PF

## (undated) DEVICE — UTERINE MANIPULATOR-KRONNER MANIPUJECTOR

## (undated) DEVICE — IRRIGATION-NACL 3000ML (BAG)

## (undated) DEVICE — CATH TRAY-16FR METER W/STATLOCK LATEX]

## (undated) DEVICE — BLADE-SURG CLIPPER

## (undated) DEVICE — SUTURE-VICRYL 3-0 CT-2 J232H

## (undated) DEVICE — APPLICATOR-CHLORAPREP 26ML TINTED CHG 2%+ 70% IPA-SURGICAL

## (undated) DEVICE — IRRIGATION-H2O 1000ML

## (undated) DEVICE — CANISTER-SUCTION 2000CC

## (undated) DEVICE — CAUTERY PAD-POLYHESIVE II ADULT

## (undated) DEVICE — LIGHT HANDLE COVER

## (undated) DEVICE — SOL-NACL 0.9% 1000ML

## (undated) DEVICE — DRAPE-THREE QUARTER (LARGE) SHEET

## (undated) DEVICE — SPATULA TIP FOR SUCTION IRRIGATION PROBE

## (undated) DEVICE — BLANKET-BAIR UPPER BODY

## (undated) DEVICE — LIGASURE-5MM BLUNT TIP LAPAROSCOPIC

## (undated) DEVICE — SPONGE-LAPAROTOMY PADS 18 X 18

## (undated) DEVICE — TOPICAL SKIN ADHESIVE EXOFIN

## (undated) DEVICE — SUTURE-VICRYL 2-0 CT-1 J945H

## (undated) DEVICE — DRAPE-STERI 45X60CM #1010

## (undated) DEVICE — COVER-TABLE SHEET

## (undated) DEVICE — SUCTION-IRRIGATION STRYKEFLOW II (STRYKER)

## (undated) RX ORDER — FENTANYL CITRATE 50 UG/ML
INJECTION, SOLUTION INTRAMUSCULAR; INTRAVENOUS
Status: DISPENSED
Start: 2017-09-27

## (undated) RX ORDER — PROPOFOL 10 MG/ML
INJECTION, EMULSION INTRAVENOUS
Status: DISPENSED
Start: 2017-09-27

## (undated) RX ORDER — ONDANSETRON 2 MG/ML
INJECTION INTRAMUSCULAR; INTRAVENOUS
Status: DISPENSED
Start: 2017-09-27

## (undated) RX ORDER — DEXAMETHASONE SODIUM PHOSPHATE 10 MG/ML
INJECTION, SOLUTION INTRAMUSCULAR; INTRAVENOUS
Status: DISPENSED
Start: 2017-09-27

## (undated) RX ORDER — LIDOCAINE HYDROCHLORIDE 20 MG/ML
INJECTION, SOLUTION EPIDURAL; INFILTRATION; INTRACAUDAL; PERINEURAL
Status: DISPENSED
Start: 2017-09-27